# Patient Record
Sex: FEMALE | Race: WHITE | Employment: OTHER | URBAN - METROPOLITAN AREA
[De-identification: names, ages, dates, MRNs, and addresses within clinical notes are randomized per-mention and may not be internally consistent; named-entity substitution may affect disease eponyms.]

---

## 2017-01-29 RX ORDER — CHLORTHALIDONE 50 MG/1
TABLET ORAL
Qty: 90 TAB | Refills: 0 | Status: SHIPPED | OUTPATIENT
Start: 2017-01-29 | End: 2017-05-02 | Stop reason: SDUPTHER

## 2017-01-29 RX ORDER — LEVOTHYROXINE SODIUM 112 UG/1
TABLET ORAL
Qty: 90 TAB | Refills: 0 | Status: SHIPPED | OUTPATIENT
Start: 2017-01-29 | End: 2017-05-02 | Stop reason: SDUPTHER

## 2017-02-02 RX ORDER — FENOFIBRATE 145 MG/1
145 TABLET, COATED ORAL DAILY
Qty: 90 TAB | Refills: 1 | Status: SHIPPED | OUTPATIENT
Start: 2017-02-02 | End: 2017-07-13 | Stop reason: SDUPTHER

## 2017-02-07 ENCOUNTER — TELEPHONE (OUTPATIENT)
Dept: INTERNAL MEDICINE CLINIC | Age: 67
End: 2017-02-07

## 2017-02-07 ENCOUNTER — OFFICE VISIT (OUTPATIENT)
Dept: NEUROLOGY | Age: 67
End: 2017-02-07

## 2017-02-07 VITALS
HEIGHT: 61 IN | SYSTOLIC BLOOD PRESSURE: 132 MMHG | HEART RATE: 88 BPM | RESPIRATION RATE: 18 BRPM | DIASTOLIC BLOOD PRESSURE: 76 MMHG | WEIGHT: 186.2 LBS | OXYGEN SATURATION: 96 % | BODY MASS INDEX: 35.16 KG/M2

## 2017-02-07 DIAGNOSIS — G25.81 RLS (RESTLESS LEGS SYNDROME): Primary | ICD-10-CM

## 2017-02-07 RX ORDER — ROPINIROLE 0.25 MG/1
TABLET, FILM COATED ORAL
Qty: 30 TAB | Refills: 4 | Status: SHIPPED | OUTPATIENT
Start: 2017-02-07 | End: 2017-06-02

## 2017-02-07 NOTE — PROGRESS NOTES
Fuad Brasher is a 78 y.o. female who presents with the following  Chief Complaint   Patient presents with    Numbness     follow up       HPI Patient comes in for a follow up for RLS and neuropathy. She has been taking the Horizant 600 mg daily and feels like this has been working well. She is taking this at about dinner time and this starts kicking in by bedtime and she feels normally well. Sometimes she has trouble with just her feet at night. Feet tingle and burn while she is in bed. But the legs feel good. Throughout the day her symptoms are great. No issues. No trouble with her balance or falling or any side effects to the medications other then she feels like the horizant may be causing some weight gain. She is also on some other medications that may cause this but we are unsure. Allergies   Allergen Reactions    Sudafed [Pseudoephedrine Hcl] Palpitations    Codeine Unknown (comments)    Darvocet A500 [Propoxyphene N-Acetaminophen] Nausea and Vomiting       Current Outpatient Prescriptions   Medication Sig    rOPINIRole (REQUIP) 0.25 mg tablet Take 1 tablet by mouth nightly    fenofibrate nanocrystallized (TRICOR) 145 mg tablet Take 1 Tab by mouth daily.  chlorthalidone (HYGROTEN) 50 mg tablet TAKE ONE TABLET BY MOUTH ONCE DAILY **REPLACES  THE  25  MG  DOSE**    levothyroxine (SYNTHROID) 112 mcg tablet TAKE ONE TABLET BY MOUTH ONCE DAILY BEFORE BREAKFAST    gabapentin enacarbil (HORIZANT) 600 mg TbER Take 1 Tab by mouth daily.  triamcinolone acetonide (KENALOG) 0.1 % topical cream Apply  to affected area two (2) times a day. use thin layer    PREMARIN 0.625 mg tablet TAKE ONE TABLET BY MOUTH ONCE DAILY    triamcinolone (NASACORT AQ) 55 mcg nasal inhaler 2 Sprays as needed.  cholecalciferol (VITAMIN D3) 1,000 unit tablet Take 1,000 Units by mouth daily.  b complex vitamins tablet Take 1 Tab by mouth daily.  vitamin e (E GEMS) 1,000 unit capsule Take 1,000 Units by mouth daily.  Glucosamine &Chondroit-MV-Min3 116-549-88-0.5 mg Tab Take  by mouth.  MULTIVITAMINS (DAILY VITAMINS PO) Take 1 Tab by mouth daily. No current facility-administered medications for this visit. History   Smoking Status    Never Smoker   Smokeless Tobacco    Never Used       Past Medical History   Diagnosis Date    Cancer ([facility] [address]      skin    Hypercholesterolemia     Hypertension     Thyroid disease        Past Surgical History   Procedure Laterality Date    Hx gyn        x 2, hysterectomy    Hx malignant skin lesion excision  2012     skin cancer from nose    Pr breast surgery procedure unlisted  ,3/2007,      breast biopsy    Hx colonoscopy  16     Dr. Elizabeth Dinh       Family History   Problem Relation Age of Onset    Heart Disease Father      CAD, CABG    Diabetes Father      insulin    Hypertension Mother     Cancer Mother     Diabetes Brother     Hypertension Brother        Social History     Social History    Marital status:      Spouse name: N/A    Number of children: N/A    Years of education: N/A     Social History Main Topics    Smoking status: Never Smoker    Smokeless tobacco: Never Used    Alcohol use No    Drug use: No    Sexual activity: Yes     Partners: Male     Other Topics Concern    None     Social History Narrative       Review of Systems   HENT: Negative for hearing loss and tinnitus. Eyes: Negative for blurred vision, double vision and photophobia. Respiratory: Negative for shortness of breath and wheezing. Cardiovascular: Negative for chest pain and palpitations. Gastrointestinal: Negative for nausea and vomiting. Musculoskeletal: Negative for falls. Neurological: Positive for tingling and sensory change. Negative for dizziness, tremors, weakness and headaches. Remainder of comprehensive review is negative.      Physical Exam :    Visit Vitals    /76    Pulse 88    Resp 18    Ht 5' 1\" (1.549 m)    Wt 84.5 kg (186 lb 3.2 oz)    SpO2 96%    BMI 35.18 kg/m2       General: Well defined, nourished, and groomed individual in no acute distress.    Neck: Supple, nontender, no bruits, no pain with resistance to active range of motion.    Heart: Regular rate and rhythm, no murmurs, rub, or gallop. Normal S1S2. Lungs: Clear to auscultation bilaterally with equal chest expansion, no cough, no wheeze  Musculoskeletal: Extremities revealed no edema and had full range of motion of joints.    Psych: Good mood and bright affect    NEUROLOGICAL EXAMINATION:    Mental Status: Alert and oriented to person, place, and time    Cranial Nerves:    II, III, IV, VI: Visual acuity grossly intact. Visual fields are normal.    Pupils are equal, round, and reactive to light and accommodation.    Extra-ocular movements are full and fluid. Fundoscopic exam was benign, no ptosis or nystagmus.    V-XII: Hearing is grossly intact. Facial features are symmetric, with normal sensation and strength. The palate rises symmetrically and the tongue protrudes midline. Sternocleidomastoids 5/5. Motor Examination: Normal tone, bulk, and strength, 5/5 muscle strength throughout. Coordination: Finger to nose was normal. No resting or intention tremor    Gait and Station: Steady while walking. Normal arm swing. No pronator drift. No muscle wasting or fasiculations noted. Reflexes: DTRs 2+ throughout. Results for orders placed or performed in visit on 09/19/16   VITAMIN B12   Result Value Ref Range    Vitamin B12 681 211 - 946 pg/mL       Orders Placed This Encounter    rOPINIRole (REQUIP) 0.25 mg tablet     Sig: Take 1 tablet by mouth nightly     Dispense:  30 Tab     Refill:  4       1. RLS (restless legs syndrome)        Follow-up Disposition:  Return in about 6 months (around 8/7/2017). RLS and small fiber neuropathy. We will continue Horizant 600 mg daily for symptoms management as this is working very well for her.  Can increase if needed but at this time we will hold off due to weight concerns. We will try adding Requip back at night at 0.25 mg nightly to see if thishelps her feet calm down during the night. Should not cause side effcts as she has been on this before and done well. This may be what she needs to calm her feet down at night. She will be traveling a lot these next coming months and we talked about this in regards to her symptoms. No other questions. Call if things get worse. Can try mirapex at night or increase horizant to BID if needed for further relief.          Marc Hodgkins, EROS        This note will not be viewable in [address]

## 2017-02-07 NOTE — PATIENT INSTRUCTIONS
[facility]   Statement to Patients  April 1, 2014      In an effort to ensure the large volume of patient prescription refills is processed in the most efficient and expeditious manner, we are asking our patients to assist us by calling your Pharmacy for all prescription refills, this will include also your  Mail Order Pharmacy. The pharmacy will contact our office electronically to continue the refill process. Please do not wait until the last minute to call your pharmacy. We need at least 48 hours (2days) to fill prescriptions. We also encourage you to call your pharmacy before going to  your prescription to make sure it is ready. With regard to controlled substance prescription refill requests (narcotic refills) that need to be picked up at our office, we ask your cooperation by providing us with at least 72 hours (3days) notice that you will need a refill. We will not refill narcotic prescription refill requests after 4:00pm on any weekday, Monday through Thursday, or after 2:00pm on Fridays, or on the weekends. We encourage everyone to explore another way of getting your prescription refill request processed using [employer], our patient web portal through our electronic medical record system. [employer] is an efficient and effective way to communicate your medication request directly to the office and  downloadable as an thelma on your smart phone . [employer] also features a review functionality that allows you to view your medication list as well as leave messages for your physician. Are you ready to get connected? If so please review the attatched instructions or speak to any of our staff to get you set up right away! Thank you so much for your cooperation. Should you have any questions please contact our Practice Administrator.     The Physicians and Staff,  [facility]  What is a living will?  A living will is a legal form you use to write down the kind of care you want at the end of your life. It is used by the health professionals who will treat you if you aren't able to decide for yourself. If you put your wishes in writing, your loved ones and others will know what kind of care you want. They won't need to guess. This can ease your mind and be helpful to others. A living will is not the same as an estate or property will. An estate will explains what you want to happen with your money and property after you die. Is a living will a legal document? A living will is a legal document. Each state has its own laws about living [place]. If you move to another state, make sure that your living will is legal in the state where you now live. Or you might use a universal form that has been approved by many states. This kind of form can sometimes be completed and stored online. Your electronic copy will then be available wherever you have a connection to the Internet. In most cases, doctors will respect your wishes even if you have a form from a different state. · You don't need an  to complete a living will. But legal advice can be helpful if your state's laws are unclear, your health history is complicated, or your family can't agree on what should be in your living will. · You can change your living will at any time. Some people find that their wishes about end-of-life care change as their health changes. · In addition to making a living will, think about completing a medical power of  form. This form lets you name the person you want to make end-of-life treatment decisions for you (your \"health care agent\") if you're not able to. Many hospitals and nursing homes will give you the forms you need to complete a living will and a medical power of . · Your living will is used only if you can't make or communicate decisions for yourself anymore.  If you become able to make decisions again, you can accept or refuse any treatment, no matter what you wrote in your living will. · Your state may offer an online registry. This is a place where you can store your living will online so the doctors and nurses who need to treat you can find it right away. What should you think about when creating a living will? Talk about your end-of-life wishes with your family members and your doctor. Let them know what you want. That way the people making decisions for you won't be surprised by your choices. Think about these questions as you make your living will:  · Do you know enough about life support methods that might be used? If not, talk to your doctor so you know what might be done if you can't breathe on your own, your heart stops, or you're unable to swallow. · What things would you still want to be able to do after you receive life-support methods? Would you want to be able to walk? To speak? To eat on your own? To live without the help of machines? · If you have a choice, where do you want to be cared for? In your home? At a hospital or nursing home? · Do you want certain Orthodox practices performed if you become very ill? · If you have a choice at the end of your life, where would you prefer to die? At home? In a hospital or nursing home? Somewhere else? · Would you prefer to be buried or cremated? · Do you want your organs to be donated after you die? What should you do with your living will? · Make sure that your family members and your health care agent have copies of your living will. · Give your doctor a copy of your living will to keep in your medical record. If you have more than one doctor, make sure that each one has a copy. · You may want to put a copy of your living will where it can be easily found. Where can you learn more? Go to http://malathi-alana.info/. Enter Y655 in the search box to learn more about \"Learning About Living [place]homar. \"  Current as of: February 24, 2016  Content Version: 11.1  © 9820-2479 [employer], [employer]orporated. Care instructions adapted under license by [employer] (which disclaims liability or warranty for this information). If you have questions about a medical condition or this instruction, always ask your healthcare professional. [address] any warranty or liability for your use of this information.

## 2017-02-07 NOTE — MR AVS SNAPSHOT
Visit Information Date & Time Provider Department Dept. Phone Encounter #  
 2/7/2017  9:30 AM Maribell Garrido NP Neurology [phone] Follow-up Instructions Return in about 6 months (around 8/7/2017). Your Appointments 5/19/2017  4:00 PM  
Medicare Physical with Gail Neal MD  
[facility]) Appt Note: Medicare Wellness [address] [facility] [address]  
[address] [facility] [address] Upcoming Health Maintenance Date Due  
 GLAUCOMA SCREENING Q2Y 1/13/2016 MEDICARE YEARLY EXAM 1/13/2016 Pneumococcal 65+ Low/Medium Risk (2 of 2 - PPSV23) 2/6/2017 BREAST CANCER SCRN MAMMOGRAM 11/1/2017 DTaP/Tdap/Td series (2 - Td) 6/3/2023 COLONOSCOPY 1/29/2026 Allergies as of 2/7/2017  Review Complete On: 2/7/2017 By: Eduardo Lim LPN Severity Noted Reaction Type Reactions Sudafed [Pseudoephedrine Hcl] High 04/06/2011    Palpitations Codeine  02/25/2010    Unknown (comments) Darvocet A500 [Propoxyphene N-acetaminophen]  03/16/2004    Nausea and Vomiting Current Immunizations  Reviewed on 2/19/2016 Name Date Influenza Vaccine 12/16/2016, 10/1/2015, 11/1/2014 Pneumococcal Conjugate (PCV-13) 2/6/2016 Tdap 6/3/2013 Zoster Vaccine, Live 5/5/2016 Not reviewed this visit You Were Diagnosed With   
  
 Codes Comments RLS (restless legs syndrome)    -  Primary ICD-10-CM: G25.81 ICD-9-CM: 333.94 Vitals BP Pulse Resp Height(growth percentile) Weight(growth percentile) SpO2  
 132/76 88 18 5' 1\" (1.549 m) 186 lb 3.2 oz (84.5 kg) 96% BMI OB Status Smoking Status 35.18 kg/m2 Hysterectomy Never Smoker Vitals History BMI and BSA Data Body Mass Index Body Surface Area  
 35.18 kg/m 2 1.91 m 2 Preferred Pharmacy Pharmacy Name Phone [facility] [facility], [address] [place] [address],[address] [phone] Your Updated Medication List  
  
   
This list is accurate as of: 2/7/17 10:13 AM.  Always use your most recent med list.  
  
  
  
  
 b complex vitamins tablet Take 1 Tab by mouth daily. chlorthalidone 50 mg tablet Commonly known as:  HYGROTEN  
TAKE ONE TABLET BY MOUTH ONCE DAILY **REPLACES  THE  25  MG  DOSE**  
  
 DAILY VITAMINS PO Take 1 Tab by mouth daily. fenofibrate nanocrystallized 145 mg tablet Commonly known as:  [employer] Take 1 Tab by mouth daily. gabapentin enacarbil 600 mg Tber Commonly known as:  Durenda Inga Take 1 Tab by mouth daily. Glucosamine &Chondroit-MV-Min3 054-199-67-0.5 mg Tab Take  by mouth.  
  
 levothyroxine 112 mcg tablet Commonly known as:  SYNTHROID  
TAKE ONE TABLET BY MOUTH ONCE DAILY BEFORE BREAKFAST  
  
 * NASACORT AQ 55 mcg nasal inhaler Generic drug:  triamcinolone 2 Sprays as needed. * triamcinolone acetonide 0.1 % topical cream  
Commonly known as:  KENALOG Apply  to affected area two (2) times a day. use thin layer PREMARIN 0.625 mg tablet Generic drug:  conjugated estrogens TAKE ONE TABLET BY MOUTH ONCE DAILY  
  
 rOPINIRole 0.25 mg tablet Commonly known as:  Madie Johnter Take 1 tablet by mouth nightly VITAMIN D3 1,000 unit tablet Generic drug:  cholecalciferol Take 1,000 Units by mouth daily. vitamin e 1,000 unit capsule Commonly known as:  E GEMS Take 1,000 Units by mouth daily. * Notice: This list has 2 medication(s) that are the same as other medications prescribed for you. Read the directions carefully, and ask your doctor or other care provider to review them with you. Prescriptions Printed Refills  
 rOPINIRole (REQUIP) 0.25 mg tablet 4 Sig: Take 1 tablet by mouth nightly Class: Print Follow-up Instructions Return in about 6 months (around 8/7/2017). Patient Instructions PRESCRIPTION REFILL POLICY [facility] Statement to Patients April 1, 2014 In an effort to ensure the large volume of patient prescription refills is processed in the most efficient and expeditious manner, we are asking our patients to assist us by calling your Pharmacy for all prescription refills, this will include also your  Mail Order Pharmacy. The pharmacy will contact our office electronically to continue the refill process. Please do not wait until the last minute to call your pharmacy. We need at least 48 hours (2days) to fill prescriptions. We also encourage you to call your pharmacy before going to  your prescription to make sure it is ready. With regard to controlled substance prescription refill requests (narcotic refills) that need to be picked up at our office, we ask your cooperation by providing us with at least 72 hours (3days) notice that you will need a refill. We will not refill narcotic prescription refill requests after 4:00pm on any weekday, Monday through Thursday, or after 2:00pm on Fridays, or on the weekends. We encourage everyone to explore another way of getting your prescription refill request processed using [employer], our patient web portal through our electronic medical record system. [employer] is an efficient and effective way to communicate your medication request directly to the office and  downloadable as an thelma on your smart phone . [employer] also features a review functionality that allows you to view your medication list as well as leave messages for your physician. Are you ready to get connected? If so please review the attatched instructions or speak to any of our staff to get you set up right away! Thank you so much for your cooperation. Should you have any questions please contact our Practice Administrator. The Physicians and Staff,  [facility] [address] What is a living will? A living will is a legal form you use to write down the kind of care you want at the end of your life. It is used by the health professionals who will treat you if you aren't able to decide for yourself. If you put your wishes in writing, your loved ones and others will know what kind of care you want. They won't need to guess. This can ease your mind and be helpful to others. A living will is not the same as an estate or property will. An estate will explains what you want to happen with your money and property after you die. Is a living will a legal document? A living will is a legal document. Each state has its own laws about living [place]. If you move to another state, make sure that your living will is legal in the state where you now live. Or you might use a universal form that has been approved by many states. This kind of form can sometimes be completed and stored online. Your electronic copy will then be available wherever you have a connection to the Internet. In most cases, doctors will respect your wishes even if you have a form from a different state. · You don't need an  to complete a living will. But legal advice can be helpful if your state's laws are unclear, your health history is complicated, or your family can't agree on what should be in your living will. · You can change your living will at any time. Some people find that their wishes about end-of-life care change as their health changes. · In addition to making a living will, think about completing a medical power of  form. This form lets you name the person you want to make end-of-life treatment decisions for you (your \"health care agent\") if you're not able to. Many hospitals and nursing homes will give you the forms you need to complete a living will and a medical power of .  
· Your living will is used only if you can't make or communicate decisions for yourself anymore. If you become able to make decisions again, you can accept or refuse any treatment, no matter what you wrote in your living will. · Your state may offer an online registry. This is a place where you can store your living will online so the doctors and nurses who need to treat you can find it right away. What should you think about when creating a living will? Talk about your end-of-life wishes with your family members and your doctor. Let them know what you want. That way the people making decisions for you won't be surprised by your choices. Think about these questions as you make your living will: · Do you know enough about life support methods that might be used? If not, talk to your doctor so you know what might be done if you can't breathe on your own, your heart stops, or you're unable to swallow. · What things would you still want to be able to do after you receive life-support methods? Would you want to be able to walk? To speak? To eat on your own? To live without the help of machines? · If you have a choice, where do you want to be cared for? In your home? At a hospital or nursing home? · Do you want certain Bahai practices performed if you become very ill? · If you have a choice at the end of your life, where would you prefer to die? At home? In a hospital or nursing home? Somewhere else? · Would you prefer to be buried or cremated? · Do you want your organs to be donated after you die? What should you do with your living will? · Make sure that your family members and your health care agent have copies of your living will. · Give your doctor a copy of your living will to keep in your medical record. If you have more than one doctor, make sure that each one has a copy. · You may want to put a copy of your living will where it can be easily found. Where can you learn more? Go to http://malathi-alana.info/. Enter Y477 in the search box to learn more about \"Learning About Living Lit. \" Current as of: February 24, 2016 Content Version: 11.1 © 9613-5421 [employer] [place] [employer], Incorporated. Care instructions adapted under license by [employer] (which disclaims liability or warranty for this information). If you have questions about a medical condition or this instruction, always ask your healthcare professional. [address] any warranty or liability for your use of this information. Introducing [facility] [place] [facility]! Dear Regina George: Thank you for requesting a [employer] account. Our records indicate that you already have an active [employer] account. You can access your account anytime at [email or web address] Did you know that you can access your hospital and ER discharge instructions at any time in [employer]? You can also review all of your test results from your hospital stay or ER visit. Additional Information If you have questions, please visit the Frequently Asked Questions section of the [employer] website at [email or web address] Remember, [employer] is NOT to be used for urgent needs. For medical emergencies, dial 911. Now available from your iPhone and Android! Please provide this summary of care documentation to your next provider. Your primary care clinician is listed as Natali [email or web address] If you have any questions after today's visit, please call [phone].

## 2017-02-07 NOTE — PROGRESS NOTES
Patient present for a follow up. Reports Horizant works well on her legs but still experiencing burning, tingling in her feet that is aggravating at night. Feels she may be gaining weight on the Horizant.

## 2017-02-08 NOTE — TELEPHONE ENCOUNTER
Returned call to pt and advised yes does need to have pneumonia 23 vaccine. Pt verbalized understanding and states will get from [address] this week.

## 2017-04-16 RX ORDER — BUDESONIDE AND FORMOTEROL FUMARATE DIHYDRATE 160; 4.5 UG/1; UG/1
2 AEROSOL RESPIRATORY (INHALATION) 2 TIMES DAILY
Qty: 1 INHALER | Refills: 0 | Status: SHIPPED | COMMUNITY
Start: 2017-04-16 | End: 2017-06-02

## 2017-04-19 ENCOUNTER — OFFICE VISIT (OUTPATIENT)
Dept: INTERNAL MEDICINE CLINIC | Age: 67
End: 2017-04-19

## 2017-04-19 VITALS
DIASTOLIC BLOOD PRESSURE: 86 MMHG | OXYGEN SATURATION: 98 % | RESPIRATION RATE: 16 BRPM | HEIGHT: 61 IN | HEART RATE: 81 BPM | TEMPERATURE: 98.1 F | BODY MASS INDEX: 34.93 KG/M2 | SYSTOLIC BLOOD PRESSURE: 164 MMHG | WEIGHT: 185 LBS

## 2017-04-19 DIAGNOSIS — J40 BRONCHITIS: Primary | ICD-10-CM

## 2017-04-19 RX ORDER — AZITHROMYCIN 250 MG/1
250 TABLET, FILM COATED ORAL SEE ADMIN INSTRUCTIONS
Qty: 6 TAB | Refills: 0 | Status: SHIPPED | OUTPATIENT
Start: 2017-04-19 | End: 2017-04-24

## 2017-04-19 RX ORDER — BENZONATATE 200 MG/1
200 CAPSULE ORAL
Qty: 20 CAP | Refills: 0 | Status: SHIPPED | OUTPATIENT
Start: 2017-04-19 | End: 2017-04-26

## 2017-04-19 NOTE — ACP (ADVANCE CARE PLANNING)
Advance Care Planning    Patient's Healthcare Decision Maker is: Named in scanned ACP document   Confirm Advance Directive Yes, on file      Reviewed the current advance care plan document on file with patient and all information is up to date.

## 2017-04-19 NOTE — PROGRESS NOTES
Subjective:   Abi Sheikh is a 78 y.o. female who complains of congestion, productive cough, fevers up to 101.8 degrees and yellow nasal discharge for 5 days, had a cough for weeks without fever prior to this time. She denies a history of myalgias, nausea, shortness of breath, sweats, vomiting and wheezing. Evaluation to date: none. Treatment to date: OTC products. Patient does not smoke cigarettes. Relevant PMH: No pertinent additional PMH. Patient Active Problem List    Diagnosis Date Noted    Neuropathic pain of both feet ([address] 11/09/2016    Mixed hyperlipidemia 08/19/2016    Advance directive discussed with patient 02/19/2016    Essential hypertension 03/01/2010    Acquired hypothyroidism 03/01/2010    Back pain 02/25/2010     Current Outpatient Prescriptions   Medication Sig Dispense Refill    CLEMASTINE FUMARATE (ALLERGY RELIEF, CLEMASTINE, PO) Take  by mouth.  benzonatate (TESSALON) 200 mg capsule Take 1 Cap by mouth three (3) times daily as needed for Cough for up to 7 days. 20 Cap 0    azithromycin (ZITHROMAX) 250 mg tablet Take 1 Tab by mouth See Admin Instructions for 5 days. 6 Tab 0    budesonide-formoterol (SYMBICORT) 160-4.5 mcg/actuation HFA inhaler Take 2 Puffs by inhalation two (2) times a day. 1 Inhaler 0    rOPINIRole (REQUIP) 0.25 mg tablet Take 1 tablet by mouth nightly 30 Tab 4    fenofibrate nanocrystallized (TRICOR) 145 mg tablet Take 1 Tab by mouth daily. 90 Tab 1    chlorthalidone (HYGROTEN) 50 mg tablet TAKE ONE TABLET BY MOUTH ONCE DAILY **REPLACES  THE  25  MG  DOSE** 90 Tab 0    levothyroxine (SYNTHROID) 112 mcg tablet TAKE ONE TABLET BY MOUTH ONCE DAILY BEFORE BREAKFAST 90 Tab 0    gabapentin enacarbil (HORIZANT) 600 mg TbER Take 1 Tab by mouth daily. 30 Tab 5    triamcinolone acetonide (KENALOG) 0.1 % topical cream Apply  to affected area two (2) times a day.  use thin layer 45 g 0    PREMARIN 0.625 mg tablet TAKE ONE TABLET BY MOUTH ONCE DAILY 30 Tab 12    triamcinolone (NASACORT AQ) 55 mcg nasal inhaler 2 Sprays as needed.  cholecalciferol (VITAMIN D3) 1,000 unit tablet Take 1,000 Units by mouth daily.  b complex vitamins tablet Take 1 Tab by mouth daily.  vitamin e (E GEMS) 1,000 unit capsule Take 1,000 Units by mouth daily.  Glucosamine &Chondroit-MV-Min3 015-464-31-0.5 mg Tab Take  by mouth.  MULTIVITAMINS (DAILY VITAMINS PO) Take 1 Tab by mouth daily. Allergies   Allergen Reactions    Sudafed [Pseudoephedrine Hcl] Palpitations    Codeine Unknown (comments)    Darvocet A500 [Propoxyphene N-Acetaminophen] Nausea and Vomiting     Past Medical History:   Diagnosis Date    Cancer ([facility] [address]     skin    Hypercholesterolemia     Hypertension     Thyroid disease      Past Surgical History:   Procedure Laterality Date    BREAST SURGERY PROCEDURE UNLISTED  ,3/2007,     breast biopsy    HX COLONOSCOPY  16    Dr. Manav Tilley GYN       x 2, hysterectomy    HX MALIGNANT SKIN LESION EXCISION      skin cancer from nose     Family History   Problem Relation Age of Onset    Heart Disease Father      CAD, CABG    Diabetes Father      insulin    Hypertension Mother     Cancer Mother     Diabetes Brother     Hypertension Brother      Social History   Substance Use Topics    Smoking status: Never Smoker    Smokeless tobacco: Never Used    Alcohol use No        Review of Systems  Pertinent items are noted in HPI. Objective:     Visit Vitals    /86    Pulse 81    Temp 98.1 °F (36.7 °C) (Oral)    Resp 16    Ht 5' 1\" (1.549 m)    Wt 185 lb (83.9 kg)    SpO2 98%    BMI 34.96 kg/m2     General:  alert, cooperative, no distress   Eyes: negative   Ears: normal TM's and external ear canals AU   Sinuses: Normal paranasal sinuses without tenderness   Mouth:  Lips, mucosa, and tongue normal. Teeth and gums normal   Neck: supple, symmetrical, trachea midline and no adenopathy.    Heart: S1 and S2 normal, no murmurs noted. Lungs: clear to auscultation bilaterally   Abdomen: soft, non-tender. Bowel sounds normal. No masses,  no organomegaly        Assessment/Plan:   bronchitis and bronchospasm  Plan -   Orders Placed This Encounter    CLEMASTINE FUMARATE (ALLERGY RELIEF, CLEMASTINE, PO)     Sig: Take  by mouth.  benzonatate (TESSALON) 200 mg capsule     Sig: Take 1 Cap by mouth three (3) times daily as needed for Cough for up to 7 days. Dispense:  20 Cap     Refill:  0    azithromycin (ZITHROMAX) 250 mg tablet     Sig: Take 1 Tab by mouth See Admin Instructions for 5 days. Dispense:  6 Tab     Refill:  0     Call next week for steroids if not better. Advised her to call back or return to office if symptoms worsen/change/persist.  Discussed expected course/resolution/complications of diagnosis in detail with patient. Medication risks/benefits/costs/interactions/alternatives discussed with patient. She was given an after visit summary which includes diagnoses, current medications, & vitals. She expressed understanding with the diagnosis and plan. Gabby Perkins

## 2017-04-19 NOTE — PROGRESS NOTES
Chief Complaint   Patient presents with    Fever     99.5 this AM     Chest Congestion     x 4 - 5 weeks     Cough     yellow sputum      Health Maintenance Due   Topic Date Due    GLAUCOMA SCREENING Q2Y  01/13/2016    MEDICARE YEARLY EXAM  01/13/2016     Faxed request to [facility]'s office requesting patients most recent glaucoma screening to be faxed to our office. Fax confirmation received.       Future Appointments - [facility]   Date Time Provider Sandra Florez   5/19/2017 4:00 PM Bobby Crawford MD [address] [facility]

## 2017-04-19 NOTE — MR AVS SNAPSHOT
Visit Information Date & Time Provider Department Dept. Phone Encounter #  
 4/19/2017 11:15 AM Marques Pascual MD [facility] Internal Medicine [phone] [id number] Your Appointments 5/19/2017  4:00 PM  
Medicare Physical with Marques Pascual MD  
[facility] Internal Medicine [address]) Appt Note: Medicare Wellness [address] [facility] [address] [facility] [address]  
[address] [address]  
  
    
 8/7/2017  8:30 AM  
Any with Yuni Ruiz NP Neurology [address]) Appt Note: f/u 6 months. .. $0Cp sesb 02/08/17 [address] [place] [address] [phone]  
  
   
 [address] [phone] [address] Upcoming Health Maintenance Date Due  
 GLAUCOMA SCREENING Q2Y 1/13/2016 MEDICARE YEARLY EXAM 1/13/2016 BREAST CANCER SCRN MAMMOGRAM 11/1/2018 DTaP/Tdap/Td series (2 - Td) 6/3/2023 COLONOSCOPY 1/29/2026 Allergies as of 4/19/2017  Review Complete On: 4/19/2017 By: Marques Pascual MD  
  
 Severity Noted Reaction Type Reactions Sudafed [Pseudoephedrine Hcl] High 04/06/2011    Palpitations Codeine  02/25/2010    Unknown (comments) Darvocet A500 [Propoxyphene N-acetaminophen]  03/16/2004    Nausea and Vomiting Current Immunizations  Reviewed on 2/19/2016 Name Date Influenza Vaccine 12/16/2016, 10/1/2015, 11/1/2014 Pneumococcal Conjugate (PCV-13) 2/6/2016 Pneumococcal Polysaccharide (PPSV-23) 2/11/2017 Tdap 6/3/2013 Zoster Vaccine, Live 5/5/2016 Not reviewed this visit You Were Diagnosed With   
  
 Codes Comments Bronchitis    -  Primary ICD-10-CM: Q44 ICD-9-CM: 131 Vitals BP Pulse Temp Resp Height(growth percentile) Weight(growth percentile) 164/86 81 98.1 °F (36.7 °C) (Oral) 16 5' 1\" (1.549 m) 185 lb (83.9 kg) SpO2 BMI OB Status Smoking Status 98% 34.96 kg/m2 Hysterectomy Never Smoker Vitals History BMI and BSA Data Body Mass Index Body Surface Area 34.96 kg/m 2 1.9 m 2 Preferred Pharmacy Pharmacy Name Phone [address] PH[facility]Y [facility], [address] [place] [address],[address] [phone] Your Updated Medication List  
  
   
This list is accurate as of: 4/19/17 11:52 AM.  Always use your most recent med list.  
  
  
  
  
 ALLERGY RELIEF (CLEMASTINE) PO Take  by mouth. azithromycin 250 mg tablet Commonly known as:  Dewey Loffler Take 1 Tab by mouth See Admin Instructions for 5 days. b complex vitamins tablet Take 1 Tab by mouth daily. benzonatate 200 mg capsule Commonly known as:  TESSALON Take 1 Cap by mouth three (3) times daily as needed for Cough for up to 7 days. budesonide-formoterol 160-4.5 mcg/actuation HFA inhaler Commonly known as:  SYMBICORT Take 2 Puffs by inhalation two (2) times a day. chlorthalidone 50 mg tablet Commonly known as:  HYGROTEN  
TAKE ONE TABLET BY MOUTH ONCE DAILY **REPLACES  THE  25  MG  DOSE**  
  
 DAILY VITAMINS PO Take 1 Tab by mouth daily. fenofibrate nanocrystallized 145 mg tablet Commonly known as:  [employer] Take 1 Tab by mouth daily. gabapentin enacarbil 600 mg Tber Commonly known as:  Nolon Keel Take 1 Tab by mouth daily. Glucosamine &Chondroit-MV-Min3 887-034-45-0.5 mg Tab Take  by mouth.  
  
 levothyroxine 112 mcg tablet Commonly known as:  SYNTHROID  
TAKE ONE TABLET BY MOUTH ONCE DAILY BEFORE BREAKFAST  
  
 * NASACORT AQ 55 mcg nasal inhaler Generic drug:  triamcinolone 2 Sprays as needed. * triamcinolone acetonide 0.1 % topical cream  
Commonly known as:  KENALOG Apply  to affected area two (2) times a day. use thin layer PREMARIN 0.625 mg tablet Generic drug:  conjugated estrogens TAKE ONE TABLET BY MOUTH ONCE DAILY rOPINIRole 0.25 mg tablet Commonly known as:  Areli La Take 1 tablet by mouth nightly VITAMIN D3 1,000 unit tablet Generic drug:  cholecalciferol Take 1,000 Units by mouth daily. vitamin e 1,000 unit capsule Commonly known as:  E GEMS Take 1,000 Units by mouth daily. * Notice: This list has 2 medication(s) that are the same as other medications prescribed for you. Read the directions carefully, and ask your doctor or other care provider to review them with you. Prescriptions Sent to Pharmacy Refills  
 benzonatate (TESSALON) 200 mg capsule 0 Sig: Take 1 Cap by mouth three (3) times daily as needed for Cough for up to 7 days. Class: Normal  
 Pharmacy: [address] [place] [address], [address] [place] [address],[address] [place] [address] Ph #: [phone] Route: Oral  
 azithromycin (ZITHROMAX) 250 mg tablet 0 Sig: Take 1 Tab by mouth See Admin Instructions for 5 days. Class: Normal  
 Pharmacy: [address] [place] [address], [address] [place] [address],[address] [place] [address] Ph #: [phone] Route: Oral  
  
Introducing [facility] & HEALTH SERVICES! Dear [place] Daily: Thank you for requesting a Wiseryou account. Our records indicate that you already have an active Wiseryou account. You can access your account anytime at [email or web address] Did you know that you can access your hospital and ER discharge instructions at any time in Wiseryou? You can also review all of your test results from your hospital stay or ER visit. Additional Information If you have questions, please visit the Frequently Asked Questions section of the Wiseryou website at [email or web address] Remember, Wiseryou is NOT to be used for urgent needs. For medical emergencies, dial 911. Now available from your iPhone and Android! Please provide this summary of care documentation to your next provider. Your primary care clinician is listed as [place] [phone] If you have any questions after today's visit, please call [phone].

## 2017-05-02 RX ORDER — CHLORTHALIDONE 50 MG/1
TABLET ORAL
Qty: 90 TAB | Refills: 0 | Status: SHIPPED | OUTPATIENT
Start: 2017-05-02 | End: 2017-07-13 | Stop reason: SDUPTHER

## 2017-05-02 RX ORDER — LEVOTHYROXINE SODIUM 112 UG/1
TABLET ORAL
Qty: 90 TAB | Refills: 0 | Status: SHIPPED | OUTPATIENT
Start: 2017-05-02 | End: 2017-07-13 | Stop reason: SDUPTHER

## 2017-05-17 RX ORDER — ESTROGENS, CONJUGATED 0.62 MG/1
TABLET, FILM COATED ORAL
Qty: 30 TAB | Refills: 0 | Status: SHIPPED | OUTPATIENT
Start: 2017-05-17 | End: 2017-05-18 | Stop reason: SDUPTHER

## 2017-05-17 NOTE — TELEPHONE ENCOUNTER
Last Appointment with Dr. Mora Verdugo:  4/19/2017  Future Appointments  Date Time Provider Sandra Florez   5/19/2017 4:00 PM Grace Motta MD [address] [facility]     Orders Placed This Encounter    PREMARIN 0.625 mg tablet     Sig: TAKE ONE TABLET BY MOUTH ONCE DAILY     Dispense:  30 Tab     Refill:  0     The above medication refills were approved via verbal order by Dr. Mora Verdugo III.

## 2017-05-18 NOTE — TELEPHONE ENCOUNTER
Orders Placed This Encounter    conjugated estrogens (PREMARIN) 0.625 mg tablet     Sig: Take 1 Tab by mouth daily. Dispense:  30 Tab     Refill:  5     The above medication refills were approved via verbal order by Dr. Sara Dong III.

## 2017-05-19 ENCOUNTER — OFFICE VISIT (OUTPATIENT)
Dept: INTERNAL MEDICINE CLINIC | Age: 67
End: 2017-05-19

## 2017-05-19 VITALS
RESPIRATION RATE: 18 BRPM | BODY MASS INDEX: 35.5 KG/M2 | TEMPERATURE: 98.7 F | OXYGEN SATURATION: 97 % | WEIGHT: 188 LBS | SYSTOLIC BLOOD PRESSURE: 138 MMHG | HEIGHT: 61 IN | DIASTOLIC BLOOD PRESSURE: 84 MMHG | HEART RATE: 86 BPM

## 2017-05-19 DIAGNOSIS — Z13.39 SCREENING FOR ALCOHOLISM: ICD-10-CM

## 2017-05-19 DIAGNOSIS — E78.2 MIXED HYPERLIPIDEMIA: ICD-10-CM

## 2017-05-19 DIAGNOSIS — G57.93 NEUROPATHIC PAIN OF BOTH FEET: ICD-10-CM

## 2017-05-19 DIAGNOSIS — Z00.00 ROUTINE GENERAL MEDICAL EXAMINATION AT A HEALTH CARE FACILITY: Primary | ICD-10-CM

## 2017-05-19 DIAGNOSIS — E03.9 ACQUIRED HYPOTHYROIDISM: ICD-10-CM

## 2017-05-19 DIAGNOSIS — I10 ESSENTIAL HYPERTENSION: ICD-10-CM

## 2017-05-19 RX ORDER — METFORMIN HYDROCHLORIDE 500 MG/1
500 TABLET, EXTENDED RELEASE ORAL
Qty: 30 TAB | Refills: 5 | Status: SHIPPED | OUTPATIENT
Start: 2017-05-19 | End: 2017-06-08 | Stop reason: DRUGHIGH

## 2017-05-19 NOTE — MR AVS SNAPSHOT
Visit Information Date & Time Provider Department Dept. Phone Encounter #  
 5/19/2017  4:00 PM Zonia Tineo MD [facility] Internal Medicine [phone] [id number] Your Appointments 8/7/2017  8:30 AM  
Any with Eb Montemayor NP Neurology [address] [place] [address]) Appt Note: f/u 6 months. .. $0Cp sesb 02/08/17 [address] [place] [id number] [phone]  
  
   
 [address] [phone] [address] Upcoming Health Maintenance Date Due  
 MEDICARE YEARLY EXAM 1/13/2016 INFLUENZA AGE 9 TO ADULT 8/1/2017 GLAUCOMA SCREENING Q2Y 8/22/2018 BREAST CANCER SCRN MAMMOGRAM 11/1/2018 COLONOSCOPY 1/29/2021 DTaP/Tdap/Td series (2 - Td) 6/3/2023 Allergies as of 5/19/2017  Review Complete On: 5/19/2017 By: Zonia Tineo MD  
  
 Severity Noted Reaction Type Reactions Sudafed [Pseudoephedrine Hcl] High 04/06/2011    Palpitations Codeine  02/25/2010    Unknown (comments) Darvocet A500 [Propoxyphene N-acetaminophen]  03/16/2004    Nausea and Vomiting Current Immunizations  Reviewed on 2/19/2016 Name Date Influenza Vaccine 12/16/2016, 10/1/2015, 11/1/2014 Pneumococcal Conjugate (PCV-13) 2/6/2016 Pneumococcal Polysaccharide (PPSV-23) 2/11/2017 Tdap 6/3/2013 Zoster Vaccine, Live 5/5/2016 Not reviewed this visit You Were Diagnosed With   
  
 Codes Comments Mixed hyperlipidemia    -  Primary ICD-10-CM: D41.1 ICD-9-CM: 272.2 Routine general medical examination at a health care facility     ICD-10-CM: Z00.00 ICD-9-CM: V70.0 Screening for alcoholism     ICD-10-CM: Z13.89 ICD-9-CM: V79.1 Acquired hypothyroidism     ICD-10-CM: E03.9 ICD-9-CM: 244.9 Essential hypertension     ICD-10-CM: I10 
ICD-9-CM: 401.9 Neuropathic pain of both feet (HCC)     ICD-10-CM: G62.9 ICD-9-CM: 356.9 Vitals BP Pulse Temp Resp Height(growth percentile) Weight(growth percentile) 138/84 86 98.7 °F (37.1 °C) (Oral) 18 5' 1\" (1.549 m) 188 lb (85.3 kg) SpO2 BMI OB Status Smoking Status 97% 35.52 kg/m2 Hysterectomy Never Smoker Vitals History BMI and BSA Data Body Mass Index Body Surface Area 35.52 kg/m 2 1.92 m 2 Preferred Pharmacy Pharmacy Name Phone [facility] [address] [facility], [address] [place] [address],[address] [phone] Your Updated Medication List  
  
   
This list is accurate as of: 5/19/17  4:46 PM.  Always use your most recent med list.  
  
  
  
  
 ALLERGY RELIEF (CLEMASTINE) PO Take  by mouth.  
  
 b complex vitamins tablet Take 1 Tab by mouth daily. budesonide-formoterol 160-4.5 mcg/actuation HFA inhaler Commonly known as:  SYMBICORT Take 2 Puffs by inhalation two (2) times a day. chlorthalidone 50 mg tablet Commonly known as:  HYGROTEN  
TAKE ONE TABLET BY MOUTH ONCE DAILY (REPLACES  25  MG  DOSE)  
  
 conjugated estrogens 0.625 mg tablet Commonly known as:  PREMARIN Take 1 Tab by mouth daily. DAILY VITAMINS PO Take 1 Tab by mouth daily. fenofibrate nanocrystallized 145 mg tablet Commonly known as:  [employer] Take 1 Tab by mouth daily. gabapentin enacarbil 600 mg Tber Commonly known as:  Karle Hang Take 1 Tab by mouth daily. Glucosamine &Chondroit-MV-Min3 003-951-95-0.5 mg Tab Take  by mouth.  
  
 levothyroxine 112 mcg tablet Commonly known as:  SYNTHROID  
TAKE ONE TABLET BY MOUTH ONCE DAILY BEFORE BREAKFAST  
  
 * NASACORT AQ 55 mcg nasal inhaler Generic drug:  triamcinolone 2 Sprays as needed. * triamcinolone acetonide 0.1 % topical cream  
Commonly known as:  KENALOG Apply  to affected area two (2) times a day. use thin layer  
  
 rOPINIRole 0.25 mg tablet Commonly known as:  Areli La Take 1 tablet by mouth nightly VITAMIN D3 1,000 unit tablet Generic drug:  cholecalciferol Take 1,000 Units by mouth daily. vitamin e 1,000 unit capsule Commonly known as:  E GEMS Take 1,000 Units by mouth daily. * Notice: This list has 2 medication(s) that are the same as other medications prescribed for you. Read the directions carefully, and ask your doctor or other care provider to review them with you. We Performed the Following LIPID PANEL [39792 CPT(R)] METABOLIC PANEL, COMPREHENSIVE [06767 CPT(R)] T4, FREE [id number] CPT(R)] TSH 3RD GENERATION [35531 CPT(R)] Patient Instructions Medicare Wellness Visit, Female The best way to live healthy is to have a healthy lifestyle by eating a well-balanced diet, exercising regularly, limiting alcohol and stopping smoking. Regular physical exams and screening tests are another way to keep healthy. Preventive exams provided by your health care provider can find health problems before they become diseases or illnesses. Preventive services including immunizations, screening tests, monitoring and exams can help you take care of your own health. All people over age 72 should have a pneumovax  and and a prevnar shot to prevent pneumonia. These are once in a lifetime unless you and your provider decide differently. All people over 65 should have a yearly flu shot and a tetanus vaccine every 10 years. A bone mass density to screen for osteoporosis or thinning of the bones should be done every 2 years after 65. Screening for diabetes mellitus with a blood sugar test should be done every year. Glaucoma is a disease of the eye due to increased ocular pressure that can lead to blindness and it should be done every year by an eye professional. 
 
Cardiovascular screening tests that check for elevated lipids (fatty part of blood) which can lead to heart disease and strokes should be done every 5 years. Colorectal screening that evaluates for blood or polyps in your colon should be done yearly as a stool test or every five years as a flexible sigmoidoscope or every 10 years as a colonoscopy up to age 76. Breast cancer screening with a mammogram is recommended biennially  for women age 54-69. Screening for cervical cancer with a pap smear and pelvic exam is recommended for women after age 72 years every 2 years up to age 79 or when the provider and patient decide to stop. If there is a history of cervical abnormalities or other increased risk for cancer then the test is recommended yearly. Hepatitis C screening is also recommended for anyone born between [birth year] through [address]. A shingles vaccine is also recommended once in a lifetime after age 61. Your Medicare Wellness Exam is recommended annually. Here is a list of your current Health Maintenance items with a due date: 
Health Maintenance Due Topic Date Due  
 Annual Well Visit  01/13/2016 Introducing [facility] & HEALTH SERVICES! Vivian Madera Fitting: Thank you for requesting a DealPing account. Our records indicate that you already have an active DealPing account. You can access your account anytime at [email or web address] Did you know that you can access your hospital and ER discharge instructions at any time in DealPing? You can also review all of your test results from your hospital stay or ER visit. Additional Information If you have questions, please visit the Frequently Asked Questions section of the DealPing website at [email or web address] Remember, DealPing is NOT to be used for urgent needs. For medical emergencies, dial 911. Now available from your iPhone and Android! Please provide this summary of care documentation to your next provider. Your primary care clinician is listed as Natali [phone] If you have any questions after today's visit, please call [phone].

## 2017-05-19 NOTE — PATIENT INSTRUCTIONS

## 2017-05-19 NOTE — PROGRESS NOTES
This is an Initial Medicare Annual Wellness Exam (AWV) (Performed 12 months after IPPE or effective date of Medicare Part B enrollment, Once in a lifetime)  Also for followup of HTN as well as lipids and recent cough. Cough is much better. No sputum. No fever or chills. No PND or orthopnea. No change in bowels or bladder. Also has ongoing issues with pain in the feet and seeing neurology MD as well. Also ongoing issues with weight gain and prior elevated sugar and triglycerides as well. I have reviewed the patient's medical history in detail and updated the computerized patient record. History     Past Medical History:   Diagnosis Date    Cancer ([facility] [address]     skin    Hypercholesterolemia     Hypertension     Thyroid disease       Past Surgical History:   Procedure Laterality Date    BREAST SURGERY PROCEDURE UNLISTED  ,3/2007,     breast biopsy    HX COLONOSCOPY  16    Dr. Mayuri Valentin GYN       x 2, hysterectomy    HX MALIGNANT SKIN LESION EXCISION  2012    skin cancer from nose     Current Outpatient Prescriptions   Medication Sig Dispense Refill    conjugated estrogens (PREMARIN) 0.625 mg tablet Take 1 Tab by mouth daily. 30 Tab 5    chlorthalidone (HYGROTEN) 50 mg tablet TAKE ONE TABLET BY MOUTH ONCE DAILY (REPLACES  25  MG  DOSE) 90 Tab 0    levothyroxine (SYNTHROID) 112 mcg tablet TAKE ONE TABLET BY MOUTH ONCE DAILY BEFORE BREAKFAST 90 Tab 0    CLEMASTINE FUMARATE (ALLERGY RELIEF, CLEMASTINE, PO) Take  by mouth.  rOPINIRole (REQUIP) 0.25 mg tablet Take 1 tablet by mouth nightly 30 Tab 4    fenofibrate nanocrystallized (TRICOR) 145 mg tablet Take 1 Tab by mouth daily. 90 Tab 1    gabapentin enacarbil (HORIZANT) 600 mg TbER Take 1 Tab by mouth daily. 30 Tab 5    triamcinolone (NASACORT AQ) 55 mcg nasal inhaler 2 Sprays as needed.  cholecalciferol (VITAMIN D3) 1,000 unit tablet Take 1,000 Units by mouth daily.       b complex vitamins tablet Take 1 Tab by mouth daily.      vitamin e (E GEMS) 1,000 unit capsule Take 1,000 Units by mouth daily.  Glucosamine &Chondroit-MV-Min3 523-900-42-0.5 mg Tab Take  by mouth.  MULTIVITAMINS (DAILY VITAMINS PO) Take 1 Tab by mouth daily.  budesonide-formoterol (SYMBICORT) 160-4.5 mcg/actuation HFA inhaler Take 2 Puffs by inhalation two (2) times a day. 1 Inhaler 0    triamcinolone acetonide (KENALOG) 0.1 % topical cream Apply  to affected area two (2) times a day. use thin layer 45 g 0     Allergies   Allergen Reactions    Sudafed [Pseudoephedrine Hcl] Palpitations    Codeine Unknown (comments)    Darvocet A500 [Propoxyphene N-Acetaminophen] Nausea and Vomiting     Family History   Problem Relation Age of Onset    Heart Disease Father      CAD, CABG    Diabetes Father      insulin    Hypertension Mother     Cancer Mother     Diabetes Brother     Hypertension Brother      Social History   Substance Use Topics    Smoking status: Never Smoker    Smokeless tobacco: Never Used    Alcohol use No     Patient Active Problem List   Diagnosis Code    Back pain M54.9    Essential hypertension I10    Acquired hypothyroidism E03.9    Advance directive discussed with patient Z70.80    Mixed hyperlipidemia E78.2    Neuropathic pain of both feet ([address] G62.9         Depression Risk Factor Screening:     PHQ over the last two weeks 4/19/2017   Little interest or pleasure in doing things Not at all   Feeling down, depressed or hopeless Not at all   Total Score PHQ 2 0     Alcohol Risk Factor Screening: On any occasion during the past 3 months, have you had more than 3 drinks containing alcohol? No    Do you average more than 7 drinks per week? No    Functional Ability and Level of Safety:     Hearing Loss   normal-to-mild    Activities of Daily Living   Self-care. Requires assistance with: no ADLs    Fall Risk     Fall Risk Assessment, last 12 mths 4/19/2017   Able to walk? Yes   Fall in past 12 months?  No Abuse Screen   Patient is not abused    Review of Systems   A comprehensive review of systems was negative except for that written in the HPI. Physical Examination     No exam data present    Evaluation of Cognitive Function:  Mood/affect:  happy  Appearance: age appropriate  Family member/caregiver input: None    Visit Vitals    /84    Pulse 86    Temp 98.7 °F (37.1 °C) (Oral)    Resp 18    Ht 5' 1\" (1.549 m)    Wt 188 lb (85.3 kg)    SpO2 97%    BMI 35.52 kg/m2     General appearance: alert, cooperative, no distress, appears stated age  Head: Normocephalic, without obvious abnormality, atraumatic  Eyes: negative  Ears: normal TM's and external ear canals AU  Nose: Nares normal. Septum midline. Mucosa normal. No drainage or sinus tenderness. Throat: Lips, mucosa, and tongue normal. Teeth and gums normal  Neck: supple, symmetrical, trachea midline, no adenopathy, thyroid: not enlarged, symmetric, no tenderness/mass/nodules, no carotid bruit and no JVD  Back: symmetric, no curvature. ROM normal. No CVA tenderness. Lungs: clear to auscultation bilaterally  Heart: regular rate and rhythm, S1, S2 normal, no murmur, click, rub or gallop  Abdomen: soft, non-tender. Bowel sounds normal. No masses,  no organomegaly  Extremities: extremities normal, atraumatic, no cyanosis or edema  Pulses: 2+ and symmetric  Lymph nodes: Cervical, supraclavicular, and axillary nodes normal.  Neurologic: Grossly normal    Patient Care Team:  Ping Hernández MD as PCP - Esha Pineda MD (Gastroenterology)  Lorie Hawthorne DPM (Podiatry)  Fernie Delaney MD (Neurology)  Stormy Herrera MD (Neurology)    Advice/Referrals/Counseling   Education and counseling provided:  Are appropriate based on today's review and evaluation  End-of-Life planning (with patient's consent)  Screening Mammography  Diabetes screening test      Assessment/Plan   Nilo Tellez was seen today for annual wellness visit.     Diagnoses and all orders for this visit:    Mixed hyperlipidemia - check lipids and will start metformin for weight loss and sugars if still high. -     LIPID PANEL    Routine general medical examination at a health care facility    Screening for alcoholism    Acquired hypothyroidism - appears euthyroid. Will check levels and adjust meds. -     TSH 3RD GENERATION  -     T4, FREE    Essential hypertension - BP well controlled. -     METABOLIC PANEL, COMPREHENSIVE    Neuropathic pain of both feet (HCC) - per neurology    Other orders  -     metFORMIN ER (GLUCOPHAGE XR) 500 mg tablet; Take 1 Tab by mouth daily (with dinner). Indications: PREVENTION OF TYPE 2 DIABETES MELLITUS       Follow-up Disposition: 6 months and as needed. Advised her to call back or return to office if symptoms worsen/change/persist.  Discussed expected course/resolution/complications of diagnosis in detail with patient. Medication risks/benefits/costs/interactions/alternatives discussed with patient. She was given an after visit summary which includes diagnoses, current medications, & vitals. She expressed understanding with the diagnosis and plan. Rory Dunn

## 2017-05-22 ENCOUNTER — HOSPITAL ENCOUNTER (OUTPATIENT)
Dept: LAB | Age: 67
Discharge: HOME | End: 2017-05-22
Payer: MEDICARE

## 2017-05-22 PROCEDURE — 80061 LIPID PANEL: CPT

## 2017-05-22 PROCEDURE — 80053 COMPREHEN METABOLIC PANEL: CPT

## 2017-05-22 PROCEDURE — 36415 COLL VENOUS BLD VENIPUNCTURE: CPT

## 2017-05-22 PROCEDURE — 84443 ASSAY THYROID STIM HORMONE: CPT

## 2017-05-22 PROCEDURE — 84439 ASSAY OF FREE THYROXINE: CPT

## 2017-05-23 LAB
ALBUMIN SERPL-MCNC: 4.3 G/DL (ref 3.6–4.8)
ALBUMIN/GLOB SERPL: 2 {RATIO} (ref 1.2–2.2)
ALP SERPL-CCNC: 40 IU/L (ref 39–117)
ALT SERPL-CCNC: 15 IU/L (ref 0–32)
AST SERPL-CCNC: 15 IU/L (ref 0–40)
BILIRUB SERPL-MCNC: 0.2 MG/DL (ref 0–1.2)
BUN SERPL-MCNC: 21 MG/DL (ref 8–27)
BUN/CREAT SERPL: 31 (ref 12–28)
CALCIUM SERPL-MCNC: 9.5 MG/DL (ref 8.7–10.3)
CHLORIDE SERPL-SCNC: 99 MMOL/L (ref 96–106)
CHOLEST SERPL-MCNC: 208 MG/DL (ref 100–199)
CO2 SERPL-SCNC: 23 MMOL/L (ref 18–29)
CREAT SERPL-MCNC: 0.67 MG/DL (ref 0.57–1)
GLOBULIN SER CALC-MCNC: 2.1 G/DL (ref 1.5–4.5)
GLUCOSE SERPL-MCNC: 115 MG/DL (ref 65–99)
HDLC SERPL-MCNC: 62 MG/DL
LDLC SERPL CALC-MCNC: 116 MG/DL (ref 0–99)
POTASSIUM SERPL-SCNC: 3.5 MMOL/L (ref 3.5–5.2)
PROT SERPL-MCNC: 6.4 G/DL (ref 6–8.5)
SODIUM SERPL-SCNC: 142 MMOL/L (ref 134–144)
T4 FREE SERPL-MCNC: 1.42 NG/DL (ref 0.82–1.77)
TRIGL SERPL-MCNC: 150 MG/DL (ref 0–149)
TSH SERPL DL<=0.005 MIU/L-ACNC: 3.95 UIU/ML (ref 0.45–4.5)
VLDLC SERPL CALC-MCNC: 30 MG/DL (ref 5–40)

## 2017-06-02 ENCOUNTER — TELEPHONE (OUTPATIENT)
Dept: NEUROLOGY | Age: 67
End: 2017-06-02

## 2017-06-02 ENCOUNTER — HOSPITAL ENCOUNTER (OUTPATIENT)
Dept: GENERAL RADIOLOGY | Age: 67
Discharge: HOME | End: 2017-06-02
Attending: NURSE PRACTITIONER
Payer: MEDICARE

## 2017-06-02 ENCOUNTER — OFFICE VISIT (OUTPATIENT)
Dept: NEUROLOGY | Age: 67
End: 2017-06-02

## 2017-06-02 VITALS
HEIGHT: 61 IN | WEIGHT: 188 LBS | DIASTOLIC BLOOD PRESSURE: 70 MMHG | RESPIRATION RATE: 20 BRPM | SYSTOLIC BLOOD PRESSURE: 132 MMHG | BODY MASS INDEX: 35.5 KG/M2

## 2017-06-02 DIAGNOSIS — M79.672 LEFT FOOT PAIN: ICD-10-CM

## 2017-06-02 DIAGNOSIS — G62.9 SMALL FIBER NEUROPATHY: ICD-10-CM

## 2017-06-02 DIAGNOSIS — G25.81 RLS (RESTLESS LEGS SYNDROME): Primary | ICD-10-CM

## 2017-06-02 PROCEDURE — 73630 X-RAY EXAM OF FOOT: CPT

## 2017-06-02 RX ORDER — ROPINIROLE 1 MG/1
TABLET, FILM COATED ORAL
Qty: 90 TAB | Refills: 1 | Status: SHIPPED | OUTPATIENT
Start: 2017-06-02 | End: 2017-10-10

## 2017-06-02 NOTE — PATIENT INSTRUCTIONS
[facility]   Statement to Patients  April 1, 2014      In an effort to ensure the large volume of patient prescription refills is processed in the most efficient and expeditious manner, we are asking our patients to assist us by calling your Pharmacy for all prescription refills, this will include also your  Mail Order Pharmacy. The pharmacy will contact our office electronically to continue the refill process. Please do not wait until the last minute to call your pharmacy. We need at least 48 hours (2days) to fill prescriptions. We also encourage you to call your pharmacy before going to  your prescription to make sure it is ready. With regard to controlled substance prescription refill requests (narcotic refills) that need to be picked up at our office, we ask your cooperation by providing us with at least 72 hours (3days) notice that you will need a refill. We will not refill narcotic prescription refill requests after 4:00pm on any weekday, Monday through Thursday, or after 2:00pm on Fridays, or on the weekends. We encourage everyone to explore another way of getting your prescription refill request processed using P[employer], our patient web portal through our electronic medical record system. [employer] is an efficient and effective way to communicate your medication request directly to the office and  downloadable as an thelma on your smart phone . [employer] also features a review functionality that allows you to view your medication list as well as leave messages for your physician. Are you ready to get connected? If so please review the attatched instructions or speak to any of our staff to get you set up right away! Thank you so much for your cooperation. Should you have any questions please contact our Practice Administrator. The Physicians and Staff,  [facility] [address]  What is a living will?   A living will is a legal form you use to write down the kind of care you want at the end of your life. It is used by the health professionals who will treat you if you aren't able to decide for yourself. If you put your wishes in writing, your loved ones and others will know what kind of care you want. They won't need to guess. This can ease your mind and be helpful to others. A living will is not the same as an estate or property will. An estate will explains what you want to happen with your money and property after you die. Is a living will a legal document? A living will is a legal document. Each state has its own laws about living [place]. If you move to another state, make sure that your living will is legal in the state where you now live. Or you might use a universal form that has been approved by many states. This kind of form can sometimes be completed and stored online. Your electronic copy will then be available wherever you have a connection to the Internet. In most cases, doctors will respect your wishes even if you have a form from a different state. · You don't need an  to complete a living will. But legal advice can be helpful if your state's laws are unclear, your health history is complicated, or your family can't agree on what should be in your living will. · You can change your living will at any time. Some people find that their wishes about end-of-life care change as their health changes. · In addition to making a living will, think about completing a medical power of  form. This form lets you name the person you want to make end-of-life treatment decisions for you (your \"health care agent\") if you're not able to. Many hospitals and nursing homes will give you the forms you need to complete a living will and a medical power of . · Your living will is used only if you can't make or communicate decisions for yourself anymore.  If you become able to make decisions again, you can accept or refuse any treatment, no matter what you wrote in your living will. · Your state may offer an online registry. This is a place where you can store your living will online so the doctors and nurses who need to treat you can find it right away. What should you think about when creating a living will? Talk about your end-of-life wishes with your family members and your doctor. Let them know what you want. That way the people making decisions for you won't be surprised by your choices. Think about these questions as you make your living will:  · Do you know enough about life support methods that might be used? If not, talk to your doctor so you know what might be done if you can't breathe on your own, your heart stops, or you're unable to swallow. · What things would you still want to be able to do after you receive life-support methods? Would you want to be able to walk? To speak? To eat on your own? To live without the help of machines? · If you have a choice, where do you want to be cared for? In your home? At a hospital or nursing home? · Do you want certain Jainism practices performed if you become very ill? · If you have a choice at the end of your life, where would you prefer to die? At home? In a hospital or nursing home? Somewhere else? · Would you prefer to be buried or cremated? · Do you want your organs to be donated after you die? What should you do with your living will? · Make sure that your family members and your health care agent have copies of your living will. · Give your doctor a copy of your living will to keep in your medical record. If you have more than one doctor, make sure that each one has a copy. · You may want to put a copy of your living will where it can be easily found. Where can you learn more? Go to http://malathi-alana.info/. Enter O851 in the search box to learn more about \"Learning About Living Yari Ruiz. \"  Current as of: February 24, 2016  Content Version: 11.2  © 1345-7698 [employer]. Care instructions adapted under license by [employer] (which disclaims liability or warranty for this information). If you have questions about a medical condition or this instruction, always ask your healthcare professional. [address] any warranty or liability for your use of this information. Advance Directives: Care Instructions  Your Care Instructions  An advance directive is a legal way to state your wishes at the end of your life. It tells your family and your doctor what to do if you can no longer say what you want. There are two main types of advance directives. You can change them any time that your wishes change. · A living will tells your family and your doctor your wishes about life support and other treatment. · A durable power of  for health care lets you name a person to make treatment decisions for you when you can't speak for yourself. This person is called a health care agent. If you do not have an advance directive, decisions about your medical care may be made by a doctor or a  who doesn't know you. It may help to think of an advance directive as a gift to the people who care for you. If you have one, they won't have to make tough decisions by themselves. Follow-up care is a key part of your treatment and safety. Be sure to make and go to all appointments, and call your doctor if you are having problems. It's also a good idea to know your test results and keep a list of the medicines you take. How can you care for yourself at home? · Discuss your wishes with your loved ones and your doctor. This way, there are no surprises. · Many states have a unique form. Or you might use a universal form that has been approved by many states. This kind of form can sometimes be completed and stored online.  Your electronic copy will then be available wherever you have a connection to the Internet. In most cases, doctors will respect your wishes even if you have a form from a different state. · You don't need a  to do an advance directive. But you may want to get legal advice. · Think about these questions when you prepare an advance directive:  ¨ Who do you want to make decisions about your medical care if you are not able to? Many people choose a family member or close friend. ¨ Do you know enough about life support methods that might be used? If not, talk to your doctor so you understand. ¨ What are you most afraid of that might happen? You might be afraid of having pain, losing your independence, or being kept alive by machines. ¨ Where would you prefer to die? Choices include your home, a hospital, or a nursing home. ¨ Would you like to have information about hospice care to support you and your family? ¨ Do you want to donate organs when you die? ¨ Do you want certain Confucianism practices performed before you die? If so, put your wishes in the advance directive. · Read your advance directive every year, and make changes as needed. When should you call for help? Be sure to contact your doctor if you have any questions. Where can you learn more? Go to http://malathi-alana.info/. Enter R264 in the search box to learn more about \"Advance Directives: Care Instructions. \"  Current as of: November 17, 2016  Content Version: 11.2  © 7170-2165 [employer]. Care instructions adapted under license by [employer] (which disclaims liability or warranty for this information). If you have questions about a medical condition or this instruction, always ask your healthcare professional. [address] any warranty or liability for your use of this information.

## 2017-06-02 NOTE — PROGRESS NOTES
Delphina Cockayne is a 78 y.o. female who presents with the following  No chief complaint on file. HPI Patient comes in for a follow up for neuropathy, RLS, and left foot pain around where she had her biopsy site in November 2016. She since the biopsy has had sensitivity, and pain in the foot along the peritoneal nerve route on the left foot. She still has the biopsy site therese and can pinpoint where the pain is and this radiates into her toes and bottom of her foot. She saw her podiatrist for she is getting plantar fasciitis injections and he said he was unsure as to the cause. She states at times the pain can be debilitating. She has underlying small fiber neuropathy with RLS and is on Requip 1 mg bedtime and horizant 600 mg daily for symptoms management and is doing very well with this. Feels like this is good dosing for her symptoms management. She is able to sleep well over night as her symptoms are worse then. She has no trouble with balance or walking. She will be traveling a lot soon and wants to evaluate her foot further. With the biopsy site she did develop what seemed to be an infection at the site shortly thereafter in which keflex took care of the pain, redness, and inflammation at the site. Allergies   Allergen Reactions    Sudafed [Pseudoephedrine Hcl] Palpitations    Codeine Unknown (comments)    Darvocet A500 [Propoxyphene N-Acetaminophen] Nausea and Vomiting       Current Outpatient Prescriptions   Medication Sig    OTHER Anton red    rOPINIRole (REQUIP) 1 mg tablet Take 1 tablet by mouth nightly    gabapentin enacarbil (HORIZANT) 600 mg TbER Take 1 Tab by mouth daily.  metFORMIN ER (GLUCOPHAGE XR) 500 mg tablet Take 1 Tab by mouth daily (with dinner). Indications: PREVENTION OF TYPE 2 DIABETES MELLITUS    conjugated estrogens (PREMARIN) 0.625 mg tablet Take 1 Tab by mouth daily.     chlorthalidone (HYGROTEN) 50 mg tablet TAKE ONE TABLET BY MOUTH ONCE DAILY (REPLACES  25  MG  DOSE)    levothyroxine (SYNTHROID) 112 mcg tablet TAKE ONE TABLET BY MOUTH ONCE DAILY BEFORE BREAKFAST    CLEMASTINE FUMARATE (ALLERGY RELIEF, CLEMASTINE, PO) Take  by mouth.  fenofibrate nanocrystallized (TRICOR) 145 mg tablet Take 1 Tab by mouth daily.  triamcinolone acetonide (KENALOG) 0.1 % topical cream Apply  to affected area two (2) times a day. use thin layer    triamcinolone (NASACORT AQ) 55 mcg nasal inhaler 2 Sprays as needed.  cholecalciferol (VITAMIN D3) 1,000 unit tablet Take 1,000 Units by mouth daily.  b complex vitamins tablet Take 1 Tab by mouth daily.  vitamin e (E GEMS) 1,000 unit capsule Take 1,000 Units by mouth daily.  Glucosamine &Chondroit-MV-Min3 274-086-59-0.5 mg Tab Take  by mouth.  MULTIVITAMINS (DAILY VITAMINS PO) Take 1 Tab by mouth daily. No current facility-administered medications for this visit. History   Smoking Status    Never Smoker   Smokeless Tobacco    Never Used       Past Medical History:   Diagnosis Date    Cancer ([facility] [address]     skin    Hypercholesterolemia     Hypertension     Thyroid disease        Past Surgical History:   Procedure Laterality Date    BREAST SURGERY PROCEDURE UNLISTED  ,3/2007,     breast biopsy    HX COLONOSCOPY  16    Dr. Lovely Guzman GYN       x 2, hysterectomy    HX MALIGNANT SKIN LESION EXCISION      skin cancer from nose       Family History   Problem Relation Age of Onset    Heart Disease Father      CAD, CABG    Diabetes Father      insulin    Hypertension Mother     Cancer Mother     Diabetes Brother     Kidney Disease Brother     Other Brother      PVD    Hypertension Brother     Hypertension Brother        Social History     Social History    Marital status:      Spouse name: N/A    Number of children: N/A    Years of education: N/A     Social History Main Topics    Smoking status: Never Smoker    Smokeless tobacco: Never Used    Alcohol use No    Drug use:  No  Sexual activity: Yes     Partners: Male     Other Topics Concern    Not on file     Social History Narrative       Review of Systems   HENT: Negative for hearing loss and tinnitus. Eyes: Negative for blurred vision, double vision and photophobia. Respiratory: Negative for shortness of breath and wheezing. Cardiovascular: Negative for chest pain and palpitations. Gastrointestinal: Negative for nausea and vomiting. Musculoskeletal: Negative for falls. Neurological: Positive for tingling and sensory change. Negative for weakness and headaches. Remainder of comprehensive review is negative. Physical Exam :    Visit Vitals    /70    Resp 20    Ht 5' 1\" (1.549 m)    Wt 85.3 kg (188 lb)    BMI 35.52 kg/m2       General: Well defined, nourished, and groomed individual in no acute distress.    Neck: Supple, nontender, no bruits, no pain with resistance to active range of motion.    Heart: Regular rate and rhythm, no murmurs, rub, or gallop. Normal S1S2. Lungs: Clear to auscultation bilaterally with equal chest expansion, no cough, no wheeze  Musculoskeletal: Extremities revealed no edema and had full range of motion of joints.    Psych: Good mood and bright affect    NEUROLOGICAL EXAMINATION:    Mental Status: Alert and oriented to person, place, and time    Cranial Nerves:    II, III, IV, VI: Visual acuity grossly intact. Visual fields are normal.    Pupils are equal, round, and reactive to light and accommodation.    Extra-ocular movements are full and fluid. Fundoscopic exam was benign, no ptosis or nystagmus.    V-XII: Hearing is grossly intact. Facial features are symmetric, with normal sensation and strength. The palate rises symmetrically and the tongue protrudes midline. Sternocleidomastoids 5/5. Motor Examination: Normal tone, bulk, and strength, 5/5 muscle strength throughout.      Coordination: Finger to nose was normal. No resting or intention tremor    Gait and Station: Steady while walking. Normal arm swing. No pronator drift. No muscle wasting or fasiculations noted. Reflexes: DTRs 2+ throughout. Results for orders placed or performed in visit on 05/19/17   TSH 3RD GENERATION   Result Value Ref Range    TSH 3.950 0.450 - 4.500 uIU/mL   T4, FREE   Result Value Ref Range    T4, Free 1.42 0.82 - 1.77 ng/dL   LIPID PANEL   Result Value Ref Range    Cholesterol, total 208 (H) 100 - 199 mg/dL    Triglyceride 150 (H) 0 - 149 mg/dL    HDL Cholesterol 62 >39 mg/dL    VLDL, calculated 30 5 - 40 mg/dL    LDL, calculated 116 (H) 0 - 99 mg/dL   METABOLIC PANEL, COMPREHENSIVE   Result Value Ref Range    Glucose 115 (H) 65 - 99 mg/dL    BUN 21 8 - 27 mg/dL    Creatinine 0.67 0.57 - 1.00 mg/dL    GFR est non-AA 92 >59 mL/min/1.73    GFR est  >59 mL/min/1.73    BUN/Creatinine ratio 31 (H) 12 - 28    Sodium 142 134 - 144 mmol/L    Potassium 3.5 3.5 - 5.2 mmol/L    Chloride 99 96 - 106 mmol/L    CO2 23 18 - 29 mmol/L    Calcium 9.5 8.7 - 10.3 mg/dL    Protein, total 6.4 6.0 - 8.5 g/dL    Albumin 4.3 3.6 - 4.8 g/dL    GLOBULIN, TOTAL 2.1 1.5 - 4.5 g/dL    A-G Ratio 2.0 1.2 - 2.2    Bilirubin, total 0.2 0.0 - 1.2 mg/dL    Alk. phosphatase 40 39 - 117 IU/L    AST (SGOT) 15 0 - 40 IU/L    ALT (SGPT) 15 0 - 32 IU/L       Orders Placed This Encounter    XR FOOT LT MIN 3 V     Standing Status:   Future     Number of Occurrences:   1     Standing Expiration Date:   7/2/2018     Order Specific Question:   Reason for Exam     Answer:   left foot pain and swelling     Order Specific Question:   Is Patient Allergic to Contrast Dye?      Answer:   No    EMG LIMITED     Nerve conduction of left leg post skin biopsy looking for peritoneal neuropathy     Standing Status:   Future     Standing Expiration Date:   12/2/2017     Order Specific Question:   Reason for Exam:     Answer:   foot pain    OTHER     Sig: Anton red    rOPINIRole (REQUIP) 1 mg tablet     Sig: Take 1 tablet by mouth nightly     Dispense:  90 Tab     Refill:  1    gabapentin enacarbil (HORIZANT) 600 mg TbER     Sig: Take 1 Tab by mouth daily. Dispense:  30 Tab     Refill:  5       1. RLS (restless legs syndrome)    2. Left foot pain        Follow-up Disposition:  Return in about 2 months (around 8/2/2017). Continue Requip 1 mg bedtime along with Horizant 600 mg daily for symptom management of her neuropathy and RLS as these are working well. We discussed her symptoms further and will evaluate her foot pain with an x ray and just a nerve conduction of her left leg looking specifically for a peritoneal nerve issue post skin biopsy. She understands her POC and has no questions. Will call with results.          This note will not be viewable in [employer]

## 2017-06-02 NOTE — TELEPHONE ENCOUNTER
RX was sent in for something different than what they have in the history so they want to make sure this is correct.  Please call

## 2017-06-02 NOTE — MR AVS SNAPSHOT
Visit Information Date & Time Provider Department Dept. Phone Encounter #  
 6/2/2017  8:00 AM Steph Eng NP Neurology [address] [phone] [id number] Follow-up Instructions Return in about 2 months (around 8/2/2017). Your Appointments 8/7/2017  8:30 AM  
Any with Steph Eng NP Neurology [address] [facility] Appt Note: f/u 6 months. .. $0Cp sesb 02/08/17 [place] [address] [place] [id number] [phone]  
  
   
 [place] [address] [id number] [address] [place] [address] Upcoming Health Maintenance Date Due INFLUENZA AGE 9 TO ADULT 8/1/2017 MEDICARE YEARLY EXAM 5/20/2018 GLAUCOMA SCREENING Q2Y 8/22/2018 BREAST CANCER SCRN MAMMOGRAM 11/1/2018 COLONOSCOPY 1/29/2021 DTaP/Tdap/Td series (2 - Td) 6/3/2023 Allergies as of 6/2/2017  Review Complete On: 6/2/2017 By: Steph Eng NP Severity Noted Reaction Type Reactions Sudafed [Pseudoephedrine Hcl] High 04/06/2011    Palpitations Codeine  02/25/2010    Unknown (comments) Darvocet A500 [Propoxyphene N-acetaminophen]  03/16/2004    Nausea and Vomiting Current Immunizations  Reviewed on 2/19/2016 Name Date Influenza Vaccine 12/16/2016, 10/1/2015, 11/1/2014 Pneumococcal Conjugate (PCV-13) 2/6/2016 Pneumococcal Polysaccharide (PPSV-23) 2/11/2017 Tdap 6/3/2013 Zoster Vaccine, Live 5/5/2016 Not reviewed this visit You Were Diagnosed With   
  
 Codes Comments RLS (restless legs syndrome)    -  Primary ICD-10-CM: G25.81 ICD-9-CM: 333.94 Left foot pain     ICD-10-CM: X84.095 ICD-9-CM: 729.5 Vitals BP Resp Height(growth percentile) Weight(growth percentile) BMI OB Status 132/70 20 5' 1\" (1.549 m) 188 lb (85.3 kg) 35.52 kg/m2 Hysterectomy Smoking Status Never Smoker Vitals History BMI and BSA Data Body Mass Index Body Surface Area 35.52 kg/m 2 1.92 m 2 Preferred Pharmacy Pharmacy Name Phone [facility] - [address], [place] [phone] Your Updated Medication List  
  
   
This list is accurate as of: 6/2/17  8:47 AM.  Always use your most recent med list.  
  
  
  
  
 ALLERGY RELIEF (CLEMASTINE) PO Take  by mouth.  
  
 b complex vitamins tablet Take 1 Tab by mouth daily. chlorthalidone 50 mg tablet Commonly known as:  HYGROTEN  
TAKE ONE TABLET BY MOUTH ONCE DAILY (REPLACES  25  MG  DOSE)  
  
 conjugated estrogens 0.625 mg tablet Commonly known as:  PREMARIN Take 1 Tab by mouth daily. DAILY VITAMINS PO Take 1 Tab by mouth daily. fenofibrate nanocrystallized 145 mg tablet Commonly known as:  [employer] Take 1 Tab by mouth daily. gabapentin enacarbil 600 mg Tber Commonly known as:  Winford Arrant Take 1 Tab by mouth daily. Glucosamine &Chondroit-MV-Min3 [id number] mg Tab Take  by mouth.  
  
 levothyroxine 112 mcg tablet Commonly known as:  SYNTHROID  
TAKE ONE TABLET BY MOUTH ONCE DAILY BEFORE BREAKFAST  
  
 metFORMIN  mg tablet Commonly known as:  GLUCOPHAGE XR Take 1 Tab by mouth daily (with dinner). Indications: PREVENTION OF TYPE 2 DIABETES MELLITUS  
  
 * NASACORT AQ 55 mcg nasal inhaler Generic drug:  triamcinolone 2 Sprays as needed. * triamcinolone acetonide 0.1 % topical cream  
Commonly known as:  KENALOG Apply  to affected area two (2) times a day. use thin layer OTHER Anton red  
  
 rOPINIRole 1 mg tablet Commonly known as:  Gregory Gauze Take 1 tablet by mouth nightly VITAMIN D3 1,000 unit tablet Generic drug:  cholecalciferol Take 1,000 Units by mouth daily. vitamin e 1,000 unit capsule Commonly known as:  E GEMS Take 1,000 Units by mouth daily. * Notice:   This list has 2 medication(s) that are the same as other medications prescribed for you. Read the directions carefully, and ask your doctor or other care provider to review them with you. Prescriptions Sent to Pharmacy Refills  
 rOPINIRole (REQUIP) 1 mg tablet 1 Sig: Take 1 tablet by mouth nightly Class: Normal  
 Pharmacy: [id number] [facility],[address], [address] [facility] Ph #: [phone]  
 gabapentin enacarbil (HORIZANT) 600 mg TbER 5 Sig: Take 1 Tab by mouth daily. Class: Normal  
 Pharmacy: [address] [place] [address] Ph #: [phone] Route: Oral  
  
Follow-up Instructions Return in about 2 months (around 8/2/2017). To-Do List   
 06/02/2017 Neurology:  EMG LIMITED   
  
 06/02/2017 Imaging:  XR FOOT LT MIN 3 V Patient Instructions PRESCRIPTION REFILL POLICY [facility] Statement to Patients April 1, 2014 In an effort to ensure the large volume of patient prescription refills is processed in the most efficient and expeditious manner, we are asking our patients to assist us by calling your Pharmacy for all prescription refills, this will include also your  Mail Order Pharmacy. The pharmacy will contact our office electronically to continue the refill process. Please do not wait until the last minute to call your pharmacy. We need at least 48 hours (2days) to fill prescriptions. We also encourage you to call your pharmacy before going to  your prescription to make sure it is ready. With regard to controlled substance prescription refill requests (narcotic refills) that need to be picked up at our office, we ask your cooperation by providing us with at least 72 hours (3days) notice that you will need a refill. We will not refill narcotic prescription refill requests after 4:00pm on any weekday, Monday through Thursday, or after 2:00pm on Fridays, or on the weekends. We encourage everyone to explore another way of getting your prescription refill request processed using [employer], our patient web portal through our electronic medical record system. [employer] is an efficient and effective way to communicate your medication request directly to the office and  downloadable as an thelma on your smart phone . [employer] also features a review functionality that allows you to view your medication list as well as leave messages for your physician. Are you ready to get connected? If so please review the attatched instructions or speak to any of our staff to get you set up right away! Thank you so much for your cooperation. Should you have any questions please contact our Practice Administrator. The Physicians and Staff,  [facility] [address] What is a living will? A living will is a legal form you use to write down the kind of care you want at the end of your life. It is used by the health professionals who will treat you if you aren't able to decide for yourself. If you put your wishes in writing, your loved ones and others will know what kind of care you want. They won't need to guess. This can ease your mind and be helpful to others. A living will is not the same as an estate or property will. An estate will explains what you want to happen with your money and property after you die. Is a living will a legal document? A living will is a legal document. Each state has its own laws about living [place]. If you move to another state, make sure that your living will is legal in the state where you now live. Or you might use a universal form that has been approved by many states. This kind of form can sometimes be completed and stored online. Your electronic copy will then be available wherever you have a connection to the Internet. In most cases, doctors will respect your wishes even if you have a form from a different state. · You don't need an  to complete a living will. But legal advice can be helpful if your state's laws are unclear, your health history is complicated, or your family can't agree on what should be in your living will. · You can change your living will at any time. Some people find that their wishes about end-of-life care change as their health changes. · In addition to making a living will, think about completing a medical power of  form. This form lets you name the person you want to make end-of-life treatment decisions for you (your \"health care agent\") if you're not able to. Many hospitals and nursing homes will give you the forms you need to complete a living will and a medical power of . · Your living will is used only if you can't make or communicate decisions for yourself anymore. If you become able to make decisions again, you can accept or refuse any treatment, no matter what you wrote in your living will. · Your state may offer an online registry. This is a place where you can store your living will online so the doctors and nurses who need to treat you can find it right away. What should you think about when creating a living will? Talk about your end-of-life wishes with your family members and your doctor. Let them know what you want. That way the people making decisions for you won't be surprised by your choices. Think about these questions as you make your living will: · Do you know enough about life support methods that might be used? If not, talk to your doctor so you know what might be done if you can't breathe on your own, your heart stops, or you're unable to swallow. · What things would you still want to be able to do after you receive life-support methods? Would you want to be able to walk? To speak? To eat on your own? To live without the help of machines? · If you have a choice, where do you want to be cared for? In your home? At a hospital or nursing home? · Do you want certain Mandaen practices performed if you become very ill? · If you have a choice at the end of your life, where would you prefer to die? At home? In a hospital or nursing home? Somewhere else? · Would you prefer to be buried or cremated? · Do you want your organs to be donated after you die? What should you do with your living will? · Make sure that your family members and your health care agent have copies of your living will. · Give your doctor a copy of your living will to keep in your medical record. If you have more than one doctor, make sure that each one has a copy. · You may want to put a copy of your living will where it can be easily found. Where can you learn more? Go to http://malathi-alana.info/. Enter Q164 in the search box to learn more about \"Learning About Living Mozella Babinski. \" Current as of: February 24, 2016 Content Version: 11.2 © 6889-7611 [employer]. Care instructions adapted under license by [employer] (which disclaims liability or warranty for this information). If you have questions about a medical condition or this instruction, always ask your healthcare professional. [place] [address] any warranty or liability for your use of this information. Advance Directives: Care Instructions Your Care Instructions An advance directive is a legal way to state your wishes at the end of your life. It tells your family and your doctor what to do if you can no longer say what you want. There are two main types of advance directives. You can change them any time that your wishes change. · A living will tells your family and your doctor your wishes about life support and other treatment. · A durable power of  for health care lets you name a person to make treatment decisions for you when you can't speak for yourself. This person is called a health care agent. If you do not have an advance directive, decisions about your medical care may be made by a doctor or a  who doesn't know you. It may help to think of an advance directive as a gift to the people who care for you. If you have one, they won't have to make tough decisions by themselves. Follow-up care is a key part of your treatment and safety. Be sure to make and go to all appointments, and call your doctor if you are having problems. It's also a good idea to know your test results and keep a list of the medicines you take. How can you care for yourself at home? · Discuss your wishes with your loved ones and your doctor. This way, there are no surprises. · Many states have a unique form. Or you might use a universal form that has been approved by many states. This kind of form can sometimes be completed and stored online. Your electronic copy will then be available wherever you have a connection to the Internet. In most cases, doctors will respect your wishes even if you have a form from a different state. · You don't need a  to do an advance directive. But you may want to get legal advice. · Think about these questions when you prepare an advance directive: ¨ Who do you want to make decisions about your medical care if you are not able to? Many people choose a family member or close friend. ¨ Do you know enough about life support methods that might be used? If not, talk to your doctor so you understand. ¨ What are you most afraid of that might happen? You might be afraid of having pain, losing your independence, or being kept alive by machines. ¨ Where would you prefer to die? Choices include your home, a hospital, or a nursing home. ¨ Would you like to have information about hospice care to support you and your family? ¨ Do you want to donate organs when you die? ¨ Do you want certain Buddhism practices performed before you die? If so, put your wishes in the advance directive. · Read your advance directive every year, and make changes as needed. When should you call for help? Be sure to contact your doctor if you have any questions. Where can you learn more? Go to http://malathi-alana.info/. Enter R264 in the search box to learn more about \"Advance Directives: Care Instructions. \" Current as of: November 17, 2016 Content Version: 11.2 © 5728-7972 [employer]. Care instructions adapted under license by [employer] (which disclaims liability or warranty for this information). If you have questions about a medical condition or this instruction, always ask your healthcare professional. [address] any warranty or liability for your use of this information. Introducing [facility]! Dear Yessy Aranda: Thank you for requesting a [employer] account. Our records indicate that you already have an active [employer] account. You can access your account anytime at [email or web address] Did you know that you can access your hospital and ER discharge instructions at any time in [employer]? You can also review all of your test results from your hospital stay or ER visit. Additional Information If you have questions, please visit the Frequently Asked Questions section of the [employer] website at [email or web address] Remember, [employer] is NOT to be used for urgent needs. For medical emergencies, dial 911. Now available from your iPhone and Android! Please provide this summary of care documentation to your next provider. Your primary care clinician is listed as Natali [email or web address] If you have any questions after today's visit, please call [phone].

## 2017-06-02 NOTE — TELEPHONE ENCOUNTER
Called and spoke to patient relayed per NP  The x ray showed Mild scattered degenerative changes across the entire foot. This can cause pain in some patients.  But nothing seen on or around the foot near the biopsy site  Patient states understanding   Confirmed upcoming appt

## 2017-06-02 NOTE — TELEPHONE ENCOUNTER
----- Message from Kosta Nguyen sent at 6/2/2017  1:37 PM EDT -----  Regarding: Philippe Blair, NP/telephone  Pt would like a return phone call from the nurse to discuss whether or not Philippe Blair has had time to review the x-ray results. Pt had x-rays done on her foot. Pt can be reached at [phone].

## 2017-06-05 ENCOUNTER — OFFICE VISIT (OUTPATIENT)
Dept: NEUROLOGY | Age: 67
End: 2017-06-05

## 2017-06-05 ENCOUNTER — TELEPHONE (OUTPATIENT)
Dept: NEUROLOGY | Age: 67
End: 2017-06-05

## 2017-06-05 DIAGNOSIS — R94.131 ABNORMAL EMG: Primary | ICD-10-CM

## 2017-06-05 DIAGNOSIS — R20.0 NUMBNESS OF LEFT FOOT: ICD-10-CM

## 2017-06-05 DIAGNOSIS — R20.2 PARESTHESIA: Primary | ICD-10-CM

## 2017-06-05 NOTE — TELEPHONE ENCOUNTER
Called and spoke to patient relayed per NP  Dr. Jigar Ch says the spot she is having her symptoms at he thinks is coming from the lumbar spine actually as the l5 dermatome provides nerve and feeling to that part of the foot. He does not see any damage to the nerve of the foot. Feels it is lower back and wants to get a MRI lumbar spine. Is that ok ? Also the small fiber neuropathy she has has progressed some into large fiber also   Patient states understanding and would like to proceed with MRI, also the meds prescribed will this help?   Would like for nurse to call once order is in

## 2017-06-05 NOTE — PROCEDURES
EMG/ NCS Report  [facility]ospit[facility], [address]  Ph: [phone]/ [phone]  FAX: [phone]/ [phone]  Test Date:  2017    Patient: Hilda Spurling : 1950 Physician: Chris Aleman MD   Sex: Female Height: ' \" Ref Phys: Imagene Casper   ID#: [id number] Weight:  lbs. Technician: Amelie Bangura     Patient History / Exam:  Left foot pain and numbness s/p skin biopsy for intraepidermal nerve analysis. (+) small fiber neuropathy (+) intermittent lower back pain radiating to left hip x 15 yrs. EMG & NCV Findings:  Evaluation of the left Fibular motor nerve showed normal distal onset latency (4.6 ms), normal amplitude (2.4 mV), normal conduction velocity (B Fib-Ankle, 45 m/s), and normal conduction velocity (Poplt-B Fib, 67 m/s). The left tibial motor nerve showed normal distal onset latency (4.3 ms), normal amplitude (7.7 mV), and normal conduction velocity (Knee-Ankle, 46 m/s). The left Sup Fibular sensory nerve showed no response (Lower leg). The left sural sensory and the right sural sensory nerves showed no response (Calf), no response (Site 2), and no response (Site 3). All F Wave latencies were within normal limits. Needle evaluation of the left extensor digitorum brevis and the left gluteus medius muscles showed diminished recruitment, Incr Duration, increased motor unit amplitude, and slightly increased polyphasic potentials. The left anterior tibialis muscle showed diminished recruitment. The left vastus lateralis muscle showed diminished recruitment, Incr Duration, and increased motor unit amplitude. The left posterior tibialis muscle showed diminished recruitment and Incr Duration. All remaining muscles (as indicated in the following table) showed no evidence of electrical instability.         Impression:  ABNORMAL    Extensive electrodiagnostic examination of the left lower extremity, with additional nerve conduction study on the right lower extremity, is compared to previous study done last 10/13/16. It shows the followin) Absent bilateral sural sensory responses and left superficial fibular sensory response which were present on the last study. This is consistent with interval progression showing evidence of distal sensory polyneuropathy. 2) Chronic, without active, motor axon loss changes in muscles innervated by left L5 root/segment suggestive of a superimposed chronic left L5 motor radiculopathy which was not noted before. Recommend radiological correlation.             Chris Aleman MD  Diplomate, American Board of Psychiatry and Neurology  Diplomate, Neuromuscular Medicine  Diplomate, American Board of Electrodiagnostic Medicine          Nerve Conduction Studies  Anti Sensory Summary Table     Stim Site NR Peak (ms) Norm Peak (ms) P-T Amp (µV) Norm P-T Amp Site1 Site2 Dist (cm)   Left Sup Fibular Anti Sensory (Lat ankle)  30.2°C   Lower leg NR  <4.6  >4 Lower leg Lat ankle 10.0   Left Sural Anti Sensory (Lat Mall)  30.6°C   Calf NR  <4.5  >4.0 Calf Lat Mall 14.0   Site 2 NR          Site 3 NR              4.5  3.1       Right Sural Anti Sensory (Lat Mall)  31.3°C   Calf NR  <4.5  >4.0 Calf Lat Mall 14.0   Site 2 NR          Site 3 NR            Motor Summary Table     Stim Site NR Onset (ms) Norm Onset (ms) O-P Amp (mV) Norm O-P Amp iAmp (mV) Site1 Site2 Dist (cm) Al (m/s) Norm Al (m/s)   Left Fibular Motor (Ext Dig Brev)  29.7°C   Ankle    4.6 <6.5 2.4 >1.1 3.0 Ankle Ext Dig Brev 8.0     B Fib    11.0  2.4  2.9 B Fib Ankle 29.0 45 >38   Poplt    12.5  2.4  2.8 Poplt B Fib 10.0 67 >42   Left Tibial Motor (Abd Osman Brev)  29.3°C   Ankle    4.3 <6.1 7.7 >1.1 9.0 Ankle Abd Osman Brev 8.0     Knee    12.1  4.9  6.4 Knee Ankle 36.0 46 >39     F Wave Studies     NR F-Lat (ms) Lat Norm (ms) L-R F-Lat (ms) L-R Lat Norm   Left Tibial (Mrkrs) (Abd Hallucis)  29.2°C      40.00 <56  <5.7     H Reflex Studies     NR H-Lat (ms) L-R H-Lat (ms) L-R Lat Norm   Left Tibial (Gastroc)  29°C      33.73  <2.0     EMG     Side Muscle Nerve Root Ins Act Fibs Psw Recrt Duration Amp Poly Comment   Left Ext Dig Brev Dp Br Peron L5, S1 Nml Nml Nml Reduced Incr Incr 1+    Left AbdHallucis MedPlantar S1-2 Nml Nml Nml Nml Nml Nml Nml    Left AntTibialis Dp Br Peron L4-5 Nml Nml Nml Reduced Nml Nml Nml    Left MedGastroc Tibial S1-2 Nml Nml Nml Nml Nml Nml Nml    Left VastusLat Femoral L2-4 Nml Nml Nml Reduced Incr Incr Nml    Left GluteusMed SupGluteal L4-S1 Nml Nml Nml Reduced Incr Incr 1+    Left Lower Lumb Parasp Rami L5,S1 Nml Nml Nml Nml Nml Nml Nml    Left PostTibialis Tibial L5, S1 Nml Nml Nml Reduced Incr Nml Nml                Nerve Conduction Studies  Anti Sensory Left/Right Comparison     Stim Site L Lat (ms) R Lat (ms) L-R Lat (ms) L Amp (µV) R Amp (µV) L-R Amp (%) Site1 Site2 L Al (m/s) R Al (m/s) L-R Al (m/s)   Sup Fibular Anti Sensory (Lat ankle)  30.2°C   Lower leg       Lower leg Lat ankle      Sural Anti Sensory (Lat Mall)  30.6°C   Calf       Calf Lat Mall      Site 2              Site 3               4.1   3.1            Motor Left/Right Comparison     Stim Site L Lat (ms) R Lat (ms) L-R Lat (ms) L Amp (mV) R Amp (mV) L-R Amp (%) Site1 Site2 L Al (m/s) R Al (m/s) L-R Al (m/s)   Fibular Motor (Ext Dig Brev)  29.7°C   Ankle 4.6   2.4   Ankle Ext Dig Brev      B Fib 11.0   2.4   B Fib Ankle 45     Poplt 12.5   2.4   Poplt B Fib 67     Tibial Motor (Abd Osman Brev)  29.3°C   Ankle 4.3   7.7   Ankle Abd Osman Brev      Knee 12.1   4.9   Knee Ankle 46           Waveforms:

## 2017-06-05 NOTE — TELEPHONE ENCOUNTER
Pt will be out of town by the time her EMG results come in. She wants dr to call her with the results when they are ready. If she doesn't answer she said a voicemail will be fine.

## 2017-06-05 NOTE — TELEPHONE ENCOUNTER
Called and spoke to patient relayed per np  porderd MRI. They will call her to schedule. Also the medicaitons will work for this type of neuropathy. If needs valium for MRI To help her relax we can order a tablet.  Once MRI results in we can call her  Patient states understanding and she doesn't need anything at this time

## 2017-06-06 ENCOUNTER — HOSPITAL ENCOUNTER (OUTPATIENT)
Dept: MRI IMAGING | Age: 67
Discharge: HOME | End: 2017-06-06
Payer: MEDICARE

## 2017-06-06 DIAGNOSIS — R20.0 NUMBNESS OF LEFT FOOT: ICD-10-CM

## 2017-06-06 DIAGNOSIS — R94.131 ABNORMAL EMG: ICD-10-CM

## 2017-06-06 PROCEDURE — A9585 GADOBUTROL INJECTION: HCPCS | Performed by: RADIOLOGY

## 2017-06-06 PROCEDURE — 72158 MRI LUMBAR SPINE W/O & W/DYE: CPT

## 2017-06-07 ENCOUNTER — TELEPHONE (OUTPATIENT)
Dept: NEUROLOGY | Age: 67
End: 2017-06-07

## 2017-06-07 ENCOUNTER — TELEPHONE (OUTPATIENT)
Dept: INTERNAL MEDICINE CLINIC | Age: 67
End: 2017-06-07

## 2017-06-07 DIAGNOSIS — G95.20 SPINAL CORD COMPRESSION (HCC): ICD-10-CM

## 2017-06-07 DIAGNOSIS — M47.14 THORACIC SPONDYLOSIS WITH CORD COMPRESSION: Primary | ICD-10-CM

## 2017-06-07 DIAGNOSIS — R93.7 ABNORMAL MRI, SPINE: ICD-10-CM

## 2017-06-07 DIAGNOSIS — R20.0 LEG NUMBNESS: ICD-10-CM

## 2017-06-07 DIAGNOSIS — G95.20 SPINAL CORD COMPRESSION (HCC): Primary | ICD-10-CM

## 2017-06-07 NOTE — TELEPHONE ENCOUNTER
Patient states Dr. Waleska Barnes told her to call and let him know how she was doing on the Metformin and that he would increase it if she wasn't having any problems. States she has not had any diarrhea or anything, so ok to increase it. States she is almost out of the med, so needs to know how he is thinking of increasing it. Please call.

## 2017-06-07 NOTE — TELEPHONE ENCOUNTER
----- Message from Yuni Ruiz NP sent at 6/7/2017 10:32 AM EDT -----  MRI of her lumbar spine showed spinal cord compression at a few levels. Worse in the  Thoracic spine at level t11-t12 and the radiologist wants to further look at this with a dedicated MRI of thoracic spine which i will order. Hopefully she can get this done before vacation as we can see to what extent. This can cause multitude of her symptoms as the spinal cord is being impinged on and should  Not be. We will also want to refer her to neurosurgery to evaluate the MRI And explain what they can do to help. Would [facility] [place] be closer for her?  Let me know thanks

## 2017-06-07 NOTE — TELEPHONE ENCOUNTER
Called and relayed per NP  Patient states understanding   Gave number to call to make mri appt  Will call once referral to give number of dr Moreno Wang who she choose to go to    [facility] and spoke to patient gave her dr Moreno Wang number and also emailed ERNIE to her at [email or web address]

## 2017-06-08 ENCOUNTER — HOSPITAL ENCOUNTER (OUTPATIENT)
Dept: MRI IMAGING | Age: 67
Discharge: HOME | End: 2017-06-08
Payer: MEDICARE

## 2017-06-08 DIAGNOSIS — M47.14 THORACIC SPONDYLOSIS WITH CORD COMPRESSION: ICD-10-CM

## 2017-06-08 DIAGNOSIS — G95.20 SPINAL CORD COMPRESSION (HCC): ICD-10-CM

## 2017-06-08 DIAGNOSIS — R93.7 ABNORMAL MRI, SPINE: ICD-10-CM

## 2017-06-08 DIAGNOSIS — R20.0 LEG NUMBNESS: ICD-10-CM

## 2017-06-08 PROCEDURE — 72146 MRI CHEST SPINE W/O DYE: CPT

## 2017-06-08 RX ORDER — METFORMIN HYDROCHLORIDE 1000 MG/1
1000 TABLET ORAL DAILY
Qty: 90 TAB | Refills: 0 | Status: SHIPPED | OUTPATIENT
Start: 2017-06-08 | End: 2017-09-01 | Stop reason: SDUPTHER

## 2017-06-08 RX ORDER — METFORMIN HYDROCHLORIDE 1000 MG/1
1 TABLET, FILM COATED, EXTENDED RELEASE ORAL DAILY
Qty: 90 TAB | Refills: 0 | Status: CANCELLED | OUTPATIENT
Start: 2017-06-08

## 2017-06-08 NOTE — TELEPHONE ENCOUNTER
Orders Placed This Encounter    metFORMIN (GLUCOPHAGE) 1,000 mg tablet     Sig: Take 1 Tab by mouth daily. Dispense:  90 Tab     Refill:  0     The above medication refills were approved via verbal order by Dr. Loc Stewart III.

## 2017-06-13 ENCOUNTER — TELEPHONE (OUTPATIENT)
Dept: NEUROLOGY | Age: 67
End: 2017-06-13

## 2017-06-13 NOTE — TELEPHONE ENCOUNTER
Called and spoke to patient relayed per NP  She has very severe spinal canal stenosis with cord compression on the t11-t12 vertebrae towards the bottom of her thoracic spine. The normal diameter of the thoracic spinal canal should be between 15-27.  Her diameter at this level is 7mm which is very tight.  This means her spinal cord is being intruded on at this level  Patient states understanding

## 2017-06-13 NOTE — TELEPHONE ENCOUNTER
Called and spoke to patient she is requesting mri results prior to appt with dr Mallika Márquez on 6/30

## 2017-06-13 NOTE — TELEPHONE ENCOUNTER
----- Message from Gabino Holm sent at 6/13/2017 11:20 AM EDT -----  Regarding: Dr. Swathi Hurley telephone   Pt request a call back regarding MRI results.  Pt best contact [phone]

## 2017-06-30 ENCOUNTER — TELEPHONE (OUTPATIENT)
Dept: NEUROLOGY | Age: 67
End: 2017-06-30

## 2017-07-13 RX ORDER — CHLORTHALIDONE 50 MG/1
TABLET ORAL
Qty: 90 TAB | Refills: 0 | Status: SHIPPED | OUTPATIENT
Start: 2017-07-13 | End: 2017-09-29 | Stop reason: SDUPTHER

## 2017-07-13 RX ORDER — LEVOTHYROXINE SODIUM 112 UG/1
TABLET ORAL
Qty: 90 TAB | Refills: 0 | Status: SHIPPED | OUTPATIENT
Start: 2017-07-13 | End: 2017-09-29 | Stop reason: SDUPTHER

## 2017-07-13 RX ORDER — FENOFIBRATE 145 MG/1
TABLET, COATED ORAL
Qty: 90 TAB | Refills: 0 | Status: SHIPPED | OUTPATIENT
Start: 2017-07-13 | End: 2017-09-29 | Stop reason: SDUPTHER

## 2017-07-13 NOTE — TELEPHONE ENCOUNTER
Orders Placed This Encounter    chlorthalidone (HYGROTEN) 50 mg tablet     Sig: TAKE ONE TABLET BY MOUTH ONCE DAILY (REPLACES  25  MG  DOSE)     Dispense:  90 Tab     Refill:  0    fenofibrate nanocrystallized (TRICOR) 145 mg tablet     Sig: TAKE ONE TABLET BY MOUTH ONCE DAILY     Dispense:  90 Tab     Refill:  0    levothyroxine (SYNTHROID) 112 mcg tablet     Sig: TAKE ONE TABLET BY MOUTH ONCE DAILY BEFORE BREAKFAST     Dispense:  90 Tab     Refill:  0     The above medication refills were approved via verbal order by Dr. Chay Verdugo III.

## 2017-08-07 ENCOUNTER — OFFICE VISIT (OUTPATIENT)
Dept: NEUROLOGY | Age: 67
End: 2017-08-07

## 2017-08-07 VITALS
SYSTOLIC BLOOD PRESSURE: 148 MMHG | BODY MASS INDEX: 35.3 KG/M2 | HEIGHT: 61 IN | WEIGHT: 187 LBS | DIASTOLIC BLOOD PRESSURE: 86 MMHG

## 2017-08-07 DIAGNOSIS — G25.81 RLS (RESTLESS LEGS SYNDROME): Primary | ICD-10-CM

## 2017-08-07 DIAGNOSIS — G62.9 SMALL FIBER NEUROPATHY: ICD-10-CM

## 2017-08-07 RX ORDER — ROPINIROLE 1 MG/1
TABLET, FILM COATED ORAL
Qty: 60 TAB | Refills: 5 | Status: SHIPPED | OUTPATIENT
Start: 2017-08-07 | End: 2018-04-14 | Stop reason: SDUPTHER

## 2017-08-07 NOTE — PROGRESS NOTES
Joesph Robles is a 78 y.o. female who presents with the following  Chief Complaint   Patient presents with    Follow-up       HPI Patient comes in for a follow up for RLS and back pain. Saw Dr. Isabel Marquez who sent her to Dr. Galvan Began for CHARISSE's and feels like this did help some. She it also getting PT and noticed some decrease symptoms and pain relief. She has been traveling a lot and being way more active so has noticed some swelling and fluid in her legs and the symptoms are close to back to where she was before the injection. To see Dr. Galvan Began next few weeks to evaluate for a 2nd round. Overall doing well with RLS but has noticed about 10 lnbs weight gain over the past year and may be horizant. On 600 mg daily and Requip 1 mg at night. Has not changed diet, exercise routine, or anything else. Allergies   Allergen Reactions    Sudafed [Pseudoephedrine Hcl] Palpitations    Codeine Unknown (comments)    Darvocet A500 [Propoxyphene N-Acetaminophen] Nausea and Vomiting       Current Outpatient Prescriptions   Medication Sig    rOPINIRole (REQUIP) 1 mg tablet Take 1 tablet by mouth BID    chlorthalidone (HYGROTEN) 50 mg tablet TAKE ONE TABLET BY MOUTH ONCE DAILY (REPLACES  25  MG  DOSE)    fenofibrate nanocrystallized (TRICOR) 145 mg tablet TAKE ONE TABLET BY MOUTH ONCE DAILY    levothyroxine (SYNTHROID) 112 mcg tablet TAKE ONE TABLET BY MOUTH ONCE DAILY BEFORE BREAKFAST    metFORMIN (GLUCOPHAGE) 1,000 mg tablet Take 1 Tab by mouth daily.  OTHER Anton red    rOPINIRole (REQUIP) 1 mg tablet Take 1 tablet by mouth nightly    gabapentin enacarbil (HORIZANT) 600 mg TbER Take 1 Tab by mouth daily.  conjugated estrogens (PREMARIN) 0.625 mg tablet Take 1 Tab by mouth daily.  CLEMASTINE FUMARATE (ALLERGY RELIEF, CLEMASTINE, PO) Take  by mouth.  triamcinolone acetonide (KENALOG) 0.1 % topical cream Apply  to affected area two (2) times a day.  use thin layer    triamcinolone (NASACORT AQ) 55 mcg nasal inhaler 2 Sprays as needed.  cholecalciferol (VITAMIN D3) 1,000 unit tablet Take 1,000 Units by mouth daily.  b complex vitamins tablet Take 1 Tab by mouth daily.  vitamin e (E GEMS) 1,000 unit capsule Take 1,000 Units by mouth daily.  Glucosamine &Chondroit-MV-Min3 488-998-35-0.5 mg Tab Take  by mouth.  MULTIVITAMINS (DAILY VITAMINS PO) Take 1 Tab by mouth daily. No current facility-administered medications for this visit. History   Smoking Status    Never Smoker   Smokeless Tobacco    Never Used       Past Medical History:   Diagnosis Date    Cancer ([facility] [address]     skin    Hypercholesterolemia     Hypertension     Thyroid disease        Past Surgical History:   Procedure Laterality Date    BREAST SURGERY PROCEDURE UNLISTED  ,3/2007,     breast biopsy    HX COLONOSCOPY  16    Dr. Nichole Mix GYN       x 2, hysterectomy    HX MALIGNANT SKIN LESION EXCISION  2012    skin cancer from nose       Family History   Problem Relation Age of Onset    Heart Disease Father      CAD, CABG    Diabetes Father      insulin    Hypertension Mother     Cancer Mother     Diabetes Brother     Kidney Disease Brother     Other Brother      PVD    Hypertension Brother     Hypertension Brother        Social History     Social History    Marital status:      Spouse name: N/A    Number of children: N/A    Years of education: N/A     Social History Main Topics    Smoking status: Never Smoker    Smokeless tobacco: Never Used    Alcohol use No    Drug use: No    Sexual activity: Yes     Partners: Male     Other Topics Concern    None     Social History Narrative       Review of Systems   HENT: Negative for hearing loss. Eyes: Negative for blurred vision, double vision and photophobia. Respiratory: Negative for shortness of breath and wheezing. Cardiovascular: Negative for chest pain and palpitations. Gastrointestinal: Negative for nausea and vomiting. Musculoskeletal: Positive for back pain. Neurological: Positive for tingling and sensory change. Negative for dizziness, tremors, seizures, loss of consciousness, weakness and headaches. Remainder of comprehensive review is negative. Physical Exam :    Visit Vitals    /86    Ht 5' 1\" (1.549 m)    Wt 84.8 kg (187 lb)    BMI 35.33 kg/m2       General: Well defined, nourished, and groomed individual in no acute distress.    Neck: Supple, nontender, no bruits, no pain with resistance to active range of motion.    Heart: Regular rate and rhythm, no murmurs, rub, or gallop. Normal S1S2. Lungs: Clear to auscultation bilaterally with equal chest expansion, no cough, no wheeze  Musculoskeletal: Extremities revealed no edema and had full range of motion of joints.    Psych: Good mood and bright affect    NEUROLOGICAL EXAMINATION:    Mental Status: Alert and oriented to person, place, and time    Cranial Nerves:    II, III, IV, VI: Visual acuity grossly intact. Visual fields are normal.    Pupils are equal, round, and reactive to light and accommodation.    Extra-ocular movements are full and fluid. Fundoscopic exam was benign, no ptosis or nystagmus.    V-XII: Hearing is grossly intact. Facial features are symmetric, with normal sensation and strength. The palate rises symmetrically and the tongue protrudes midline. Sternocleidomastoids 5/5. Motor Examination: Normal tone, bulk, and strength, 5/5 muscle strength throughout. Coordination: Finger to nose was normal. No resting or intention tremor    Gait and Station: Steady while walking. Normal arm swing. No pronator drift. No muscle wasting or fasiculations noted. Reflexes: DTRs 2+ throughout.             Results for orders placed or performed in visit on 05/19/17   TSH 3RD GENERATION   Result Value Ref Range    TSH 3.950 0.450 - 4.500 uIU/mL   T4, FREE   Result Value Ref Range    T4, Free 1.42 0.82 - 1.77 ng/dL   LIPID PANEL   Result Value Ref Range    Cholesterol, total 208 (H) 100 - 199 mg/dL    Triglyceride 150 (H) 0 - 149 mg/dL    HDL Cholesterol 62 >39 mg/dL    VLDL, calculated 30 5 - 40 mg/dL    LDL, calculated 116 (H) 0 - 99 mg/dL   METABOLIC PANEL, COMPREHENSIVE   Result Value Ref Range    Glucose 115 (H) 65 - 99 mg/dL    BUN 21 8 - 27 mg/dL    Creatinine 0.67 0.57 - 1.00 mg/dL    GFR est non-AA 92 >59 mL/min/1.73    GFR est  >59 mL/min/1.73    BUN/Creatinine ratio 31 (H) 12 - 28    Sodium 142 134 - 144 mmol/L    Potassium 3.5 3.5 - 5.2 mmol/L    Chloride 99 96 - 106 mmol/L    CO2 23 18 - 29 mmol/L    Calcium 9.5 8.7 - 10.3 mg/dL    Protein, total 6.4 6.0 - 8.5 g/dL    Albumin 4.3 3.6 - 4.8 g/dL    GLOBULIN, TOTAL 2.1 1.5 - 4.5 g/dL    A-G Ratio 2.0 1.2 - 2.2    Bilirubin, total 0.2 0.0 - 1.2 mg/dL    Alk. phosphatase 40 39 - 117 IU/L    AST (SGOT) 15 0 - 40 IU/L    ALT (SGPT) 15 0 - 32 IU/L       Orders Placed This Encounter    rOPINIRole (REQUIP) 1 mg tablet     Sig: Take 1 tablet by mouth BID     Dispense:  60 Tab     Refill:  5       No diagnosis found. Follow-up Disposition:  Return will calll. We discussed RLS and horizant. We will try to come off this and make up for the stopping by incresing the Requip to 1 mg BID. We can even increase to TID or increase night dosing if things are worse at night to get full coverage. She will try to make some changes and let us know where she wants to end up and if the stopping horizant changes anything. If not we can restart. Or see how she is doing. Continue to follow with pain management, orthopedics, and get back into PT as this was working well before vacation to [place]. Call with any other changes.        This note will not be viewable in [employer]

## 2017-08-07 NOTE — MR AVS SNAPSHOT
Visit Information Date & Time Provider Department Dept. Phone Encounter #  
 8/7/2017  8:30 AM EROS Cabrera [facility] [phone] [id number] Follow-up Instructions Return will calll. Upcoming Health Maintenance Date Due INFLUENZA AGE 9 TO ADULT 8/1/2017 MEDICARE YEARLY EXAM 5/20/2018 GLAUCOMA SCREENING Q2Y 8/22/2018 BREAST CANCER SCRN MAMMOGRAM 11/1/2018 COLONOSCOPY 1/29/2021 DTaP/Tdap/Td series (2 - Td) 6/3/2023 Allergies as of 8/7/2017  Review Complete On: 8/7/2017 By: Mason Figueroa Severity Noted Reaction Type Reactions Sudafed [Pseudoephedrine Hcl] High 04/06/2011    Palpitations Codeine  02/25/2010    Unknown (comments) Darvocet A500 [Propoxyphene N-acetaminophen]  03/16/2004    Nausea and Vomiting Current Immunizations  Reviewed on 2/19/2016 Name Date Influenza Vaccine 12/16/2016, 10/1/2015, 11/1/2014 Pneumococcal Conjugate (PCV-13) 2/6/2016 Pneumococcal Polysaccharide (PPSV-23) 2/11/2017 Tdap 6/3/2013 Zoster Vaccine, Live 5/5/2016 Not reviewed this visit Vitals BP Height(growth percentile) Weight(growth percentile) BMI OB Status Smoking Status 148/86 5' 1\" (1.549 m) 187 lb (84.8 kg) 35.33 kg/m2 Hysterectomy Never Smoker Vitals History BMI and BSA Data Body Mass Index Body Surface Area  
 35.33 kg/m 2 1.91 m 2 Preferred Pharmacy Pharmacy Name Phone [facility] [address] [place] [address], [address] [place] [address],[facility] Floor [phone] Your Updated Medication List  
  
   
This list is accurate as of: 8/7/17  9:30 AM.  Always use your most recent med list.  
  
  
  
  
 ALLERGY RELIEF (CLEMASTINE) PO Take  by mouth.  
  
 b complex vitamins tablet Take 1 Tab by mouth daily. chlorthalidone 50 mg tablet Commonly known as:  HYGROTEN  
TAKE ONE TABLET BY MOUTH ONCE DAILY (REPLACES  25  MG  DOSE) conjugated estrogens 0.625 mg tablet Commonly known as:  PREMARIN Take 1 Tab by mouth daily. DAILY VITAMINS PO Take 1 Tab by mouth daily. fenofibrate nanocrystallized 145 mg tablet Commonly known as:  TRICOR  
TAKE ONE TABLET BY MOUTH ONCE DAILY  
  
 gabapentin enacarbil 600 mg Tber Commonly known as:  Janus Bearded Take 1 Tab by mouth daily. Glucosamine &Chondroit-MV-Min3 774-554-70-0.5 mg Tab Take  by mouth.  
  
 levothyroxine 112 mcg tablet Commonly known as:  SYNTHROID  
TAKE ONE TABLET BY MOUTH ONCE DAILY BEFORE BREAKFAST  
  
 metFORMIN 1,000 mg tablet Commonly known as:  GLUCOPHAGE Take 1 Tab by mouth daily. * NASACORT AQ 55 mcg nasal inhaler Generic drug:  triamcinolone 2 Sprays as needed. * triamcinolone acetonide 0.1 % topical cream  
Commonly known as:  KENALOG Apply  to affected area two (2) times a day. use thin layer OTHER Anton red * rOPINIRole 1 mg tablet Commonly known as:  [place] Navy Take 1 tablet by mouth nightly * rOPINIRole 1 mg tablet Commonly known as:  [place] [employer] Take 1 tablet by mouth BID  
  
 VITAMIN D3 1,000 unit tablet Generic drug:  cholecalciferol Take 1,000 Units by mouth daily. vitamin e 1,000 unit capsule Commonly known as:  E GEMS Take 1,000 Units by mouth daily. * Notice: This list has 4 medication(s) that are the same as other medications prescribed for you. Read the directions carefully, and ask your doctor or other care provider to review them with you. Prescriptions Printed Refills  
 rOPINIRole (REQUIP) 1 mg tablet 5 Sig: Take 1 tablet by mouth BID Class: Print Follow-up Instructions Return will calll. Patient Instructions PRESCRIPTION REFILL POLICY [facility] Statement to Patients April 1, 2014 In an effort to ensure the large volume of patient prescription refills is processed in the most efficient and expeditious manner, we are asking our patients to assist us by calling your Pharmacy for all prescription refills, this will include also your  Mail Order Pharmacy. The pharmacy will contact our office electronically to continue the refill process. Please do not wait until the last minute to call your pharmacy. We need at least 48 hours (2days) to fill prescriptions. We also encourage you to call your pharmacy before going to  your prescription to make sure it is ready. With regard to controlled substance prescription refill requests (narcotic refills) that need to be picked up at our office, we ask your cooperation by providing us with at least 72 hours (3days) notice that you will need a refill. We will not refill narcotic prescription refill requests after 4:00pm on any weekday, Monday through Thursday, or after 2:00pm on Fridays, or on the weekends. We encourage everyone to explore another way of getting your prescription refill request processed using [employer], our patient web portal through our electronic medical record system. [employer] is an efficient and effective way to communicate your medication request directly to the office and  downloadable as an thelma on your smart phone . [employer] also features a review functionality that allows you to view your medication list as well as leave messages for your physician. Are you ready to get connected? If so please review the attatched instructions or speak to any of our staff to get you set up right away! Thank you so much for your cooperation. Should you have any questions please contact our Practice Administrator. The Physicians and Staff,  [facility] Arian Aleman [address] What is a living will? A living will is a legal form you use to write down the kind of care you want at the end of your life.  It is used by the health professionals who will treat you if you aren't able to decide for yourself. If you put your wishes in writing, your loved ones and others will know what kind of care you want. They won't need to guess. This can ease your mind and be helpful to others. A living will is not the same as an estate or property will. An estate will explains what you want to happen with your money and property after you die. Is a living will a legal document? A living will is a legal document. Each state has its own laws about living [place]. If you move to another state, make sure that your living will is legal in the state where you now live. Or you might use a universal form that has been approved by many states. This kind of form can sometimes be completed and stored online. Your electronic copy will then be available wherever you have a connection to the Internet. In most cases, doctors will respect your wishes even if you have a form from a different state. · You don't need an  to complete a living will. But legal advice can be helpful if your state's laws are unclear, your health history is complicated, or your family can't agree on what should be in your living will. · You can change your living will at any time. Some people find that their wishes about end-of-life care change as their health changes. · In addition to making a living will, think about completing a medical power of  form. This form lets you name the person you want to make end-of-life treatment decisions for you (your \"health care agent\") if you're not able to. Many hospitals and nursing homes will give you the forms you need to complete a living will and a medical power of . · Your living will is used only if you can't make or communicate decisions for yourself anymore. If you become able to make decisions again, you can accept or refuse any treatment, no matter what you wrote in your living will. · Your state may offer an online registry. This is a place where you can store your living will online so the doctors and nurses who need to treat you can find it right away. What should you think about when creating a living will? Talk about your end-of-life wishes with your family members and your doctor. Let them know what you want. That way the people making decisions for you won't be surprised by your choices. Think about these questions as you make your living will: · Do you know enough about life support methods that might be used? If not, talk to your doctor so you know what might be done if you can't breathe on your own, your heart stops, or you're unable to swallow. · What things would you still want to be able to do after you receive life-support methods? Would you want to be able to walk? To speak? To eat on your own? To live without the help of machines? · If you have a choice, where do you want to be cared for? In your home? At a hospital or nursing home? · Do you want certain Baptist practices performed if you become very ill? · If you have a choice at the end of your life, where would you prefer to die? At home? In a hospital or nursing home? Somewhere else? · Would you prefer to be buried or cremated? · Do you want your organs to be donated after you die? What should you do with your living will? · Make sure that your family members and your health care agent have copies of your living will. · Give your doctor a copy of your living will to keep in your medical record. If you have more than one doctor, make sure that each one has a copy. · You may want to put a copy of your living will where it can be easily found. Where can you learn more? Go to http://malathi-alana.info/. Enter Z081 in the search box to learn more about \"Learning About Living Lit. \" Current as of: August 8, 2016 Content Version: 11.3 © 1592-4833 Healthwise, IngBoo. Care instructions adapted under license by Gient (which disclaims liability or warranty for this information). If you have questions about a medical condition or this instruction, always ask your healthcare professional. [address] any warranty or liability for your use of this information. Advance Directives: Care Instructions Your Care Instructions An advance directive is a legal way to state your wishes at the end of your life. It tells your family and your doctor what to do if you can no longer say what you want. There are two main types of advance directives. You can change them any time that your wishes change. · A living will tells your family and your doctor your wishes about life support and other treatment. · A durable power of  for health care lets you name a person to make treatment decisions for you when you can't speak for yourself. This person is called a health care agent. If you do not have an advance directive, decisions about your medical care may be made by a doctor or a  who doesn't know you. It may help to think of an advance directive as a gift to the people who care for you. If you have one, they won't have to make tough decisions by themselves. Follow-up care is a key part of your treatment and safety. Be sure to make and go to all appointments, and call your doctor if you are having problems. It's also a good idea to know your test results and keep a list of the medicines you take. How can you care for yourself at home? · Discuss your wishes with your loved ones and your doctor. This way, there are no surprises. · Many states have a unique form. Or you might use a universal form that has been approved by many states. This kind of form can sometimes be completed and stored online.  Your electronic copy will then be available wherever you have a connection to the Internet. In most cases, doctors will respect your wishes even if you have a form from a different state. · You don't need a  to do an advance directive. But you may want to get legal advice. · Think about these questions when you prepare an advance directive: ¨ Who do you want to make decisions about your medical care if you are not able to? Many people choose a family member or close friend. ¨ Do you know enough about life support methods that might be used? If not, talk to your doctor so you understand. ¨ What are you most afraid of that might happen? You might be afraid of having pain, losing your independence, or being kept alive by machines. ¨ Where would you prefer to die? Choices include your home, a hospital, or a nursing home. ¨ Would you like to have information about hospice care to support you and your family? ¨ Do you want to donate organs when you die? ¨ Do you want certain Catholic practices performed before you die? If so, put your wishes in the advance directive. · Read your advance directive every year, and make changes as needed. When should you call for help? Be sure to contact your doctor if you have any questions. Where can you learn more? Go to http://malathi-alana.info/. Enter R264 in the search box to learn more about \"Advance Directives: Care Instructions. \" Current as of: November 17, 2016 Content Version: 11.3 © 1525-7289 [employer]. Care instructions adapted under license by [employer] (which disclaims liability or warranty for this information). If you have questions about a medical condition or this instruction, always ask your healthcare professional. [address] any warranty or liability for your use of this information. Introducing [facility]! Dear Lonny: Thank you for requesting a Pearl.[email or web address] account.   Our records indicate that you already have an active [employer] account. You can access your account anytime at [email or web address] Did you know that you can access your hospital and ER discharge instructions at any time in [employer]? You can also review all of your test results from your hospital stay or ER visit. Additional Information If you have questions, please visit the Frequently Asked Questions section of the [employer] website at [email or web address] Remember, [employer] is NOT to be used for urgent needs. For medical emergencies, dial 911. Now available from your iPhone and Android! Please provide this summary of care documentation to your next provider. Your primary care clinician is listed as Natali [phone] If you have any questions after today's visit, please call [phone].

## 2017-08-07 NOTE — PATIENT INSTRUCTIONS
[address] [facility]   Statement to Patients  April 1, 2014      In an effort to ensure the large volume of patient prescription refills is processed in the most efficient and expeditious manner, we are asking our patients to assist us by calling your Pharmacy for all prescription refills, this will include also your  Mail Order Pharmacy. The pharmacy will contact our office electronically to continue the refill process. Please do not wait until the last minute to call your pharmacy. We need at least 48 hours (2days) to fill prescriptions. We also encourage you to call your pharmacy before going to  your prescription to make sure it is ready. With regard to controlled substance prescription refill requests (narcotic refills) that need to be picked up at our office, we ask your cooperation by providing us with at least 72 hours (3days) notice that you will need a refill. We will not refill narcotic prescription refill requests after 4:00pm on any weekday, Monday through Thursday, or after 2:00pm on Fridays, or on the weekends. We encourage everyone to explore another way of getting your prescription refill request processed using [employer], our patient web portal through our electronic medical record system. [employer] is an efficient and effective way to communicate your medication request directly to the office and  downloadable as an thelma on your smart phone . [employer] also features a review functionality that allows you to view your medication list as well as leave messages for your physician. Are you ready to get connected? If so please review the attatched instructions or speak to any of our staff to get you set up right away! Thank you so much for your cooperation. Should you have any questions please contact our Practice Administrator. The Physicians and Staff,  [facility] [address] [address]  What is a living will?   A living will is a legal form you use to write down the kind of care you want at the end of your life. It is used by the health professionals who will treat you if you aren't able to decide for yourself. If you put your wishes in writing, your loved ones and others will know what kind of care you want. They won't need to guess. This can ease your mind and be helpful to others. A living will is not the same as an estate or property will. An estate will explains what you want to happen with your money and property after you die. Is a living will a legal document? A living will is a legal document. Each state has its own laws about living [place]. If you move to another state, make sure that your living will is legal in the state where you now live. Or you might use a universal form that has been approved by many states. This kind of form can sometimes be completed and stored online. Your electronic copy will then be available wherever you have a connection to the Internet. In most cases, doctors will respect your wishes even if you have a form from a different state. · You don't need an  to complete a living will. But legal advice can be helpful if your state's laws are unclear, your health history is complicated, or your family can't agree on what should be in your living will. · You can change your living will at any time. Some people find that their wishes about end-of-life care change as their health changes. · In addition to making a living will, think about completing a medical power of  form. This form lets you name the person you want to make end-of-life treatment decisions for you (your \"health care agent\") if you're not able to. Many hospitals and nursing homes will give you the forms you need to complete a living will and a medical power of . · Your living will is used only if you can't make or communicate decisions for yourself anymore.  If you become able to make decisions again, you can accept or refuse any treatment, no matter what you wrote in your living will. · Your state may offer an online registry. This is a place where you can store your living will online so the doctors and nurses who need to treat you can find it right away. What should you think about when creating a living will? Talk about your end-of-life wishes with your family members and your doctor. Let them know what you want. That way the people making decisions for you won't be surprised by your choices. Think about these questions as you make your living will:  · Do you know enough about life support methods that might be used? If not, talk to your doctor so you know what might be done if you can't breathe on your own, your heart stops, or you're unable to swallow. · What things would you still want to be able to do after you receive life-support methods? Would you want to be able to walk? To speak? To eat on your own? To live without the help of machines? · If you have a choice, where do you want to be cared for? In your home? At a hospital or nursing home? · Do you want certain Christian practices performed if you become very ill? · If you have a choice at the end of your life, where would you prefer to die? At home? In a hospital or nursing home? Somewhere else? · Would you prefer to be buried or cremated? · Do you want your organs to be donated after you die? What should you do with your living will? · Make sure that your family members and your health care agent have copies of your living will. · Give your doctor a copy of your living will to keep in your medical record. If you have more than one doctor, make sure that each one has a copy. · You may want to put a copy of your living will where it can be easily found. Where can you learn more? Go to http://malathi-alana.info/. Enter N194 in the search box to learn more about \"Learning About Living Perhomar. \"  Current as of: August 8, 2016  Content Version: 11.3  © 2337-3482 "[employer]o Technology[employer]". Care instructions adapted under license by [employer]fit - Fitness for the rest of us (which disclaims liability or warranty for this information). If you have questions about a medical condition or this instruction, always ask your healthcare professional. [facility] any warranty or liability for your use of this information. Advance Directives: Care Instructions  Your Care Instructions  An advance directive is a legal way to state your wishes at the end of your life. It tells your family and your doctor what to do if you can no longer say what you want. There are two main types of advance directives. You can change them any time that your wishes change. · A living will tells your family and your doctor your wishes about life support and other treatment. · A durable power of  for health care lets you name a person to make treatment decisions for you when you can't speak for yourself. This person is called a health care agent. If you do not have an advance directive, decisions about your medical care may be made by a doctor or a  who doesn't know you. It may help to think of an advance directive as a gift to the people who care for you. If you have one, they won't have to make tough decisions by themselves. Follow-up care is a key part of your treatment and safety. Be sure to make and go to all appointments, and call your doctor if you are having problems. It's also a good idea to know your test results and keep a list of the medicines you take. How can you care for yourself at home? · Discuss your wishes with your loved ones and your doctor. This way, there are no surprises. · Many states have a unique form. Or you might use a universal form that has been approved by many states. This kind of form can sometimes be completed and stored online.  Your electronic copy will then be available wherever you have a connection to the Internet. In most cases, doctors will respect your wishes even if you have a form from a different state. · You don't need a  to do an advance directive. But you may want to get legal advice. · Think about these questions when you prepare an advance directive:  ¨ Who do you want to make decisions about your medical care if you are not able to? Many people choose a family member or close friend. ¨ Do you know enough about life support methods that might be used? If not, talk to your doctor so you understand. ¨ What are you most afraid of that might happen? You might be afraid of having pain, losing your independence, or being kept alive by machines. ¨ Where would you prefer to die? Choices include your home, a hospital, or a nursing home. ¨ Would you like to have information about hospice care to support you and your family? ¨ Do you want to donate organs when you die? ¨ Do you want certain Buddhism practices performed before you die? If so, put your wishes in the advance directive. · Read your advance directive every year, and make changes as needed. When should you call for help? Be sure to contact your doctor if you have any questions. Where can you learn more? Go to http://malathi-alana.info/. Enter R264 in the search box to learn more about \"Advance Directives: Care Instructions. \"  Current as of: November 17, 2016  Content Version: 11.3  © 3833-3622 Newshubby. Care instructions adapted under license by Lodo Software (which disclaims liability or warranty for this information). If you have questions about a medical condition or this instruction, always ask your healthcare professional. [address] any warranty or liability for your use of this information.

## 2017-09-01 RX ORDER — METFORMIN HYDROCHLORIDE 1000 MG/1
TABLET ORAL
Qty: 90 TAB | Refills: 0 | Status: SHIPPED | OUTPATIENT
Start: 2017-09-01 | End: 2017-11-09 | Stop reason: SDUPTHER

## 2017-09-28 ENCOUNTER — TELEPHONE (OUTPATIENT)
Dept: INTERNAL MEDICINE CLINIC | Age: 67
End: 2017-09-28

## 2017-09-28 NOTE — TELEPHONE ENCOUNTER
dr krys Major Noss [phone]     fax [phone]  QOG: lindsey bowden    Copy of recent office notes for pt's appt today at 11:00

## 2017-09-29 RX ORDER — LEVOTHYROXINE SODIUM 112 UG/1
TABLET ORAL
Qty: 90 TAB | Refills: 0 | Status: SHIPPED | OUTPATIENT
Start: 2017-09-29 | End: 2018-01-15 | Stop reason: SDUPTHER

## 2017-09-29 RX ORDER — CHLORTHALIDONE 50 MG/1
TABLET ORAL
Qty: 90 TAB | Refills: 0 | Status: SHIPPED | OUTPATIENT
Start: 2017-09-29 | End: 2018-01-15 | Stop reason: SDUPTHER

## 2017-09-29 RX ORDER — FENOFIBRATE 145 MG/1
TABLET, COATED ORAL
Qty: 90 TAB | Refills: 0 | Status: SHIPPED | OUTPATIENT
Start: 2017-09-29 | End: 2018-06-02 | Stop reason: SDUPTHER

## 2017-10-02 ENCOUNTER — HOSPITAL ENCOUNTER (OUTPATIENT)
Dept: GENERAL RADIOLOGY | Age: 67
Discharge: HOME | End: 2017-10-02
Payer: MEDICARE

## 2017-10-02 ENCOUNTER — OFFICE VISIT (OUTPATIENT)
Dept: INTERNAL MEDICINE CLINIC | Age: 67
End: 2017-10-02

## 2017-10-02 ENCOUNTER — DOCUMENTATION ONLY (OUTPATIENT)
Dept: INTERNAL MEDICINE CLINIC | Age: 67
End: 2017-10-02

## 2017-10-02 VITALS
RESPIRATION RATE: 16 BRPM | HEIGHT: 61 IN | HEART RATE: 73 BPM | WEIGHT: 180.8 LBS | DIASTOLIC BLOOD PRESSURE: 88 MMHG | OXYGEN SATURATION: 97 % | SYSTOLIC BLOOD PRESSURE: 152 MMHG | BODY MASS INDEX: 34.14 KG/M2 | TEMPERATURE: 97.7 F

## 2017-10-02 DIAGNOSIS — E03.9 ACQUIRED HYPOTHYROIDISM: ICD-10-CM

## 2017-10-02 DIAGNOSIS — R11.2 NON-INTRACTABLE VOMITING WITH NAUSEA, UNSPECIFIED VOMITING TYPE: ICD-10-CM

## 2017-10-02 DIAGNOSIS — R11.2 NON-INTRACTABLE VOMITING WITH NAUSEA, UNSPECIFIED VOMITING TYPE: Primary | ICD-10-CM

## 2017-10-02 DIAGNOSIS — R79.9 ABNORMAL BLOOD CHEMISTRY: ICD-10-CM

## 2017-10-02 PROCEDURE — 74020 XR ABD FLAT/ ERECT: CPT

## 2017-10-02 NOTE — PATIENT INSTRUCTIONS
It was a pleasure to see you! As discussed:      Nausea and Vomiting   I have ordered labs and xray to evaluate your symptoms  See Gastroenterology, referral given   If your pain/ discomfort  worsens or you develop fevers, chills, nausea, or vomiting  Return for urgent reevaluation. Seek immediate medical attention if your symptoms are severe. Nausea and Vomiting: Care Instructions  Your Care Instructions    When you are nauseated, you may feel weak and sweaty and notice a lot of saliva in your mouth. Nausea often leads to vomiting. Most of the time you do not need to worry about nausea and vomiting, but they can be signs of other illnesses. Two common causes of nausea and vomiting are stomach flu and food poisoning. Nausea and vomiting from viral stomach flu will usually start to improve within 24 hours. Nausea and vomiting from food poisoning may last from 12 to 48 hours. The doctor has checked you carefully, but problems can develop later. If you notice any problems or new symptoms, get medical treatment right away. Follow-up care is a key part of your treatment and safety. Be sure to make and go to all appointments, and call your doctor if you are having problems. It's also a good idea to know your test results and keep a list of the medicines you take. How can you care for yourself at home? · To prevent dehydration, drink plenty of fluids, enough so that your urine is light yellow or clear like water. Choose water and other caffeine-free clear liquids until you feel better. If you have kidney, heart, or liver disease and have to limit fluids, talk with your doctor before you increase the amount of fluids you drink. · Rest in bed until you feel better. · When you are able to eat, try clear soups, mild foods, and liquids until all symptoms are gone for 12 to 48 hours. Other good choices include dry toast, crackers, cooked cereal, and gelatin dessert, such as Jell-O.   When should you call for help?  Call 911 anytime you think you may need emergency care. For example, call if:  · You passed out (lost consciousness). Call your doctor now or seek immediate medical care if:  · You have symptoms of dehydration, such as:  ¨ Dry eyes and a dry mouth. ¨ Passing only a little dark urine. ¨ Feeling thirstier than usual.  · You have new or worsening belly pain. · You have a new or higher fever. · You vomit blood or what looks like coffee grounds. Watch closely for changes in your health, and be sure to contact your doctor if:  · You have ongoing nausea and vomiting. · Your vomiting is getting worse. · Your vomiting lasts longer than 2 days. · You are not getting better as expected. Where can you learn more? Go to http://malathi-alana.info/. Enter 25 [id number] in the search box to learn more about \"Nausea and Vomiting: Care Instructions. \"  Current as of: March 20, 2017  Content Version: 11.3  © 6922-4953 [employer]. Care instructions adapted under license by [employer] (which disclaims liability or warranty for this information). If you have questions about a medical condition or this instruction, always ask your healthcare professional. [place] [address] any warranty or liability for your use of this information.

## 2017-10-02 NOTE — MR AVS SNAPSHOT
Visit Information Date & Time Provider Department Dept. Phone Encounter #  
 10/2/2017 10:45 AM Regine Geiger MD [facility] Internal Medicine [phone] [id number] Follow-up Instructions Return if symptoms worsen or fail to improve before gastroenterology appointment. Upcoming Health Maintenance Date Due INFLUENZA AGE 9 TO ADULT 8/1/2017 MEDICARE YEARLY EXAM 5/20/2018 GLAUCOMA SCREENING Q2Y 8/22/2018 BREAST CANCER SCRN MAMMOGRAM 11/1/2018 COLONOSCOPY 1/29/2021 DTaP/Tdap/Td series (2 - Td) 6/3/2023 Allergies as of 10/2/2017  Review Complete On: 10/2/2017 By: Regine Geiger MD  
  
 Severity Noted Reaction Type Reactions Sudafed [Pseudoephedrine Hcl] High 04/06/2011    Palpitations Codeine  02/25/2010    Unknown (comments) Darvocet A500 [Propoxyphene N-acetaminophen]  03/16/2004    Nausea and Vomiting Current Immunizations  Reviewed on 2/19/2016 Name Date Influenza Vaccine 12/16/2016, 10/1/2015, 11/1/2014 Pneumococcal Conjugate (PCV-13) 2/6/2016 Pneumococcal Polysaccharide (PPSV-23) 2/11/2017 Tdap 6/3/2013 Zoster Vaccine, Live 5/5/2016 Not reviewed this visit You Were Diagnosed With   
  
 Codes Comments Non-intractable vomiting with nausea, unspecified vomiting type    -  Primary ICD-10-CM: R11.2 ICD-9-CM: 787.01 Acquired hypothyroidism     ICD-10-CM: E03.9 ICD-9-CM: 597. 9 Abnormal blood chemistry     ICD-10-CM: R79.9 ICD-9-CM: 790.6 Vitals BP Pulse Temp Resp Height(growth percentile) Weight(growth percentile) 152/88 (BP 1 Location: Right arm, BP Patient Position: Sitting) 73 97.7 °F (36.5 °C) (Oral) 16 5' 1\" (1.549 m) 180 lb 12.8 oz (82 kg) SpO2 BMI OB Status Smoking Status 97% 34.16 kg/m2 Hysterectomy Never Smoker Vitals History BMI and BSA Data Body Mass Index Body Surface Area  
 34.16 kg/m 2 1.88 m 2 Preferred Pharmacy Pharmacy Name Phone [facility], [address] [place] [address],[facility] Floor [phone] Your Updated Medication List  
  
   
This list is accurate as of: 10/2/17 11:39 AM.  Always use your most recent med list.  
  
  
  
  
 ALLERGY RELIEF (CLEMASTINE) PO Take  by mouth. chlorthalidone 50 mg tablet Commonly known as:  HYGROTEN  
TAKE ONE TABLET BY MOUTH ONCE DAILY **REPLACES  THE  25  MG  DOSE**  
  
 conjugated estrogens 0.625 mg tablet Commonly known as:  PREMARIN Take 1 Tab by mouth daily. DAILY VITAMINS PO Take 1 Tab by mouth daily. fenofibrate nanocrystallized 145 mg tablet Commonly known as:  TRICOR  
TAKE ONE TABLET BY MOUTH ONCE DAILY Glucosamine &Chondroit-MV-Min3 759-296-03-0.5 mg Tab Take  by mouth.  
  
 levothyroxine 112 mcg tablet Commonly known as:  SYNTHROID  
TAKE ONE TABLET BY MOUTH ONCE DAILY BEFORE BREAKFAST  
  
 metFORMIN 1,000 mg tablet Commonly known as:  GLUCOPHAGE  
TAKE ONE TABLET BY MOUTH ONCE DAILY  
  
 * NASACORT AQ 55 mcg nasal inhaler Generic drug:  triamcinolone 2 Sprays as needed. * triamcinolone acetonide 0.1 % topical cream  
Commonly known as:  KENALOG Apply  to affected area two (2) times a day. use thin layer OTHER Anton red * rOPINIRole 1 mg tablet Commonly known as:  [address] Take 1 tablet by mouth nightly * rOPINIRole 1 mg tablet Commonly known as:  [address] Take 1 tablet by mouth BID  
  
 VITAMIN D3 1,000 unit tablet Generic drug:  cholecalciferol Take 1,000 Units by mouth daily. vitamin e 1,000 unit capsule Commonly known as:  E GEMS Take 1,000 Units by mouth daily. * Notice: This list has 4 medication(s) that are the same as other medications prescribed for you. Read the directions carefully, and ask your doctor or other care provider to review them with you. We Performed the Following HEMOGLOBIN A1C WITH EAG [48447 CPT(R)] METABOLIC PANEL, COMPREHENSIVE [67060 CPT(R)] REFERRAL TO GASTROENTEROLOGY [XSK96 Custom] T4, FREE L007787 CPT(R)] TSH 3RD GENERATION [79633 CPT(R)] Follow-up Instructions Return if symptoms worsen or fail to improve before gastroenterology appointment. To-Do List   
 10/02/2017 Imaging:  XR ABD FLAT/ ERECT Referral Information Referral ID Referred By Referred To  
  
 [id number] Nava HERNANDEZ MD   
   [address] [place] [address] [address] [place], [address] Phone: [phone] Fax: [phone] Visits Status Start Date End Date 1 New Request 10/2/17 10/2/18 If your referral has a status of pending review or denied, additional information will be sent to support the outcome of this decision. Patient Instructions It was a pleasure to see you! As discussed: 
 
 
Nausea and Vomiting I have ordered labs and xray to evaluate your symptoms See Gastroenterology, referral given If your pain/ discomfort  worsens or you develop fevers, chills, nausea, or vomiting  Return for urgent reevaluation. Seek immediate medical attention if your symptoms are severe. Nausea and Vomiting: Care Instructions Your Care Instructions When you are nauseated, you may feel weak and sweaty and notice a lot of saliva in your mouth. Nausea often leads to vomiting. Most of the time you do not need to worry about nausea and vomiting, but they can be signs of other illnesses. Two common causes of nausea and vomiting are stomach flu and food poisoning. Nausea and vomiting from viral stomach flu will usually start to improve within 24 hours. Nausea and vomiting from food poisoning may last from 12 to 48 hours. The doctor has checked you carefully, but problems can develop later. If you notice any problems or new symptoms, get medical treatment right away. Follow-up care is a key part of your treatment and safety.  Be sure to make and go to all appointments, and call your doctor if you are having problems. It's also a good idea to know your test results and keep a list of the medicines you take. How can you care for yourself at home? · To prevent dehydration, drink plenty of fluids, enough so that your urine is light yellow or clear like water. Choose water and other caffeine-free clear liquids until you feel better. If you have kidney, heart, or liver disease and have to limit fluids, talk with your doctor before you increase the amount of fluids you drink. · Rest in bed until you feel better. · When you are able to eat, try clear soups, mild foods, and liquids until all symptoms are gone for 12 to 48 hours. Other good choices include dry toast, crackers, cooked cereal, and gelatin dessert, such as Jell-O. When should you call for help? Call 911 anytime you think you may need emergency care. For example, call if: 
· You passed out (lost consciousness). Call your doctor now or seek immediate medical care if: 
· You have symptoms of dehydration, such as: ¨ Dry eyes and a dry mouth. ¨ Passing only a little dark urine. ¨ Feeling thirstier than usual. 
· You have new or worsening belly pain. · You have a new or higher fever. · You vomit blood or what looks like coffee grounds. Watch closely for changes in your health, and be sure to contact your doctor if: 
· You have ongoing nausea and vomiting. · Your vomiting is getting worse. · Your vomiting lasts longer than 2 days. · You are not getting better as expected. Where can you learn more? Go to http://malathi-alana.info/. Enter 25 [id number] in the search box to learn more about \"Nausea and Vomiting: Care Instructions. \" Current as of: March 20, 2017 Content Version: 11.3 © 4351-3073 [employer].  Care instructions adapted under license by [employer] (which disclaims liability or warranty for this information). If you have questions about a medical condition or this instruction, always ask your healthcare professional. [address] any warranty or liability for your use of this information. Introducing [facility]! Dear Jenene Leyden: Thank you for requesting a Placeword account. Our records indicate that you already have an active Placeword account. You can access your account anytime at [email or web address] Did you know that you can access your hospital and ER discharge instructions at any time in Placeword? You can also review all of your test results from your hospital stay or ER visit. Additional Information If you have questions, please visit the Frequently Asked Questions section of the Placeword website at [email or web address] Remember, Placeword is NOT to be used for urgent needs. For medical emergencies, dial 911. Now available from your iPhone and Android! Please provide this summary of care documentation to your next provider. Your primary care clinician is listed as [phone] If you have any questions after today's visit, please call [phone].

## 2017-10-02 NOTE — PROGRESS NOTES
Chief Complaint   Patient presents with    Nausea     1. Have you been to the ER, urgent care clinic since your last visit? Hospitalized since your last visit? No    2. Have you seen or consulted any other health care providers outside of the [address] [place] [address] since your last visit? Include any pap smears or colon screening.  Yes When: 2-3 weeks ago Where: pain management Reason for visit: follow up 9/2017-back injection    Patient states last 2-3 week, feeling nauseated, vomiting    Cataract surgery right eye 12/13/2017

## 2017-10-02 NOTE — PROGRESS NOTES
HISTORY OF PRESENT ILLNESS  Gisel Rutherford is a 78 y.o. female. Nausea    This is a new problem. The current episode started more than 1 week ago (2 weeks ). The problem occurs continuously. The emesis has an appearance of stomach contents (vomits twice/ week ). There has been no fever (intermittently ). Associated symptoms include chills and diarrhea (chronic ). Pertinent negatives include no fever, no sweats and no abdominal pain. Associated symptoms comments: Decreased appetite   . The patient is no (complete hystrectomy )t pregnant. Risk factors: denies recent travel  Her past medical history is significant for irritable bowel syndrome. Her pertinent negatives include no recent abdominal surgery and no DM. Denies personal or family history of cancer. Colonoscopy UTD   Uses NSAIDS 3-4x/ week     Review of Systems   Constitutional: Positive for chills. Negative for fever. Gastrointestinal: Positive for constipation, diarrhea (chronic ) and nausea. Negative for abdominal pain, blood in stool and melena. Genitourinary: Negative for dysuria and urgency.      Patient Active Problem List    Diagnosis Date Noted    Neuropathic pain of both feet ([facility] [address] 11/09/2016    Mixed hyperlipidemia 08/19/2016    Advance directive discussed with patient 02/19/2016    Essential hypertension 03/01/2010    Acquired hypothyroidism 03/01/2010    Back pain 02/25/2010       Current Outpatient Prescriptions   Medication Sig Dispense Refill    levothyroxine (SYNTHROID) 112 mcg tablet TAKE ONE TABLET BY MOUTH ONCE DAILY BEFORE BREAKFAST 90 Tab 0    fenofibrate nanocrystallized (TRICOR) 145 mg tablet TAKE ONE TABLET BY MOUTH ONCE DAILY 90 Tab 0    chlorthalidone (HYGROTEN) 50 mg tablet TAKE ONE TABLET BY MOUTH ONCE DAILY **REPLACES  THE  25  MG  DOSE** 90 Tab 0    metFORMIN (GLUCOPHAGE) 1,000 mg tablet TAKE ONE TABLET BY MOUTH ONCE DAILY 90 Tab 0    rOPINIRole (REQUIP) 1 mg tablet Take 1 tablet by mouth BID 60 Tab 5    OTHER Anton red      rOPINIRole (REQUIP) 1 mg tablet Take 1 tablet by mouth nightly 90 Tab 1    conjugated estrogens (PREMARIN) 0.625 mg tablet Take 1 Tab by mouth daily. 30 Tab 5    CLEMASTINE FUMARATE (ALLERGY RELIEF, CLEMASTINE, PO) Take  by mouth.  triamcinolone acetonide (KENALOG) 0.1 % topical cream Apply  to affected area two (2) times a day. use thin layer 45 g 0    triamcinolone (NASACORT AQ) 55 mcg nasal inhaler 2 Sprays as needed.  cholecalciferol (VITAMIN D3) 1,000 unit tablet Take 1,000 Units by mouth daily.  vitamin e (E GEMS) 1,000 unit capsule Take 1,000 Units by mouth daily.  Glucosamine &Chondroit-MV-Min3 144-944-60-0.5 mg Tab Take  by mouth.  MULTIVITAMINS (DAILY VITAMINS PO) Take 1 Tab by mouth daily. Allergies   Allergen Reactions    Sudafed [Pseudoephedrine Hcl] Palpitations    Codeine Unknown (comments)    Darvocet A500 [Propoxyphene N-Acetaminophen] Nausea and Vomiting      Visit Vitals    /88 (BP 1 Location: Right arm, BP Patient Position: Sitting)    Pulse 73    Temp 97.7 °F (36.5 °C) (Oral)    Resp 16    Ht 5' 1\" (1.549 m)    Wt 180 lb 12.8 oz (82 kg)    SpO2 97%    BMI 34.16 kg/m2       Physical Exam   Constitutional: She is oriented to person, place, and time. No distress. Cardiovascular: Normal rate and regular rhythm. Pulmonary/Chest: Breath sounds normal. No respiratory distress. She has no wheezes. She has no rales. Abdominal: She exhibits distension. Bowel sounds are decreased. There is no tenderness. There is no rigidity, no rebound and no guarding. Neurological: She is alert and oriented to person, place, and time.      Lab Results  Component Value Date/Time   WBC 8.6 08/19/2016 10:29 AM   HGB 13.3 08/19/2016 10:29 AM   HCT 39.8 08/19/2016 10:29 AM   PLATELET 659 53/14/2730 10:29 AM   MCV 84 08/19/2016 10:29 AM     Lab Results  Component Value Date/Time   Hemoglobin A1c 6.3 10/10/2014 03:21 PM   Glucose 115 05/22/2017 07:42 AM   LDL, calculated 116 05/22/2017 07:42 AM   Creatinine 0.67 05/22/2017 07:42 AM      Lab Results  Component Value Date/Time   Cholesterol, total 208 05/22/2017 07:42 AM   HDL Cholesterol 62 05/22/2017 07:42 AM   LDL, calculated 116 05/22/2017 07:42 AM   Triglyceride 150 05/22/2017 07:42 AM   CHOL/HDL Ratio 4.2 10/01/2010 08:08 AM   Lab Results  Component Value Date/Time   ALT (SGPT) 15 05/22/2017 07:42 AM   AST (SGOT) 15 05/22/2017 07:42 AM   Alk. phosphatase 40 05/22/2017 07:42 AM   Bilirubin, total 0.2 05/22/2017 07:42 AM   Albumin 4.3 05/22/2017 07:42 AM   Protein, total 6.4 05/22/2017 07:42 AM   PLATELET 688 38/05/2582 10:29 AM       Lab Results  Component Value Date/Time   GFR est non-AA 92 05/22/2017 07:42 AM   GFR est  05/22/2017 07:42 AM   Creatinine 0.67 05/22/2017 07:42 AM   BUN 21 05/22/2017 07:42 AM   Sodium 142 05/22/2017 07:42 AM   Potassium 3.5 05/22/2017 07:42 AM   Chloride 99 05/22/2017 07:42 AM   CO2 23 05/22/2017 07:42 AM   Lab Results  Component Value Date/Time   TSH 3.950 05/22/2017 07:42 AM   T4, Free 1.42 05/22/2017 07:42 AM      Lab Results   Component Value Date/Time    Glucose 115 05/22/2017 07:42 AM          ASSESSMENT and PLAN  Diagnoses and all orders for this visit:    1. Non-intractable vomiting with nausea, unspecified vomiting typeMsAftab Rubio is a active 81-year-old woman with well history of well-controlled hypertension, hypothyroidism,and obesity who presents for evaluation of subacute nausea with intermittent vomiting but differential is broadHer vital signs are normal today. On exam she has no significant tenderness to palpation but is noted to have markedly hypoactive bowel sounds. Differential is broad ranging from constipation to gastroparesis. No emergent findings on exam. Stable for outpatient evaluation. Will start initial workup with labs to check for metabolic causes and an x-ray to assess for any sort of obstruction or blockage.   .  She has been scheduled with gastroenterology for October 4 for evaluation of her symptoms. In the interim she is to remain well-hydrated. ER red flags and precautions have been reviewed. -     REFERRAL TO GASTROENTEROLOGY  -     XR ABD FLAT/ ERECT; Future  -     TSH 3RD GENERATION  -     METABOLIC PANEL, COMPREHENSIVE  -     T4, FREE  -     HEMOGLOBIN A1C WITH EAG    2. Acquired hypothyroidism  -     TSH 3RD GENERATION  -     T4, FREE    3. Abnormal blood chemistry  -     HEMOGLOBIN A1C WITH EAG      Follow-up Disposition:  Return if symptoms worsen or fail to improve before gastroenterology appointment. Medication risks/benefits/costs/interactions/alternatives discussed with patient. Afsaneh Hopson  was given an after visit summary which includes diagnoses, current medications, & vitals. she expressed understanding with the diagnosis and plan.     20 minutes of 25 minutes of care coordination was spent counseling patient on treatment plan and assessing understanding

## 2017-10-02 NOTE — PROGRESS NOTES
Team V Please let Jonnathan Caldwell know (Angelo Lujan)   Hi Ms. Harper Perez,  It was a pleasure to see you today. Good news! Your abdominal x-ray does not show any bowel obstruction or significant constipation. It does have some nonspecific findings. Thus I recommend he see gastroenterology on Wednesday, October 4 as we discussed. In the interim if you develop any severe abdominal pain, diarrhea, fevers, chills, persistent nausea or any concerning symptoms please return immediately for evaluation or go to the ER if your symptoms are uncontrollable. Do not hesitate to contact the office if you have any questions or concerns before your next appointment.    Kind regards,   Dr. Davila Reap

## 2017-10-03 ENCOUNTER — TELEPHONE (OUTPATIENT)
Dept: INTERNAL MEDICINE CLINIC | Age: 67
End: 2017-10-03

## 2017-10-03 LAB
ALBUMIN SERPL-MCNC: 4.6 G/DL (ref 3.6–4.8)
ALBUMIN/GLOB SERPL: 1.8 {RATIO} (ref 1.2–2.2)
ALP SERPL-CCNC: 37 IU/L (ref 39–117)
ALT SERPL-CCNC: 19 IU/L (ref 0–32)
AST SERPL-CCNC: 21 IU/L (ref 0–40)
BILIRUB SERPL-MCNC: 0.3 MG/DL (ref 0–1.2)
BUN SERPL-MCNC: 24 MG/DL (ref 8–27)
BUN/CREAT SERPL: 30 (ref 12–28)
CALCIUM SERPL-MCNC: 10 MG/DL (ref 8.7–10.3)
CHLORIDE SERPL-SCNC: 98 MMOL/L (ref 96–106)
CO2 SERPL-SCNC: 27 MMOL/L (ref 18–29)
CREAT SERPL-MCNC: 0.81 MG/DL (ref 0.57–1)
EST. AVERAGE GLUCOSE BLD GHB EST-MCNC: 128 MG/DL
GLOBULIN SER CALC-MCNC: 2.5 G/DL (ref 1.5–4.5)
GLUCOSE SERPL-MCNC: 97 MG/DL (ref 65–99)
HBA1C MFR BLD: 6.1 % (ref 4.8–5.6)
POTASSIUM SERPL-SCNC: 3.6 MMOL/L (ref 3.5–5.2)
PROT SERPL-MCNC: 7.1 G/DL (ref 6–8.5)
SODIUM SERPL-SCNC: 144 MMOL/L (ref 134–144)
T4 FREE SERPL-MCNC: 1.59 NG/DL (ref 0.82–1.77)
TSH SERPL DL<=0.005 MIU/L-ACNC: 1.68 UIU/ML (ref 0.45–4.5)

## 2017-10-03 NOTE — PROGRESS NOTES
Patient has been informed per drs result notes and recommendations, pt will be following up with GI tomorrow as scheduled for further evaluation

## 2017-10-03 NOTE — TELEPHONE ENCOUNTER
Leanne Pulling from [address] [phone]     Fax# [phone]  Attn:  Leanne Pulling    States she never received the records she requested on 9/28. Please re-send if you sent them.

## 2017-10-10 ENCOUNTER — TELEPHONE (OUTPATIENT)
Dept: INTERNAL MEDICINE CLINIC | Age: 67
End: 2017-10-10

## 2017-10-10 RX ORDER — ONDANSETRON 4 MG/1
4 TABLET, FILM COATED ORAL 3 TIMES DAILY
COMMUNITY
End: 2018-05-22 | Stop reason: SDUPTHER

## 2017-10-10 RX ORDER — VITAMIN E 268 MG
400 CAPSULE ORAL DAILY
COMMUNITY

## 2017-10-10 RX ORDER — LORATADINE 10 MG/1
10 TABLET ORAL
COMMUNITY

## 2017-10-10 RX ORDER — ACETAMINOPHEN 500 MG
2000 TABLET ORAL DAILY
COMMUNITY
End: 2018-04-16 | Stop reason: SDUPTHER

## 2017-10-11 ENCOUNTER — HOSPITAL ENCOUNTER (OUTPATIENT)
Age: 67
Setting detail: OUTPATIENT SURGERY
Discharge: HOME | End: 2017-10-11
Attending: INTERNAL MEDICINE | Admitting: INTERNAL MEDICINE
Payer: MEDICARE

## 2017-10-11 ENCOUNTER — ANESTHESIA EVENT (OUTPATIENT)
Dept: ENDOSCOPY | Age: 67
End: 2017-10-11
Payer: MEDICARE

## 2017-10-11 ENCOUNTER — ANESTHESIA (OUTPATIENT)
Dept: ENDOSCOPY | Age: 67
End: 2017-10-11
Payer: MEDICARE

## 2017-10-11 VITALS
TEMPERATURE: 97.9 F | HEART RATE: 73 BPM | WEIGHT: 178.56 LBS | OXYGEN SATURATION: 99 % | DIASTOLIC BLOOD PRESSURE: 73 MMHG | SYSTOLIC BLOOD PRESSURE: 135 MMHG | HEIGHT: 61 IN | RESPIRATION RATE: 16 BRPM | BODY MASS INDEX: 33.71 KG/M2

## 2017-10-11 PROCEDURE — 76040000019: Performed by: INTERNAL MEDICINE

## 2017-10-11 PROCEDURE — 88305 TISSUE EXAM BY PATHOLOGIST: CPT | Performed by: INTERNAL MEDICINE

## 2017-10-11 PROCEDURE — 74011250636 HC RX REV CODE- 250/636

## 2017-10-11 PROCEDURE — 74011000250 HC RX REV CODE- 250

## 2017-10-11 PROCEDURE — 76060000031 HC ANESTHESIA FIRST 0.5 HR: Performed by: INTERNAL MEDICINE

## 2017-10-11 PROCEDURE — 77030009426 HC FCPS BIOP ENDOSC BSC -B: Performed by: INTERNAL MEDICINE

## 2017-10-11 PROCEDURE — 74011250636 HC RX REV CODE- 250/636: Performed by: INTERNAL MEDICINE

## 2017-10-11 RX ORDER — LIDOCAINE HYDROCHLORIDE 20 MG/ML
INJECTION, SOLUTION EPIDURAL; INFILTRATION; INTRACAUDAL; PERINEURAL AS NEEDED
Status: DISCONTINUED | OUTPATIENT
Start: 2017-10-11 | End: 2017-10-11 | Stop reason: HOSPADM

## 2017-10-11 RX ORDER — PROPOFOL 10 MG/ML
INJECTION, EMULSION INTRAVENOUS AS NEEDED
Status: DISCONTINUED | OUTPATIENT
Start: 2017-10-11 | End: 2017-10-11 | Stop reason: HOSPADM

## 2017-10-11 RX ORDER — OMEPRAZOLE 40 MG/1
40 CAPSULE, DELAYED RELEASE ORAL DAILY
Qty: 30 CAP | Refills: 5 | Status: SHIPPED | OUTPATIENT
Start: 2017-10-11 | End: 2017-12-04

## 2017-10-11 RX ORDER — SODIUM CHLORIDE 0.9 % (FLUSH) 0.9 %
5-10 SYRINGE (ML) INJECTION EVERY 8 HOURS
Status: DISCONTINUED | OUTPATIENT
Start: 2017-10-11 | End: 2017-10-11 | Stop reason: HOSPADM

## 2017-10-11 RX ORDER — SODIUM CHLORIDE 0.9 % (FLUSH) 0.9 %
5-10 SYRINGE (ML) INJECTION AS NEEDED
Status: DISCONTINUED | OUTPATIENT
Start: 2017-10-11 | End: 2017-10-11 | Stop reason: HOSPADM

## 2017-10-11 RX ORDER — SODIUM CHLORIDE 9 MG/ML
150 INJECTION, SOLUTION INTRAVENOUS CONTINUOUS
Status: DISCONTINUED | OUTPATIENT
Start: 2017-10-11 | End: 2017-10-11 | Stop reason: HOSPADM

## 2017-10-11 RX ORDER — SODIUM CHLORIDE 9 MG/ML
INJECTION, SOLUTION INTRAVENOUS
Status: DISCONTINUED | OUTPATIENT
Start: 2017-10-11 | End: 2017-10-11 | Stop reason: HOSPADM

## 2017-10-11 RX ORDER — DEXTROMETHORPHAN/PSEUDOEPHED 2.5-7.5/.8
1.2 DROPS ORAL
Status: DISCONTINUED | OUTPATIENT
Start: 2017-10-11 | End: 2017-10-11 | Stop reason: HOSPADM

## 2017-10-11 RX ORDER — ATROPINE SULFATE 0.1 MG/ML
0.5 INJECTION INTRAVENOUS
Status: DISCONTINUED | OUTPATIENT
Start: 2017-10-11 | End: 2017-10-11 | Stop reason: HOSPADM

## 2017-10-11 RX ORDER — MIDAZOLAM HYDROCHLORIDE 1 MG/ML
.25-5 INJECTION, SOLUTION INTRAMUSCULAR; INTRAVENOUS
Status: DISCONTINUED | OUTPATIENT
Start: 2017-10-11 | End: 2017-10-11 | Stop reason: HOSPADM

## 2017-10-11 RX ORDER — EPINEPHRINE 0.1 MG/ML
1 INJECTION INTRACARDIAC; INTRAVENOUS
Status: DISCONTINUED | OUTPATIENT
Start: 2017-10-11 | End: 2017-10-11 | Stop reason: HOSPADM

## 2017-10-11 RX ADMIN — PROPOFOL 30 MG: 10 INJECTION, EMULSION INTRAVENOUS at 14:11

## 2017-10-11 RX ADMIN — SODIUM CHLORIDE: 9 INJECTION, SOLUTION INTRAVENOUS at 14:04

## 2017-10-11 RX ADMIN — PROPOFOL 30 MG: 10 INJECTION, EMULSION INTRAVENOUS at 14:13

## 2017-10-11 RX ADMIN — LIDOCAINE HYDROCHLORIDE 80 MG: 20 INJECTION, SOLUTION EPIDURAL; INFILTRATION; INTRACAUDAL; PERINEURAL at 14:08

## 2017-10-11 RX ADMIN — PROPOFOL 50 MG: 10 INJECTION, EMULSION INTRAVENOUS at 14:08

## 2017-10-11 RX ADMIN — PROPOFOL 30 MG: 10 INJECTION, EMULSION INTRAVENOUS at 14:10

## 2017-10-11 RX ADMIN — PROPOFOL 50 MG: 10 INJECTION, EMULSION INTRAVENOUS at 14:09

## 2017-10-11 NOTE — DISCHARGE INSTRUCTIONS
Arian Bailey [address] Grande M.D.  [address] [place] [address]  [phone]          Ne Alyssia   [id number]  1950    DISCOMFORT:  Sore throat- throat lozenges or warm salt water gargle  Redness at IV site- apply warm compress to area; if redness or soreness persist- contact your physician  Gaseous discomfort- walking, belching will help relieve any discomfort  You may not operate a vehicle for 12 hours  You may not engage in an occupation involving machinery or appliances for the  rest of today  You may not drink alcoholic beverages for at least 12 hours  Avoid making any critical decisions for at least 24 hours    DIET:   You may resume your normal diet, but some patients find that heavy or large  meals may lead to indigestion or vomiting. I suggest a light meal as first food  intake. ACTIVITY:  You may resume your normal daily activities. It is recommended that you spend the remainder of the day resting - avoid any strenuous activity. CALL M.D. IF ANY SIGN OF:   Increasing pain, nausea, vomiting  Abdominal distension (swelling)  Significant bleeding (oral or rectal)  Fever   Pain in chest area  Shortness of breath    Additional Instructions:   Call Dr. Eddy Grande if any questions or problems at [phone]   You should receive the biopsy results by phone or mail within 2 weeks, if not, call my office for the results. EGD with mild gastritis. PPI trial. Await biopsy results.

## 2017-10-11 NOTE — ROUTINE PROCESS
Lashell Cardona  1950  [id number]    Situation:  Verbal report received from: Arun Vázquez RN  Procedure: Procedure(s):  ESOPHAGOGASTRODUODENOSCOPY (EGD)  ESOPHAGOGASTRODUODENAL (EGD) BIOPSY    Background:    Preoperative diagnosis: NAUSEA VOMITING   Postoperative diagnosis: gastritis    :  Dr. Joelle Jean  Assistant(s): Endoscopy Technician-1: Aldo Oneil IV  Endoscopy RN-1: Vance Stephens RN    Specimens:   ID Type Source Tests Collected by Time Destination   1 : pathology Preservative Duodenum  Rosina Cordova MD 10/11/2017 1411 Pathology   2 : pathology Preservative Gastric  Rosina Cordova MD 10/11/2017 1411 Pathology     H. Pylori  no    Assessment:  Intra-procedure medications         Anesthesia gave intra-procedure sedation and medications, see anesthesia flow sheet yes    Intravenous fluids: NS@ KVO     Vital signs stable     Abdominal assessment: round and soft     Recommendation:  Discharge patient per MD order  Family or Friend   Permission to share finding with family or friend yes

## 2017-10-11 NOTE — H&P
[address]  Jessica Patel, [address]          Pre-procedure History and Physical       NAME:  Yudi Hernadez   :   1950   MRN:   [id number]     CHIEF COMPLAINT/HPI: See procedure note    PMH:  Past Medical History:   Diagnosis Date    Cancer ([address]     skin    Chronic pain     back - PT    Diabetes [facility] [address]     Hypercholesterolemia     Hypertension     Ill-defined condition     N/V    Ill-defined condition     right eye scheduled for cataract removed 2017    Thyroid disease        PSH:  Past Surgical History:   Procedure Laterality Date    BREAST SURGERY PROCEDURE UNLISTED  ,3/2007,     breast biopsy - all benign    HX CATARACT REMOVAL Left     HX COLONOSCOPY  16    Dr. Forrest Lilly GYN       x 2, hysterectomy    HX MALIGNANT SKIN LESION EXCISION      skin cancer from nose - BCCA per pt       Allergies: Allergies   Allergen Reactions    Sudafed [Pseudoephedrine Hcl] Palpitations    Codeine Other (comments)     Hallucination/room spinning    Darvocet A500 [Propoxyphene N-Acetaminophen] Nausea and Vomiting       Home Medications:  Prior to Admission Medications   Prescriptions Last Dose Informant Patient Reported? Taking? Cholecalciferol, Vitamin D3, (VITAMIN D3) 2,000 unit cap capsule 10/11/2017 at Unknown time  Yes Yes   Sig: Take 2,000 Units by mouth daily. GLUCOSAMINE/CHONDROITIN SULF A (GLUCOSAMINE-CHONDROITIN PO) 10/11/2017 at Unknown time  Yes Yes   Sig: Take  by mouth. 1500mg/1200mg per 2 pills. Takes one pill once daily. KRILL/OM-3/DHA/EPA/PHOSPHO/AST (MEGARED OMEGA-3 KRILL OIL PO) 10/11/2017 at Unknown time  Yes Yes   Sig: Take 500 mg by mouth daily.    chlorthalidone (HYGROTEN) 50 mg tablet 10/11/2017 at Unknown time  No Yes   Sig: TAKE ONE TABLET BY MOUTH ONCE DAILY **REPLACES  THE  25  MG  DOSE**   conjugated estrogens (PREMARIN) 0.625 mg tablet 10/10/2017 at Unknown time  Yes Yes   Sig: Take 0.625 mg by mouth nightly. fenofibrate nanocrystallized (TRICOR) 145 mg tablet 10/11/2017 at Unknown time  No Yes   Sig: TAKE ONE TABLET BY MOUTH ONCE DAILY   levothyroxine (SYNTHROID) 112 mcg tablet 10/11/2017 at Unknown time  No Yes   Sig: TAKE ONE TABLET BY MOUTH ONCE DAILY BEFORE BREAKFAST   loratadine (ALLERCLEAR) 10 mg tablet 10/11/2017 at Unknown time  Yes Yes   Sig: Take 10 mg by mouth daily as needed for Allergies. metFORMIN (GLUCOPHAGE) 1,000 mg tablet 10/11/2017 at Unknown time  No Yes   Sig: TAKE ONE TABLET BY MOUTH ONCE DAILY   ondansetron hcl (ZOFRAN) 4 mg tablet 10/11/2017 at Unknown time  Yes Yes   Sig: Take 4 mg by mouth three (3) times daily. With food. rOPINIRole (REQUIP) 1 mg tablet 10/11/2017 at Unknown time  No Yes   Sig: Take 1 tablet by mouth BID   vitamin E (AQUA GEMS) 400 unit capsule 10/11/2017 at Unknown time  Yes Yes   Sig: Take 400 Units by mouth daily.       Facility-Administered Medications: None       Hospital Medications:  Current Facility-Administered Medications   Medication Dose Route Frequency    0.9% sodium chloride infusion  150 mL/hr IntraVENous CONTINUOUS    sodium chloride (NS) flush 5-10 mL  5-10 mL IntraVENous Q8H    sodium chloride (NS) flush 5-10 mL  5-10 mL IntraVENous PRN    midazolam (VERSED) injection 0.25-5 mg  0.25-5 mg IntraVENous Multiple    simethicone (MYLICON) 31VH/9.4YD oral drops 80 mg  1.2 mL Oral Multiple    atropine injection 0.5 mg  0.5 mg IntraVENous ONCE PRN    EPINEPHrine (ADRENALIN) 0.1 mg/mL syringe 1 mg  1 mg Endoscopically ONCE PRN       Family History:  Family History   Problem Relation Age of Onset    Heart Disease Father      CAD, CABG    Diabetes Father      insulin    Hypertension Mother     Cancer Mother      nose - skin - BCCA    Hypertension Brother     Hypertension Brother     Other Brother      PVD    Kidney Disease Brother      dialysis    Diabetes Brother      insulin       Social History:  Social History   Substance Use Topics    Smoking status: Never Smoker    Smokeless tobacco: Never Used    Alcohol use Yes      Comment: maybe once a year - has a sip         PHYSICAL EXAM PRIOR TO SEDATION:  General: Alert, in no acute distress    Lungs:            CTA bilaterally  Heart:  Normal S1, S2    Abdomen: Soft, Non distended, Non tender. Normoactive bowel sounds. Assessment:   Stable for sedation administration.     Plan:   · Endoscopic procedure with sedation     Signed By: Roselyn Reich MD     10/11/2017  2:07 PM

## 2017-10-11 NOTE — IP AVS SNAPSHOT
[address] 
[phone] Patient: Lashell Cardona MRN: [id number]:[id number] You are allergic to the following Allergen Reactions Sudafed (Pseudoephedrine Hcl) Palpitations Codeine Other (comments) Hallucination/room spinning Darvocet A500 (Propoxyphene N-Acetaminophen) Nausea and Vomiting Recent Documentation Height Weight Breastfeeding? BMI OB Status Smoking Status 1.549 m 81 kg No 33.74 kg/m2 Hysterectomy Never Smoker Emergency Contacts Name Discharge Info Relation Home Work Mobile Ganesh Law DISCHARGE CAREGIVER [3] Spouse [3] [phone] Shonna Hurtado  Spouse [3] [phone] About your hospitalization You were admitted on:  October 11, 2017 [facility] You were discharged on:  October 11, 2017 Unit phone number:  [phone] Why you were hospitalized Your primary diagnosis was:  Not on File Providers Seen During Your Hospitalizations Provider Role Specialty Primary office phone Rosina Cordova MD Attending Provider Gastroenterology [phone] Your Primary Care Physician (PCP) Primary Care Physician Office Phone Office Fax Marley Montoya [address], [facility] [phone] Follow-up Information None Current Discharge Medication List  
  
START taking these medications Dose & Instructions Dispensing Information Comments Morning Noon Evening Bedtime  
 omeprazole 40 mg capsule Commonly known as:  PRILOSEC Your last dose was: Your next dose is:    
   
   
 Dose:  40 mg Take 1 Cap by mouth daily. Quantity:  30 Cap Refills:  5 ASK your doctor about these medications Dose & Instructions Dispensing Information Comments Morning Noon Evening Bedtime ALLERCLEAR 10 mg tablet Generic drug:  loratadine Your last dose was: Your next dose is:    
   
   
 Dose:  10 mg Take 10 mg by mouth daily as needed for Allergies. Refills:  0  
     
   
   
   
  
 chlorthalidone 50 mg tablet Commonly known as:  Fanny Mend Your last dose was: Your next dose is: TAKE ONE TABLET BY MOUTH ONCE DAILY **REPLACES  THE  25  MG  DOSE** Quantity:  90 Tab Refills:  0 Cholecalciferol (Vitamin D3) 2,000 unit Cap capsule Commonly known as:  VITAMIN D3 Your last dose was: Your next dose is:    
   
   
 Dose:  2000 Units Take 2,000 Units by mouth daily. Refills:  0  
     
   
   
   
  
 fenofibrate nanocrystallized 145 mg tablet Commonly known as:  [employer] Your last dose was: Your next dose is: TAKE ONE TABLET BY MOUTH ONCE DAILY Quantity:  90 Tab Refills:  0 GLUCOSAMINE-CHONDROITIN PO Your last dose was: Your next dose is: Take  by mouth. 1500mg/1200mg per 2 pills. Takes one pill once daily. Refills:  0  
     
   
   
   
  
 levothyroxine 112 mcg tablet Commonly known as:  SYNTHROID Your last dose was: Your next dose is: TAKE ONE TABLET BY MOUTH ONCE DAILY BEFORE BREAKFAST Quantity:  90 Tab Refills:  0 MEGARED OMEGA-3 KRILL OIL PO Your last dose was: Your next dose is:    
   
   
 Dose:  500 mg Take 500 mg by mouth daily. Refills:  0  
     
   
   
   
  
 metFORMIN 1,000 mg tablet Commonly known as:  GLUCOPHAGE Your last dose was: Your next dose is: TAKE ONE TABLET BY MOUTH ONCE DAILY Quantity:  90 Tab Refills:  0  
     
   
   
   
  
 ondansetron hcl 4 mg tablet Commonly known as:  Art Even Your last dose was: Your next dose is:    
   
   
 Dose:  4 mg Take 4 mg by mouth three (3) times daily. With food. Refills:  0 PREMARIN 0.625 mg tablet Generic drug:  conjugated estrogens Your last dose was: Your next dose is:    
   
   
 Dose:  0.625 mg Take 0.625 mg by mouth nightly. Refills:  0  
     
   
   
   
  
 rOPINIRole 1 mg tablet Commonly known as:  Madie Govea Your last dose was: Your next dose is: Take 1 tablet by mouth BID Quantity:  60 Tab Refills:  5  
     
   
   
   
  
 vitamin E 400 unit capsule Commonly known as:  [address] Your last dose was: Your next dose is:    
   
   
 Dose:  400 Units Take 400 Units by mouth daily. Refills:  0 Where to Get Your Medications Information on where to get these meds will be given to you by the nurse or doctor. ! Ask your nurse or doctor about these medications  
  omeprazole 40 mg capsule Discharge Instructions [facility]. Vaibhav Evans M.D. 
[address] [place] [address] 
[phone] EGD DISCHARGE INSTRUCTIONS Jonnathan Caldwell 
[id number] 1950 DISCOMFORT: 
Sore throat- throat lozenges or warm salt water gargle Redness at IV site- apply warm compress to area; if redness or soreness persist- contact your physician Gaseous discomfort- walking, belching will help relieve any discomfort You may not operate a vehicle for 12 hours You may not engage in an occupation involving machinery or appliances for the  rest of today You may not drink alcoholic beverages for at least 12 hours Avoid making any critical decisions for at least 24 hours DIET: 
 You may resume your normal diet, but some patients find that heavy or large  meals may lead to indigestion or vomiting. I suggest a light meal as first food  intake. ACTIVITY: 
You may resume your normal daily activities.   It is recommended that you spend the remainder of the day resting - avoid any strenuous activity. CALL GRETCHEN Chavez ANY SIGN OF: Increasing pain, nausea, vomiting Abdominal distension (swelling) Significant bleeding (oral or rectal) Fever Pain in chest area Shortness of breath Additional Instructions: 
 Call Dr. Janett Batres if any questions or problems at [phone] You should receive the biopsy results by phone or mail within 2 weeks, if not, call my office for the results. EGD with mild gastritis. PPI trial. Await biopsy results. Discharge Orders None ACO Transitions of Care Introducing [employer] [address] Natalie Kolb offers a voluntary care coordination program to provide high quality service and care to [facility] fee-for-service beneficiaries. Mahad Adrian was designed to help you enhance your health and well-being through the following services: ? Transitions of Care  support for individuals who are transitioning from one care setting to another (example: Hospital to home). ? Chronic and Complex Care Coordination  support for individuals and caregivers of those with serious or chronic illnesses or with more than one chronic (ongoing) condition and those who take a number of different medications. If you meet specific medical criteria, a [address] [place] [address] Rd may call you directly to coordinate your care with your primary care physician and your other care providers. For questions about the [facility] programs, please, contact your physicians office. For general questions or additional information about Accountable Care Organizations: 
Please visit www.medicare.gov/acos. html or call 1-800-MEDICARE (1-577.177.5785) TTY users should call 4-894.127.1960. Introducing [facility] & HEALTH SERVICES! Dear Yamilet Thompson: Thank you for requesting a [employer] account.   Our records indicate that you already have an active [employer] account. You can access your account anytime at [email or web address] Did you know that you can access your hospital and ER discharge instructions at any time in [employer]? You can also review all of your test results from your hospital stay or ER visit. Additional Information If you have questions, please visit the Frequently Asked Questions section of the [employer] website at [email or web address] Remember, [employer] is NOT to be used for urgent needs. For medical emergencies, dial 911. Now available from your iPhone and Android! General Information Please provide this summary of care documentation to your next provider. Patient Signature:  ____________________________________________________________ Date:  ____________________________________________________________  
  
Johnny Police Provider Signature:  ____________________________________________________________ Date:  ____________________________________________________________

## 2017-10-11 NOTE — PROCEDURES
Arian Bailey [address] GRETCHEN Rolle [address], [address] [facility]  [phone]               Esophagogastroduodenoscopy (EGD) Procedure Note    NAME: Vinay Castillo  :  1950  MRN:  [id number]    Indications:  Vomiting, persitent of unclear etilogy     : Joseline Laureano MD    Referring Provider:  Hussein Curtis MD    Medicine:  SAVANAH MICHAEL [facility] anesthesia      Procedure Details:  After informed consent was obtained with all risks and benefits of the procedure explained and preprocedure exam completed, the patient was placed in the left lateral decubitus position. Universal protocol for patient identification was performed and documented in the nursing notes. Throughout the procedure, the patient's blood pressure was monitored at least every five minutes; pulse, and oxygen saturations were monitored continuously. All vital signs were documented in the nursing notes. The endoscope was inserted into the mouth and advanced under direct vision to second portion of the duodenum. A careful inspection was made as the gastroscope was withdrawn, including a retroflexed view of the proximal stomach; findings and interventions are described below. Findings:   Esophagus:normal  Stomach: mild diffuse patchy erythema s/p biopsies  Duodenum: normal s/p biopsies for celiac disease    Interventions:    biopsy of the stomach and duodenum    Specimens:     ID Type Source Tests Collected by Time Destination   1 : pathology Preservative Duodenum  Joseline Laureano MD 10/11/2017 1411 Pathology   2 : pathology Preservative Gastric  Joseline Laureano MD 10/11/2017 1411 Pathology        EBL: None          Complications:     No immediate complications        Impression:  -As above. Recommendations:  -Await pathology. -PPI trial  -Next step to consider is GES    Signed by:  Joseline Laureano MD         10/11/2017 2:24 PM

## 2017-10-11 NOTE — ANESTHESIA POSTPROCEDURE EVALUATION
Post-Anesthesia Evaluation and Assessment    Patient: Amada Bautista MRN: [id number]  SSN: [id number]    YOB: 1950  Age: 78 y.o. Sex: female       Cardiovascular Function/Vital Signs  Visit Vitals    /74    Pulse 70    Temp 36.6 °C (97.9 °F)    Resp 18    Ht 5' 1\" (1.549 m)    Wt 81 kg (178 lb 9 oz)    SpO2 97%    Breastfeeding No    BMI 33.74 kg/m2       Patient is status post MAC anesthesia for Procedure(s):  ESOPHAGOGASTRODUODENOSCOPY (EGD)  ESOPHAGOGASTRODUODENAL (EGD) BIOPSY. Nausea/Vomiting: None    Postoperative hydration reviewed and adequate. Pain:  Pain Scale 1: Numeric (0 - 10) (10/11/17 1435)  Pain Intensity 1: 0 (10/11/17 1435)   Managed    Neurological Status: At baseline    Mental Status and Level of Consciousness: Arousable    Pulmonary Status:   O2 Device: Room air (10/11/17 1435)   Adequate oxygenation and airway patent    Complications related to anesthesia: None    Post-anesthesia assessment completed.  No concerns    Signed By: Amber Choi MD     October 11, 2017

## 2017-10-11 NOTE — ANESTHESIA PREPROCEDURE EVALUATION
Anesthetic History               Review of Systems / Medical History  Patient summary reviewed, nursing notes reviewed and pertinent labs reviewed    Pulmonary  Within defined limits                 Neuro/Psych   Within defined limits           Cardiovascular    Hypertension              Exercise tolerance: >4 METS     GI/Hepatic/Renal               Comments: Nausea/vomiting Endo/Other    Diabetes  Hypothyroidism       Other Findings            Physical Exam    Airway  Mallampati: I  TM Distance: > 6 cm  Neck ROM: normal range of motion   Mouth opening: Normal     Cardiovascular    Rhythm: regular  Rate: normal         Dental  No notable dental hx       Pulmonary  Breath sounds clear to auscultation               Abdominal         Other Findings            Anesthetic Plan    ASA: 2  Anesthesia type: MAC          Induction: Intravenous  Anesthetic plan and risks discussed with: Patient

## 2017-10-28 NOTE — PROGRESS NOTES
Lu[address]   Your labs are clinically stable. No medication changes are needed  Some labs that may have been tested and their explanation are:    Your electrolytes, kidney & liver function (Metabolic Panel)   Anemia, blood cells (CBC)  Thyroid (TSH + T4, T3)  Hormones (prolactin, vitamin D )   Pregnancy (Beta HCG)    Diabetes (Hemoglobin A1c) - Your prediabetes has improved. PSA (Prostate Health)    Do not hesitate to contact the office if you have any questions or concerns before your next appointment.    Kind regards,  Dr. Anette Vogel

## 2017-10-31 ENCOUNTER — HOSPITAL ENCOUNTER (OUTPATIENT)
Dept: NUCLEAR MEDICINE | Age: 67
Discharge: HOME | End: 2017-10-31
Attending: INTERNAL MEDICINE
Payer: MEDICARE

## 2017-10-31 DIAGNOSIS — R11.2 NAUSEA AND VOMITING: ICD-10-CM

## 2017-10-31 PROCEDURE — 78264 GASTRIC EMPTYING IMG STUDY: CPT

## 2017-11-07 ENCOUNTER — HOSPITAL ENCOUNTER (OUTPATIENT)
Dept: ULTRASOUND IMAGING | Age: 67
Discharge: HOME | End: 2017-11-07
Attending: INTERNAL MEDICINE
Payer: MEDICARE

## 2017-11-07 DIAGNOSIS — R11.2 NAUSEA AND VOMITING: ICD-10-CM

## 2017-11-07 PROCEDURE — 76705 ECHO EXAM OF ABDOMEN: CPT

## 2017-11-09 RX ORDER — METFORMIN HYDROCHLORIDE 1000 MG/1
TABLET ORAL
Qty: 90 TAB | Refills: 0 | Status: SHIPPED | OUTPATIENT
Start: 2017-11-09 | End: 2018-02-27 | Stop reason: SDUPTHER

## 2017-11-13 ENCOUNTER — TELEPHONE (OUTPATIENT)
Dept: INTERNAL MEDICINE CLINIC | Age: 67
End: 2017-11-13

## 2017-11-13 ENCOUNTER — OFFICE VISIT (OUTPATIENT)
Dept: SURGERY | Age: 67
End: 2017-11-13

## 2017-11-13 VITALS
RESPIRATION RATE: 20 BRPM | DIASTOLIC BLOOD PRESSURE: 88 MMHG | BODY MASS INDEX: 33 KG/M2 | OXYGEN SATURATION: 93 % | WEIGHT: 174.8 LBS | SYSTOLIC BLOOD PRESSURE: 140 MMHG | HEART RATE: 88 BPM | HEIGHT: 61 IN | TEMPERATURE: 98.6 F

## 2017-11-13 DIAGNOSIS — K80.20 CALCULUS OF GALLBLADDER WITHOUT CHOLECYSTITIS WITHOUT OBSTRUCTION: ICD-10-CM

## 2017-11-13 DIAGNOSIS — R11.15 INTRACTABLE CYCLICAL VOMITING WITH NAUSEA: ICD-10-CM

## 2017-11-13 DIAGNOSIS — R10.9 RIGHT FLANK PAIN: Primary | ICD-10-CM

## 2017-11-13 RX ORDER — ESTROGENS, CONJUGATED 0.62 MG/1
TABLET, FILM COATED ORAL
Qty: 30 TAB | Refills: 5 | Status: SHIPPED | OUTPATIENT
Start: 2017-11-13 | End: 2018-05-18 | Stop reason: SDUPTHER

## 2017-11-13 NOTE — TELEPHONE ENCOUNTER
Met with Dr. Aura Batista this morning -- has ordered a hyda-scan to make sure it really is her gallbladder. She wants to thank [facility] for all her help.

## 2017-11-13 NOTE — MR AVS SNAPSHOT
Visit Information Date & Time Provider Department Dept. Phone Encounter #  
 11/13/2017  3:20 PM Leeann Miner MD [facility] 22 708 [phone] [id number] Upcoming Health Maintenance Date Due  
 MEDICARE YEARLY EXAM 5/20/2018 GLAUCOMA SCREENING Q2Y 8/22/2018 BREAST CANCER SCRN MAMMOGRAM 11/1/2019 COLONOSCOPY 1/29/2021 DTaP/Tdap/Td series (2 - Td) 6/3/2023 Allergies as of 11/13/2017  Review Complete On: 11/13/2017 By: Leeann Miner MD  
  
 Severity Noted Reaction Type Reactions Sudafed [Pseudoephedrine Hcl] High 04/06/2011    Palpitations Codeine  02/25/2010    Other (comments) Hallucination/room spinning Darvocet A500 [Propoxyphene N-acetaminophen]  03/16/2004    Nausea and Vomiting Current Immunizations  Reviewed on 10/2/2017 Name Date Influenza High Dose Vaccine PF 9/30/2017 Influenza Vaccine 12/16/2016, 10/1/2015, 11/1/2014 Pneumococcal Conjugate (PCV-13) 2/6/2016 Pneumococcal Polysaccharide (PPSV-23) 2/11/2017 Tdap 6/3/2013 Zoster Vaccine, Live 5/5/2016 Not reviewed this visit You Were Diagnosed With   
  
 Codes Comments Right flank pain    -  Primary ICD-10-CM: R10.9 ICD-9-CM: 789.09 Intractable cyclical vomiting with nausea     ICD-10-CM: G43. A1 
ICD-9-CM: 536.2 Calculus of gallbladder without cholecystitis without obstruction     ICD-10-CM: K80.20 ICD-9-CM: 574.20 Vitals BP Pulse Temp Resp Height(growth percentile) Weight(growth percentile) 140/88 88 98.6 °F (37 °C) (Oral) 20 5' 1\" (1.549 m) 174 lb 12.8 oz (79.3 kg) SpO2 BMI OB Status Smoking Status 93% 33.03 kg/m2 Hysterectomy Never Smoker Vitals History BMI and BSA Data Body Mass Index Body Surface Area 33.03 kg/m 2 1.85 m 2 Preferred Pharmacy Pharmacy Name Phone [facility] [address] [facility] [place], [address] [facility] [phone] Your Updated Medication List  
  
   
This list is accurate as of: 11/13/17  4:11 PM.  Always use your most recent med list.  
  
  
  
  
 ALLERCLEAR 10 mg tablet Generic drug:  loratadine Take 10 mg by mouth daily as needed for Allergies. chlorthalidone 50 mg tablet Commonly known as:  HYGROTEN  
TAKE ONE TABLET BY MOUTH ONCE DAILY **REPLACES  THE  25  MG  DOSE** Cholecalciferol (Vitamin D3) 2,000 unit Cap capsule Commonly known as:  VITAMIN D3 Take 2,000 Units by mouth daily. fenofibrate nanocrystallized 145 mg tablet Commonly known as:  TRICOR  
TAKE ONE TABLET BY MOUTH ONCE DAILY  
  
 GLUCOSAMINE-CHONDROITIN PO Take  by mouth. 1500mg/1200mg per 2 pills. Takes one pill once daily. levothyroxine 112 mcg tablet Commonly known as:  SYNTHROID  
TAKE ONE TABLET BY MOUTH ONCE DAILY BEFORE BREAKFAST MEGARED OMEGA-3 KRILL OIL PO Take 500 mg by mouth daily. metFORMIN 1,000 mg tablet Commonly known as:  GLUCOPHAGE  
TAKE ONE TABLET BY MOUTH ONCE DAILY  
  
 omeprazole 40 mg capsule Commonly known as:  PRILOSEC Take 1 Cap by mouth daily. ondansetron hcl 4 mg tablet Commonly known as:  Margurette Ran Take 4 mg by mouth three (3) times daily. With food. * PREMARIN 0.625 mg tablet Generic drug:  conjugated estrogens Take 0.625 mg by mouth nightly. * PREMARIN 0.625 mg tablet Generic drug:  conjugated estrogens TAKE ONE TABLET BY MOUTH ONCE DAILY  
  
 rOPINIRole 1 mg tablet Commonly known as:  Daun Dire Take 1 tablet by mouth BID  
  
 vitamin E 400 unit capsule Commonly known as:  [address] Take 400 Units by mouth daily. * Notice: This list has 2 medication(s) that are the same as other medications prescribed for you. Read the directions carefully, and ask your doctor or other care provider to review them with you. To-Do List   
 11/13/2017 Imaging:  NM HEPATOBILIARY DUCT SCAN Introducing [facility] & HEALTH SERVICES! Dear Ale Saldana: Thank you for requesting a [employer] account. Our records indicate that you already have an active [employer] account. You can access your account anytime at [email or web address] Did you know that you can access your hospital and ER discharge instructions at any time in [employer]? You can also review all of your test results from your hospital stay or ER visit. Additional Information If you have questions, please visit the Frequently Asked Questions section of the [employer] website at [email or web address] Remember, [employer] is NOT to be used for urgent needs. For medical emergencies, dial 911. Now available from your iPhone and Android! Please provide this summary of care documentation to your next provider. Your primary care clinician is listed as [phone] If you have any questions after today's visit, please call [phone].

## 2017-11-13 NOTE — TELEPHONE ENCOUNTER
Patient states Dr. Ratna Cruz told her to call him this morning, that \"he would see what he could do\" as far as getting her in with Dr. Don Apodaca before the 21st.    Please let her know.

## 2017-11-13 NOTE — TELEPHONE ENCOUNTER
Pt. States she is having daily RUQ pain that worsens at night, feels like a bad bruise, is taking nausea medication twice daily and vomiting has stopped. Dr Elizabeth Fine requested an earlier appt. For known gallstones and Pt. Notified she now has an appt. Today 3:20p with Dr Jorge Oden [phone] [facility] .

## 2017-11-13 NOTE — PROGRESS NOTES
Surgery Consultation    Subjective:     Nannette Rutherford is a 78 y.o. female with a history of abdominal pain. She complains of right flank pain radiating around to abdomen. The pain first began 7 weeks ago which would first start in the morning, but not related to eating, and would persist throughout the day. Patient notes that the pain is more severe at night. She has had nausea and vomiting. She notes that she was placed on Zofran, which alleviated the vomiting, but continues to experience nausea. Patient was also placed omeprazole by GI specialist, which did not alleviate symptoms. The patient  has not had jaundice, acholic stools or dark urine and has not had a history of pancreatitis or hepatitis. Ultrasound of the abdomen (17): IMPRESSION:  1. Cholelithiasis. Patient is tender over the right upper quadrant suggesting an  element of cholecystitis. There is no ductal dilatation.  The liver is diffusely  increased in echogenicity may represent hepatic steatosis      Pt is referred by Karolyn Oshea MD.    Patient Active Problem List    Diagnosis Date Noted    Neuropathic pain of both feet 2016    Mixed hyperlipidemia 2016    Advance directive discussed with patient 2016    Essential hypertension 2010    Acquired hypothyroidism 2010    Back pain 2010     Past Medical History:   Diagnosis Date    Cancer ([address]     skin    Chronic pain     back - PT    Diabetes ([address]     Hypercholesterolemia     Hypertension     Ill-defined condition     N/V    Ill-defined condition     right eye scheduled for cataract removed 2017    Thyroid disease       Past Surgical History:   Procedure Laterality Date    BREAST SURGERY PROCEDURE UNLISTED  ,3/2007,     breast biopsy - all benign    HX CATARACT REMOVAL Left     HX COLONOSCOPY  16    Dr. Kristen Perkins GYN       x 2, hysterectomy    HX MALIGNANT SKIN LESION EXCISION      skin cancer from nose - BCCA per pt      Social History   Substance Use Topics    Smoking status: Never Smoker    Smokeless tobacco: Never Used    Alcohol use Yes      Comment: maybe once a year - has a sip      Family History   Problem Relation Age of Onset    Heart Disease Father      CAD, CABG    Diabetes Father      insulin    Hypertension Mother     Cancer Mother      nose - skin - BCCA    Hypertension Brother     Hypertension Brother     Other Brother      PVD    Kidney Disease Brother      dialysis    Diabetes Brother      insulin      Current Outpatient Prescriptions   Medication Sig    PREMARIN 0.625 mg tablet TAKE ONE TABLET BY MOUTH ONCE DAILY    metFORMIN (GLUCOPHAGE) 1,000 mg tablet TAKE ONE TABLET BY MOUTH ONCE DAILY    omeprazole (PRILOSEC) 40 mg capsule Take 1 Cap by mouth daily.  conjugated estrogens (PREMARIN) 0.625 mg tablet Take 0.625 mg by mouth nightly.  ondansetron hcl (ZOFRAN) 4 mg tablet Take 4 mg by mouth three (3) times daily. With food.  vitamin E (AQUA GEMS) 400 unit capsule Take 400 Units by mouth daily.  Cholecalciferol, Vitamin D3, (VITAMIN D3) 2,000 unit cap capsule Take 2,000 Units by mouth daily.  KRILL/OM-3/DHA/EPA/PHOSPHO/AST (MEGARED OMEGA-3 KRILL OIL PO) Take 500 mg by mouth daily.  GLUCOSAMINE/CHONDROITIN SULF A (GLUCOSAMINE-CHONDROITIN PO) Take  by mouth. 1500mg/1200mg per 2 pills. Takes one pill once daily.  loratadine (ALLERCLEAR) 10 mg tablet Take 10 mg by mouth daily as needed for Allergies.  levothyroxine (SYNTHROID) 112 mcg tablet TAKE ONE TABLET BY MOUTH ONCE DAILY BEFORE BREAKFAST    fenofibrate nanocrystallized (TRICOR) 145 mg tablet TAKE ONE TABLET BY MOUTH ONCE DAILY    chlorthalidone (HYGROTEN) 50 mg tablet TAKE ONE TABLET BY MOUTH ONCE DAILY **REPLACES  THE  25  MG  DOSE**    rOPINIRole (REQUIP) 1 mg tablet Take 1 tablet by mouth BID     No current facility-administered medications for this visit.        Allergies   Allergen Reactions    Sudafed [Pseudoephedrine Hcl] Palpitations    Codeine Other (comments)     Hallucination/room spinning    Darvocet A500 [Propoxyphene N-Acetaminophen] Nausea and Vomiting        Review of Systems:  A comprehensive review of 12 systems was negative except for:   Cardiovascular: high blood pressure   Gastrointestinal: gallbladder problems, diarrhea, stomach pain, nausea   Musculoskeletal: back trouble         Objective:     Visit Vitals    /88    Pulse 88    Temp 98.6 °F (37 °C) (Oral)    Resp 20    Ht 5' 1\" (1.549 m)    Wt 174 lb 12.8 oz (79.3 kg)    SpO2 93%    BMI 33.03 kg/m2        Physical Exam:    General:  alert, cooperative, no distress, appears stated age   Eyes:  conjunctivae and sclerae normal, EOMs intact   Throat & Neck: no erythema or exudates noted and neck supple and symmetrical; no palpable masses   Lungs:   clear to auscultation bilaterally   Heart:  Regular rate and rhythm   Abdomen:   abdomen is soft and flat without significant masses, organomegaly or guarding, normal bowel sounds, no striae, dilated veins, rashes, or lesions  Tenderness to palpation in RLQ   Extremities: extremities normal, atraumatic, no edema   Skin: Normal.   Neuro: Mental status: Alert, oriented, thought content appropriate  Gait: Normal   Lymphatic: No supraclavicular adenopathy noted              Imaging and Lab Review:     No results found for this or any previous visit (from the past 24 hour(s)). images and reports reviewed    Assessment:   Diagnoses and all orders for this visit:    1. Right flank pain  -     NM HEPATOBILIARY DUCT SCAN; Future    2. Intractable cyclical vomiting with nausea  -     NM HEPATOBILIARY DUCT SCAN; Future    3. Calculus of gallbladder without cholecystitis without obstruction  -     NM HEPATOBILIARY DUCT SCAN; Future        Patient describes symptoms that are not consistent with typical presentations of biliary colic.  We will proceed with a HIDA scan to further evaluate for any gallbladder dysfunction. Recommendation:     1. Recommend the patient undergo a HIDA for further evaluation prior to considering gallbladder removal.     2. I explained the indications for laparoscopic cholecystectomy as well as the alternatives. I discussed the potential risks, including but not limited to bleeding, wound infection, trocar injuries and also the possible need for conversion to open procedure. She indicates that she understands the risks, accepts and wishes to proceed. 3. A patient education booklet on laparoscopic cholecystectomy was given to the patient. 4. Follow up following completion of radiological studies. If consistent with chronic cholecystitis will request auth for laparoscopic cholecystectomy. 3:33 PM - 3:58 PM   Total time spent with patient, greater than 50% of the time was spent in counselin minutes.       Signed By: Keyonna Pritchett MD    2017       Written by Joana Mcleod, as dictated by Keyonna Pritchett MD.       Cc: MD Jewel Rich MD

## 2017-11-13 NOTE — COMMUNICATION BODY
Assessment:   Diagnoses and all orders for this visit:    1. Right flank pain  -     NM HEPATOBILIARY DUCT SCAN; Future    2. Intractable cyclical vomiting with nausea  -     NM HEPATOBILIARY DUCT SCAN; Future    3. Calculus of gallbladder without cholecystitis without obstruction  -     NM HEPATOBILIARY DUCT SCAN; Future        Patient describes symptoms that are not consistent with typical presentations of biliary colic. We will proceed with a HIDA scan to further evaluate for any gallbladder dysfunction. Recommendation:     1. Recommend the patient undergo a HIDA for further evaluation prior to considering gallbladder removal.     2. I explained the indications for laparoscopic cholecystectomy as well as the alternatives. I discussed the potential risks, including but not limited to bleeding, wound infection, trocar injuries and also the possible need for conversion to open procedure. She indicates that she understands the risks, accepts and wishes to proceed. 3. A patient education booklet on laparoscopic cholecystectomy was given to the patient. 4. Follow up following completion of radiological studies. If consistent with chronic cholecystitis will request auth for laparoscopic cholecystectomy.

## 2017-11-13 NOTE — LETTER
11/13/2017 4:04 PM 
 
Patient:  Tomeka King YOB: 1950 Date of Visit: 11/13/2017 Dear Jennifer St MD 
[address] [place] [address] [address] [facility] [address] VIA Facsimile: [phone] 
 : Thank you for referring Ms. Melina Fry to me for evaluation/treatment. Below are the relevant portions of my assessment and plan of care. Assessment:  
Diagnoses and all orders for this visit: 1. Right flank pain 
-     NM HEPATOBILIARY DUCT SCAN; Future 2. Intractable cyclical vomiting with nausea 
-     NM HEPATOBILIARY DUCT SCAN; Future 3. Calculus of gallbladder without cholecystitis without obstruction 
-     NM HEPATOBILIARY DUCT SCAN; Future Patient describes symptoms that are not consistent with typical presentations of biliary colic. We will proceed with a HIDA scan to further evaluate for any gallbladder dysfunction. Recommendation: 1. Recommend the patient undergo a HIDA for further evaluation prior to considering gallbladder removal.  
 
2. I explained the indications for laparoscopic cholecystectomy as well as the alternatives. I discussed the potential risks, including but not limited to bleeding, wound infection, trocar injuries and also the possible need for conversion to open procedure. She indicates that she understands the risks, accepts and wishes to proceed. 3. A patient education booklet on laparoscopic cholecystectomy was given to the patient. 4. Follow up following completion of radiological studies. If consistent with chronic cholecystitis will request auth for laparoscopic cholecystectomy. If you have questions, please do not hesitate to call me. I look forward to following Ms. Radha Tyler along with you. Sincerely, Indio Tyler MD

## 2017-11-15 ENCOUNTER — HOSPITAL ENCOUNTER (OUTPATIENT)
Dept: NUCLEAR MEDICINE | Age: 67
Discharge: HOME | End: 2017-11-15
Attending: SURGERY
Payer: MEDICARE

## 2017-11-15 VITALS — WEIGHT: 173 LBS | BODY MASS INDEX: 32.69 KG/M2

## 2017-11-15 DIAGNOSIS — R11.15 INTRACTABLE CYCLICAL VOMITING WITH NAUSEA: ICD-10-CM

## 2017-11-15 DIAGNOSIS — R10.9 RIGHT FLANK PAIN: ICD-10-CM

## 2017-11-15 DIAGNOSIS — K80.20 CALCULUS OF GALLBLADDER WITHOUT CHOLECYSTITIS WITHOUT OBSTRUCTION: ICD-10-CM

## 2017-11-15 PROCEDURE — 74011250636 HC RX REV CODE- 250/636: Performed by: RADIOLOGY

## 2017-11-15 PROCEDURE — 78227 HEPATOBIL SYST IMAGE W/DRUG: CPT

## 2017-11-15 RX ORDER — MORPHINE SULFATE 10 MG/ML
2 INJECTION, SOLUTION INTRAMUSCULAR; INTRAVENOUS ONCE
Status: COMPLETED | OUTPATIENT
Start: 2017-11-15 | End: 2017-11-15

## 2017-11-15 RX ADMIN — MORPHINE SULFATE 2 MG: 10 INJECTION, SOLUTION INTRAMUSCULAR; INTRAVENOUS at 14:32

## 2017-11-16 ENCOUNTER — TELEPHONE (OUTPATIENT)
Dept: SURGERY | Age: 67
End: 2017-11-16

## 2017-11-16 NOTE — TELEPHONE ENCOUNTER
Spoke with patient who wants  to call regarding results of Verito Scan. Informed patient I will make Dr. Ronal Roblero aware.

## 2017-11-18 ENCOUNTER — ANESTHESIA EVENT (OUTPATIENT)
Dept: SURGERY | Age: 67
End: 2017-11-18
Payer: MEDICARE

## 2017-11-20 ENCOUNTER — ANESTHESIA (OUTPATIENT)
Dept: SURGERY | Age: 67
End: 2017-11-20
Payer: MEDICARE

## 2017-11-20 ENCOUNTER — HOSPITAL ENCOUNTER (OUTPATIENT)
Age: 67
Setting detail: OUTPATIENT SURGERY
Discharge: HOME | End: 2017-11-20
Attending: SURGERY | Admitting: SURGERY
Payer: MEDICARE

## 2017-11-20 VITALS
RESPIRATION RATE: 17 BRPM | WEIGHT: 168 LBS | TEMPERATURE: 97.7 F | BODY MASS INDEX: 31.72 KG/M2 | HEART RATE: 83 BPM | SYSTOLIC BLOOD PRESSURE: 152 MMHG | OXYGEN SATURATION: 96 % | HEIGHT: 61 IN | DIASTOLIC BLOOD PRESSURE: 76 MMHG

## 2017-11-20 LAB
ATRIAL RATE: 69 BPM
CALCULATED P AXIS, ECG09: 48 DEGREES
CALCULATED R AXIS, ECG10: 10 DEGREES
CALCULATED T AXIS, ECG11: 20 DEGREES
DIAGNOSIS, 93000: NORMAL
GLUCOSE BLD STRIP.AUTO-MCNC: 117 MG/DL (ref 65–100)
HGB BLD-MCNC: 11.8 G/DL (ref 11.5–16)
P-R INTERVAL, ECG05: 168 MS
Q-T INTERVAL, ECG07: 434 MS
QRS DURATION, ECG06: 92 MS
QTC CALCULATION (BEZET), ECG08: 465 MS
SERVICE CMNT-IMP: ABNORMAL
VENTRICULAR RATE, ECG03: 69 BPM

## 2017-11-20 PROCEDURE — 74011000250 HC RX REV CODE- 250: Performed by: SURGERY

## 2017-11-20 PROCEDURE — 77030002933 HC SUT MCRYL J&J -A: Performed by: SURGERY

## 2017-11-20 PROCEDURE — 77030026438 HC STYL ET INTUB CARD -A: Performed by: NURSE ANESTHETIST, CERTIFIED REGISTERED

## 2017-11-20 PROCEDURE — 88304 TISSUE EXAM BY PATHOLOGIST: CPT | Performed by: SURGERY

## 2017-11-20 PROCEDURE — 77030009852 HC PCH RTVR ENDOSC COVD -B: Performed by: SURGERY

## 2017-11-20 PROCEDURE — 77030020782 HC GWN BAIR PAWS FLX 3M -B

## 2017-11-20 PROCEDURE — 74011000250 HC RX REV CODE- 250

## 2017-11-20 PROCEDURE — 77030008771 HC TU NG SALEM SUMP -A: Performed by: NURSE ANESTHETIST, CERTIFIED REGISTERED

## 2017-11-20 PROCEDURE — 77030035048 HC TRCR ENDOSC OPTCL COVD -B: Performed by: SURGERY

## 2017-11-20 PROCEDURE — 77030008684 HC TU ET CUF COVD -B: Performed by: NURSE ANESTHETIST, CERTIFIED REGISTERED

## 2017-11-20 PROCEDURE — 77030019908 HC STETH ESOPH SIMS -A: Performed by: NURSE ANESTHETIST, CERTIFIED REGISTERED

## 2017-11-20 PROCEDURE — 76210000016 HC OR PH I REC 1 TO 1.5 HR: Performed by: SURGERY

## 2017-11-20 PROCEDURE — 74011250636 HC RX REV CODE- 250/636

## 2017-11-20 PROCEDURE — 77030035045 HC TRCR ENDOSC VRSPRT BLDLSS COVD -B: Performed by: SURGERY

## 2017-11-20 PROCEDURE — 74011250636 HC RX REV CODE- 250/636: Performed by: ANESTHESIOLOGY

## 2017-11-20 PROCEDURE — 77030011640 HC PAD GRND REM COVD -A: Performed by: SURGERY

## 2017-11-20 PROCEDURE — 77030002895 HC DEV VASC CLOSR COVD -B: Performed by: SURGERY

## 2017-11-20 PROCEDURE — 76010000138 HC OR TIME 0.5 TO 1 HR: Performed by: SURGERY

## 2017-11-20 PROCEDURE — 76210000021 HC REC RM PH II 0.5 TO 1 HR: Performed by: SURGERY

## 2017-11-20 PROCEDURE — 77030012022 HC APPL CLP ENDOSC COVD -C: Performed by: SURGERY

## 2017-11-20 PROCEDURE — 77030032490 HC SLV COMPR SCD KNE COVD -B: Performed by: SURGERY

## 2017-11-20 PROCEDURE — 74011000250 HC RX REV CODE- 250: Performed by: ANESTHESIOLOGY

## 2017-11-20 PROCEDURE — 77030031139 HC SUT VCRL2 J&J -A: Performed by: SURGERY

## 2017-11-20 PROCEDURE — 77030020053 HC ELECTRD LAPSCP COVD -B: Performed by: SURGERY

## 2017-11-20 PROCEDURE — 77030037032 HC INSRT SCIS CLICKLLINE DISP STOR -B: Performed by: SURGERY

## 2017-11-20 PROCEDURE — 76060000032 HC ANESTHESIA 0.5 TO 1 HR: Performed by: SURGERY

## 2017-11-20 PROCEDURE — 85018 HEMOGLOBIN: CPT

## 2017-11-20 PROCEDURE — 74011250636 HC RX REV CODE- 250/636: Performed by: SURGERY

## 2017-11-20 PROCEDURE — 82962 GLUCOSE BLOOD TEST: CPT

## 2017-11-20 PROCEDURE — 93005 ELECTROCARDIOGRAM TRACING: CPT

## 2017-11-20 PROCEDURE — 77030010507 HC ADH SKN DERMBND J&J -B: Performed by: SURGERY

## 2017-11-20 PROCEDURE — 77030014007 HC SPNG HEMSTAT J&J -B: Performed by: SURGERY

## 2017-11-20 PROCEDURE — 77030020747 HC TU INSUF ENDOSC TELE -A: Performed by: SURGERY

## 2017-11-20 RX ORDER — LIDOCAINE HYDROCHLORIDE 10 MG/ML
0.1 INJECTION, SOLUTION EPIDURAL; INFILTRATION; INTRACAUDAL; PERINEURAL AS NEEDED
Status: DISCONTINUED | OUTPATIENT
Start: 2017-11-20 | End: 2017-11-20 | Stop reason: HOSPADM

## 2017-11-20 RX ORDER — OXYCODONE AND ACETAMINOPHEN 5; 325 MG/1; MG/1
1 TABLET ORAL AS NEEDED
Status: DISCONTINUED | OUTPATIENT
Start: 2017-11-20 | End: 2017-11-20 | Stop reason: HOSPADM

## 2017-11-20 RX ORDER — LIDOCAINE HYDROCHLORIDE 20 MG/ML
INJECTION, SOLUTION EPIDURAL; INFILTRATION; INTRACAUDAL; PERINEURAL AS NEEDED
Status: DISCONTINUED | OUTPATIENT
Start: 2017-11-20 | End: 2017-11-20 | Stop reason: HOSPADM

## 2017-11-20 RX ORDER — GLYCOPYRROLATE 0.2 MG/ML
INJECTION INTRAMUSCULAR; INTRAVENOUS AS NEEDED
Status: DISCONTINUED | OUTPATIENT
Start: 2017-11-20 | End: 2017-11-20 | Stop reason: HOSPADM

## 2017-11-20 RX ORDER — SUCCINYLCHOLINE CHLORIDE 20 MG/ML
INJECTION INTRAMUSCULAR; INTRAVENOUS AS NEEDED
Status: DISCONTINUED | OUTPATIENT
Start: 2017-11-20 | End: 2017-11-20 | Stop reason: HOSPADM

## 2017-11-20 RX ORDER — HYDROMORPHONE HYDROCHLORIDE 2 MG/ML
INJECTION, SOLUTION INTRAMUSCULAR; INTRAVENOUS; SUBCUTANEOUS AS NEEDED
Status: DISCONTINUED | OUTPATIENT
Start: 2017-11-20 | End: 2017-11-20 | Stop reason: HOSPADM

## 2017-11-20 RX ORDER — HYDROMORPHONE HYDROCHLORIDE 1 MG/ML
0.2 INJECTION, SOLUTION INTRAMUSCULAR; INTRAVENOUS; SUBCUTANEOUS
Status: DISCONTINUED | OUTPATIENT
Start: 2017-11-20 | End: 2017-11-20 | Stop reason: HOSPADM

## 2017-11-20 RX ORDER — MORPHINE SULFATE 10 MG/ML
2 INJECTION, SOLUTION INTRAMUSCULAR; INTRAVENOUS
Status: DISCONTINUED | OUTPATIENT
Start: 2017-11-20 | End: 2017-11-20 | Stop reason: HOSPADM

## 2017-11-20 RX ORDER — NEOSTIGMINE METHYLSULFATE 1 MG/ML
INJECTION INTRAVENOUS AS NEEDED
Status: DISCONTINUED | OUTPATIENT
Start: 2017-11-20 | End: 2017-11-20 | Stop reason: HOSPADM

## 2017-11-20 RX ORDER — SODIUM CHLORIDE 0.9 % (FLUSH) 0.9 %
5-10 SYRINGE (ML) INJECTION AS NEEDED
Status: DISCONTINUED | OUTPATIENT
Start: 2017-11-20 | End: 2017-11-20 | Stop reason: HOSPADM

## 2017-11-20 RX ORDER — SODIUM CHLORIDE 0.9 % (FLUSH) 0.9 %
5-10 SYRINGE (ML) INJECTION EVERY 8 HOURS
Status: DISCONTINUED | OUTPATIENT
Start: 2017-11-20 | End: 2017-11-20 | Stop reason: HOSPADM

## 2017-11-20 RX ORDER — PROPOFOL 10 MG/ML
INJECTION, EMULSION INTRAVENOUS AS NEEDED
Status: DISCONTINUED | OUTPATIENT
Start: 2017-11-20 | End: 2017-11-20 | Stop reason: HOSPADM

## 2017-11-20 RX ORDER — ONDANSETRON 2 MG/ML
4 INJECTION INTRAMUSCULAR; INTRAVENOUS AS NEEDED
Status: DISCONTINUED | OUTPATIENT
Start: 2017-11-20 | End: 2017-11-20 | Stop reason: HOSPADM

## 2017-11-20 RX ORDER — MIDAZOLAM HYDROCHLORIDE 1 MG/ML
INJECTION, SOLUTION INTRAMUSCULAR; INTRAVENOUS AS NEEDED
Status: DISCONTINUED | OUTPATIENT
Start: 2017-11-20 | End: 2017-11-20 | Stop reason: HOSPADM

## 2017-11-20 RX ORDER — HYDROCODONE BITARTRATE AND ACETAMINOPHEN 5; 325 MG/1; MG/1
1 TABLET ORAL
Qty: 28 TAB | Refills: 0 | Status: SHIPPED | OUTPATIENT
Start: 2017-11-20 | End: 2017-12-04

## 2017-11-20 RX ORDER — BUPIVACAINE HYDROCHLORIDE AND EPINEPHRINE 5; 5 MG/ML; UG/ML
30 INJECTION, SOLUTION EPIDURAL; INTRACAUDAL; PERINEURAL ONCE
Status: COMPLETED | OUTPATIENT
Start: 2017-11-20 | End: 2017-11-20

## 2017-11-20 RX ORDER — FENTANYL CITRATE 50 UG/ML
50 INJECTION, SOLUTION INTRAMUSCULAR; INTRAVENOUS AS NEEDED
Status: DISCONTINUED | OUTPATIENT
Start: 2017-11-20 | End: 2017-11-20 | Stop reason: HOSPADM

## 2017-11-20 RX ORDER — MIDAZOLAM HYDROCHLORIDE 1 MG/ML
0.5 INJECTION, SOLUTION INTRAMUSCULAR; INTRAVENOUS
Status: DISCONTINUED | OUTPATIENT
Start: 2017-11-20 | End: 2017-11-20 | Stop reason: HOSPADM

## 2017-11-20 RX ORDER — IBUPROFEN 600 MG/1
600 TABLET ORAL
Qty: 21 TAB | Refills: 0 | Status: SHIPPED | OUTPATIENT
Start: 2017-11-20 | End: 2017-12-04

## 2017-11-20 RX ORDER — ROCURONIUM BROMIDE 10 MG/ML
INJECTION, SOLUTION INTRAVENOUS AS NEEDED
Status: DISCONTINUED | OUTPATIENT
Start: 2017-11-20 | End: 2017-11-20 | Stop reason: HOSPADM

## 2017-11-20 RX ORDER — CEFAZOLIN SODIUM/WATER 2 G/20 ML
2 SYRINGE (ML) INTRAVENOUS ONCE
Status: COMPLETED | OUTPATIENT
Start: 2017-11-20 | End: 2017-11-20

## 2017-11-20 RX ORDER — DIPHENHYDRAMINE HYDROCHLORIDE 50 MG/ML
12.5 INJECTION, SOLUTION INTRAMUSCULAR; INTRAVENOUS AS NEEDED
Status: DISCONTINUED | OUTPATIENT
Start: 2017-11-20 | End: 2017-11-20 | Stop reason: HOSPADM

## 2017-11-20 RX ORDER — FENTANYL CITRATE 50 UG/ML
25 INJECTION, SOLUTION INTRAMUSCULAR; INTRAVENOUS
Status: DISCONTINUED | OUTPATIENT
Start: 2017-11-20 | End: 2017-11-20 | Stop reason: HOSPADM

## 2017-11-20 RX ORDER — SODIUM CHLORIDE 9 MG/ML
25 INJECTION, SOLUTION INTRAVENOUS CONTINUOUS
Status: DISCONTINUED | OUTPATIENT
Start: 2017-11-20 | End: 2017-11-20 | Stop reason: HOSPADM

## 2017-11-20 RX ORDER — FENTANYL CITRATE 50 UG/ML
INJECTION, SOLUTION INTRAMUSCULAR; INTRAVENOUS AS NEEDED
Status: DISCONTINUED | OUTPATIENT
Start: 2017-11-20 | End: 2017-11-20 | Stop reason: HOSPADM

## 2017-11-20 RX ORDER — SODIUM CHLORIDE, SODIUM LACTATE, POTASSIUM CHLORIDE, CALCIUM CHLORIDE 600; 310; 30; 20 MG/100ML; MG/100ML; MG/100ML; MG/100ML
125 INJECTION, SOLUTION INTRAVENOUS CONTINUOUS
Status: DISCONTINUED | OUTPATIENT
Start: 2017-11-20 | End: 2017-11-20 | Stop reason: HOSPADM

## 2017-11-20 RX ORDER — MIDAZOLAM HYDROCHLORIDE 1 MG/ML
1 INJECTION, SOLUTION INTRAMUSCULAR; INTRAVENOUS AS NEEDED
Status: DISCONTINUED | OUTPATIENT
Start: 2017-11-20 | End: 2017-11-20 | Stop reason: HOSPADM

## 2017-11-20 RX ORDER — ONDANSETRON 2 MG/ML
INJECTION INTRAMUSCULAR; INTRAVENOUS AS NEEDED
Status: DISCONTINUED | OUTPATIENT
Start: 2017-11-20 | End: 2017-11-20 | Stop reason: HOSPADM

## 2017-11-20 RX ORDER — DEXAMETHASONE SODIUM PHOSPHATE 4 MG/ML
INJECTION, SOLUTION INTRA-ARTICULAR; INTRALESIONAL; INTRAMUSCULAR; INTRAVENOUS; SOFT TISSUE AS NEEDED
Status: DISCONTINUED | OUTPATIENT
Start: 2017-11-20 | End: 2017-11-20 | Stop reason: HOSPADM

## 2017-11-20 RX ORDER — KETOROLAC TROMETHAMINE 30 MG/ML
INJECTION, SOLUTION INTRAMUSCULAR; INTRAVENOUS AS NEEDED
Status: DISCONTINUED | OUTPATIENT
Start: 2017-11-20 | End: 2017-11-20 | Stop reason: HOSPADM

## 2017-11-20 RX ADMIN — LIDOCAINE HYDROCHLORIDE 0.1 ML: 10 INJECTION, SOLUTION EPIDURAL; INFILTRATION; INTRACAUDAL; PERINEURAL at 10:23

## 2017-11-20 RX ADMIN — SODIUM CHLORIDE, SODIUM LACTATE, POTASSIUM CHLORIDE, AND CALCIUM CHLORIDE 125 ML/HR: 600; 310; 30; 20 INJECTION, SOLUTION INTRAVENOUS at 10:23

## 2017-11-20 RX ADMIN — HYDROMORPHONE HYDROCHLORIDE 0.5 MG: 1 INJECTION, SOLUTION INTRAMUSCULAR; INTRAVENOUS; SUBCUTANEOUS at 13:33

## 2017-11-20 RX ADMIN — FENTANYL CITRATE 100 MCG: 50 INJECTION, SOLUTION INTRAMUSCULAR; INTRAVENOUS at 12:38

## 2017-11-20 RX ADMIN — ROCURONIUM BROMIDE 5 MG: 10 INJECTION, SOLUTION INTRAVENOUS at 12:39

## 2017-11-20 RX ADMIN — Medication 2 G: at 12:42

## 2017-11-20 RX ADMIN — GLYCOPYRROLATE 0.4 MG: 0.2 INJECTION INTRAMUSCULAR; INTRAVENOUS at 13:12

## 2017-11-20 RX ADMIN — PROPOFOL 200 MG: 10 INJECTION, EMULSION INTRAVENOUS at 12:39

## 2017-11-20 RX ADMIN — ROCURONIUM BROMIDE 15 MG: 10 INJECTION, SOLUTION INTRAVENOUS at 12:47

## 2017-11-20 RX ADMIN — MIDAZOLAM HYDROCHLORIDE 2 MG: 1 INJECTION, SOLUTION INTRAMUSCULAR; INTRAVENOUS at 12:32

## 2017-11-20 RX ADMIN — HYDROMORPHONE HYDROCHLORIDE 0.5 MG: 2 INJECTION, SOLUTION INTRAMUSCULAR; INTRAVENOUS; SUBCUTANEOUS at 13:27

## 2017-11-20 RX ADMIN — NEOSTIGMINE METHYLSULFATE 2.5 MG: 1 INJECTION INTRAVENOUS at 13:12

## 2017-11-20 RX ADMIN — GLYCOPYRROLATE 0.1 MG: 0.2 INJECTION INTRAMUSCULAR; INTRAVENOUS at 12:51

## 2017-11-20 RX ADMIN — KETOROLAC TROMETHAMINE 30 MG: 30 INJECTION, SOLUTION INTRAMUSCULAR; INTRAVENOUS at 13:11

## 2017-11-20 RX ADMIN — ONDANSETRON 8 MG: 2 INJECTION INTRAMUSCULAR; INTRAVENOUS at 12:49

## 2017-11-20 RX ADMIN — SUCCINYLCHOLINE CHLORIDE 120 MG: 20 INJECTION INTRAMUSCULAR; INTRAVENOUS at 12:40

## 2017-11-20 RX ADMIN — LIDOCAINE HYDROCHLORIDE 80 MG: 20 INJECTION, SOLUTION EPIDURAL; INFILTRATION; INTRACAUDAL; PERINEURAL at 12:39

## 2017-11-20 RX ADMIN — DEXAMETHASONE SODIUM PHOSPHATE 4 MG: 4 INJECTION, SOLUTION INTRA-ARTICULAR; INTRALESIONAL; INTRAMUSCULAR; INTRAVENOUS; SOFT TISSUE at 12:49

## 2017-11-20 NOTE — OP NOTES
Operative Report    Date of Surgery: 11/20/2017     Preoperative Diagnosis: CALCULUS OF GALLBLADDER WITHOUT CHOLECYSTITIS WITHOUT OBSTRUCTION and umbilical hernia    Postoperative Diagnosis: same as above    Surgeon(s) and Role:     Chuck Dixon MD - Primary      Assistant:  Lenin Chavez CSA and Kristin RENE student    Anesthesia: General    Procedure: Procedure(s):  LAPAROSCOPIC CHOLECYSTECTOMY  and umbilical hernia repair    Findings: gallbladder with stones, Chronic inflammatory changes, cholangiogram--n/a    Estimated Blood Loss: 5 mL IV: LR 1000 mL           Drains: none       Specimens:   ID Type Source Tests Collected by Time Destination   1 : Gallbladder and contents    Lorena Judd MD 11/20/2017 1300 Pathology                Complications:  None; patient tolerated the procedure well. Wound prophylaxis: Ancef given IV by anesthetist prior to incision    VTE prophylaxis: SCDs fitted and started prior to induction of anesthesia    Indications: Pt with history of epigastric pain; ultrasound--gallstones; HIDA--non-filling GB. Here today for elective laparoscopic cholecystectomy     Procedure in Detail:  The patient was seen in the Holding Area. After obtaining informed consent the patient was taken to the operating room, identified as Berta Barnes, and the procedure verified. A Time Out was held and the above information confirmed. The patient was placed supine position. After establishing general anesthesia, the abdomen was prepped and draped in standard fashion. A small umbilical hernia was noted. Local anesthetic was administered to the dermis and the fascia at all the port sites. An incision was made in the LUQ then a 5-mm port was placed with the camera through the port. Placement was observed directly and no injury was seen to the underlying viscera. Insufflation was applied to achieve pneumoperitoneum of 15 mmHg. The pneumoperitoneum was maintained. Exploration confirmed the umbilical defect. The  12-mm port was placed at the umbilicus and the remaining ports were placed under direct vision. The patient was then positioned in reverse Trendelenburg. The gallbladder was identified; the fundus was grasped and retracted cephalad. The infundibulum was grasped and retracted laterally, exposing the peritoneum overlying the triangle of Calot. This was then divided and exposed in a blunt fashion. The cystic duct was clearly identified and bluntly dissected circumferentially. The cystic duct was then ligated with Endo Clips and divided. The cystic artery was identified, dissected free, ligated with Endoclips and divided as well. The gallbladder was dissected from the liver bed in retrograde fashion with the electrocautery. The gallbladder was placed in a specimen pouch. The liver bed was irrigated and inspected. Hemostasis was achieved with electrocautery and Surgicel. Clips were confirmed intact on the cystic duct and artery. The gallbladder was removed through the umbilical port. A 0 Vicryl suture was placed at the umbilical port site with a UR-6 needle. Pneumoperitoneum was completely reduced then the ports were removed. The umbilical port fascia was then closed with Vicryl suture; the skin was then closed with Monocryl subcuticular and Dermabond. Instrument, sponge, and needle counts were correct at closure and at the conclusion of the case. The patient was extubated and taken to recovery room in good condition having tolerated the procedure well. Disposition: PACU - hemodynamically stable.            Condition: stable    Signed By: Teddy Jordan MD     November 20, 2017

## 2017-11-20 NOTE — INTERVAL H&P NOTE
H&P Update:  Sophy Roberts was seen and examined. History and physical has been reviewed. Significant clinical changes have occurred as noted:  HIDA scan showed non-filling gallbladder .     Signed By: Rodolfo Castañeda MD     November 20, 2017 11:58 AM

## 2017-11-20 NOTE — DISCHARGE INSTRUCTIONS
Patient Discharge Instructions    Alma Delia Moura / [id number] : 1950    Admitted 2017 Discharged: 2017       PATIENT INSTRUCTIONS  GALLBLADDER SURGERY  (CHOLECYSTECTOMY)    FOLLOW-UP:  Please make an appointment with your physician in 10 - 14 day(s). Call your physician immediately if you have any fevers greater than 101.5, drainage from your wound that is not clear or looks infected, persistent bleeding, increasing abdominal pain, problems urinating, or persistent nausea/vomiting. You should be aware that you may have right shoulder pain after surgery and that this will progressively go away. This is called 'referred pain' and is from the area of the gallbladder. It can also be caused by gas that may be trapped under the diaphragm from the surgery, especially if it was performed laparoscopically through mini-incisions. This gas will progressively get reabsorbed by your body. WOUND CARE INSTRUCTIONS:   You may shower at home. If clothing rubs against the wound or causes irritation and the wound is not draining you may cover it with a dry dressing during the daytime. Try to keep the wound dry and avoid ointments on the wound unless directed to do so. If the wound becomes bright red and painful or starts to drain infected material that is not clear, please contact your physician immediately. You should also call if you begin to drain fluid that is thin and greenish-brown from the wound and appears to look like bile. If the wound though is mildly pink and has a thick firm ridge underneath it, this is normal, and is referred to as a healing ridge. This will resolve over the next 4-6 weeks. Place an ice pack on the navel incision for the next 48 hours. After that, you may use a heating pad if you feel muscle tightening or pulling. DIET:  You may eat any foods that you can tolerate. It is a good idea to eat a high fiber diet and take in plenty of fluids to prevent constipation.   If you do become constipated you may want to take a mild laxative or take ducolax tablets on a daily basis until your bowel habits are regular. Constipation can be very uncomfortable, along with straining, after recent abdominal surgery. ACTIVITY:  You are encouraged to cough and deep breath or use your incentive spirometer if you were given one, every 15-30 minutes when awake. This will help prevent respiratory complications and low grade fevers post-operatively. You may want to hug a pillow when coughing and sneezing to add additional support to the surgical area(s) which will decrease pain during these times. You are encouraged to walk and engage in light activity for the next two weeks. You should not lift more than 20 pounds during this time frame as it could put you at increased risk for a post-operative hernia. Twenty pounds is roughly equivalent to a plastic bag of groceries. · Most people are able to return to work within 1 to 2 weeks after surgery. · You may shower 24 hours after surgery. Pat the cut (incision) dry. Do not take a bath for the first week. · Your doctor will tell you when you can have sex again. MEDICATIONS:  Try to take narcotic medications and anti-inflammatory medications, such as tylenol, ibuprofen, naprosyn, etc., with food. This will minimize stomach upset from the medication. Should you develop nausea and vomiting from the pain medication, or develop a rash, please discontinue the medication and contact your physician. You should not drive, make important decisions, or operate machinery when taking narcotic pain medication. Take ibuprofen (Motrin) as scheduled (do not wait for pain) then combine with hydrocodone/acetaminophen (Lorcet, Lortab, Maxidone, Norco, Vicodin, Xodol, Zydone) as directed for severe pain. QUESTIONS:  Please feel free to call Dr. Radha Booth office ([phone]) if you have any questions, and they will be glad to assist you.       Follow-up with Telma LING on 12/04/17 at 10:20 AM.       Information obtained by :    I understand that if any problems occur once I am at home I am to contact my physician. I understand and acknowledge receipt of the instructions indicated above. Physician's or R.N.'s Signature                                                                  Date/Time                                                                                                                                              Patient or Representative Signature                                                          Date/Time     ______________________________________________________________________    Anesthesia Discharge Instructions    After general anesthesia or intervenous sedation, for 24 hours or while taking prescription Narcotics:  · Limit your activities  · Do not drive or operate hazardous machinery  · If you have not urinated within 8 hours after discharge, please contact your surgeon on call. · Do not make important personal or business decisions  · Do not drink alcoholic beverages    Report the following to your surgeon:  · Excessive pain, swelling, redness or odor of or around the surgical area  · Temperature over 100.5 degrees  · Nausea and vomiting lasting longer than 4 hours or if unable to take medication  · Any signs of decreased circulation or nerve impairment to extremity:  Change in color, persistent numbness, tingling, coldness or increased pain.   · Any questions

## 2017-11-20 NOTE — ROUTINE PROCESS
PACU HANDOFF:    Patient: Olivia Gallegos MRN: [id number]  SSN: [id number]   YOB: 1950  Age: 78 y.o. Sex: female     Patient is status post Procedure(s):  LAPAROSCOPIC CHOLECYSTECTOMY .     Surgeon(s) and Role:     Miles Dalton MD - Primary    Local/Dose/Irrigation: 30 ml 0.5% Bupivacaine with epi for local                  Peripheral IV 11/20/17 Right Antecubital (Active)   Dressing Status Clean, dry, & intact 11/20/2017 10:22 AM   Dressing Type Transparent 11/20/2017 10:22 AM   Hub Color/Line Status Infusing 11/20/2017 10:22 AM            Airway - Endotracheal Tube 11/20/17 Oral (Active)       Airway - Endotracheal Tube 11/20/17 Oral (Active)             Dressing/Packing:  Wound Abdomen-DRESSING TYPE: Topical skin adhesive/glue (dermabond x4 incision sites) (11/20/17 1300)  Splint/Cast:  ]    Other: SCD sleeves

## 2017-11-20 NOTE — IP AVS SNAPSHOT
[address] 
[phone] Patient: Carlitos Oglesby MRN: [id number] ZNN:[id number] About your hospitalization You were admitted on:  November 20, 2017 You last received care in the:  [facility] PACU You were discharged on:  November 20, 2017 Why you were hospitalized Your primary diagnosis was:  Not on File Things You Need To Do (next 8 weeks) Follow up with Leslie Marcelo MD  
  
Phone:  [phone] Where:  [facility] [place], [address] [address], [place] [address] [phone] Schedule an appointment with Mary Anand MD as soon as possible for a visit in 2 week(s) Phone:  [phone] Where:  [address] [place] [address], [facility] [phone] Monday Dec 04, 2017 POST OP with ANURADHA Chacon at 10:20 AM  
Where:  [place] [phone] ([facility]) Discharge Orders None A check therese indicates which time of day the medication should be taken. My Medications TAKE these medications as instructed Instructions Each Dose to Equal  
 Morning Noon Evening Bedtime ALLERCLEAR 10 mg tablet Generic drug:  loratadine Your last dose was: Your next dose is: Take 10 mg by mouth daily as needed for Allergies. 10 mg  
    
   
   
   
  
 chlorthalidone 50 mg tablet Commonly known as:  Chales Chamber Your last dose was: Your next dose is: TAKE ONE TABLET BY MOUTH ONCE DAILY **REPLACES  THE  25  MG  DOSE** Cholecalciferol (Vitamin D3) 2,000 unit Cap capsule Commonly known as:  VITAMIN D3 Your last dose was: Your next dose is: Take 2,000 Units by mouth daily. 2000 Units  
    
   
   
   
  
 fenofibrate nanocrystallized 145 mg tablet Commonly known as:  [employer] Your last dose was: Your next dose is: TAKE ONE TABLET BY MOUTH ONCE DAILY  
     
   
   
   
  
 GLUCOSAMINE-CHONDROITIN PO Your last dose was: Your next dose is: Take  by mouth. 1500mg/1200mg per 2 pills. Takes one pill once daily. HYDROcodone-acetaminophen 5-325 mg per tablet Commonly known as:  Amadou Johnson Your last dose was: Your next dose is: Take 1 Tab by mouth every six (6) hours as needed for Pain. Max Daily Amount: 4 Tabs. 1 Tab  
    
   
   
   
  
 ibuprofen 600 mg tablet Commonly known as:  MOTRIN Your last dose was: Your next dose is: Take 1 Tab by mouth every eight (8) hours as needed for Pain. 600 mg  
    
   
   
   
  
 levothyroxine 112 mcg tablet Commonly known as:  SYNTHROID Your last dose was: Your next dose is: TAKE ONE TABLET BY MOUTH ONCE DAILY BEFORE BREAKFAST MEGARED OMEGA-3 KRILL OIL PO Your last dose was: Your next dose is: Take 500 mg by mouth daily. 500 mg  
    
   
   
   
  
 metFORMIN 1,000 mg tablet Commonly known as:  GLUCOPHAGE Your last dose was: Your next dose is: TAKE ONE TABLET BY MOUTH ONCE DAILY  
     
   
   
   
  
 omeprazole 40 mg capsule Commonly known as:  PRILOSEC Your last dose was: Your next dose is: Take 1 Cap by mouth daily. 40 mg  
    
   
   
   
  
 ondansetron hcl 4 mg tablet Commonly known as:  Tommas Harder Your last dose was: Your next dose is: Take 4 mg by mouth three (3) times daily. With food. 4 mg * PREMARIN 0.625 mg tablet Generic drug:  conjugated estrogens Your last dose was: Your next dose is: Take 0.625 mg by mouth nightly. 0.625 mg  
    
   
   
   
  
 * PREMARIN 0.625 mg tablet Generic drug:  conjugated estrogens Your last dose was: Your next dose is: TAKE ONE TABLET BY MOUTH ONCE DAILY  
     
   
   
   
  
 rOPINIRole 1 mg tablet Commonly known as:  Lesa Cordero Your last dose was: Your next dose is: Take 1 tablet by mouth BID  
     
   
   
   
  
 vitamin E 400 unit capsule Commonly known as:  Gold Peytondulce [address] Your last dose was: Your next dose is: Take 400 Units by mouth daily. 400 Units * Notice: This list has 2 medication(s) that are the same as other medications prescribed for you. Read the directions carefully, and ask your doctor or other care provider to review them with you. Where to Get Your Medications Information on where to get these meds will be given to you by the nurse or doctor. ! Ask your nurse or doctor about these medications HYDROcodone-acetaminophen 5-325 mg per tablet  
 ibuprofen 600 mg tablet Discharge Instructions Patient Discharge Instructions Jie Cheek / [id number] : 1950 Admitted 2017 Discharged: 2017 PATIENT INSTRUCTIONS 
GALLBLADDER SURGERY 
(CHOLECYSTECTOMY) FOLLOW-UP:  Please make an appointment with your physician in 10 - 14 day(s). Call your physician immediately if you have any fevers greater than 101.5, drainage from your wound that is not clear or looks infected, persistent bleeding, increasing abdominal pain, problems urinating, or persistent nausea/vomiting. You should be aware that you may have right shoulder pain after surgery and that this will progressively go away. This is called 'referred pain' and is from the area of the gallbladder. It can also be caused by gas that may be trapped under the diaphragm from the surgery, especially if it was performed laparoscopically through mini-incisions. This gas will progressively get reabsorbed by your body. WOUND CARE INSTRUCTIONS:   You may shower at home.  If clothing rubs against the wound or causes irritation and the wound is not draining you may cover it with a dry dressing during the daytime. Try to keep the wound dry and avoid ointments on the wound unless directed to do so. If the wound becomes bright red and painful or starts to drain infected material that is not clear, please contact your physician immediately. You should also call if you begin to drain fluid that is thin and greenish-brown from the wound and appears to look like bile. If the wound though is mildly pink and has a thick firm ridge underneath it, this is normal, and is referred to as a healing ridge. This will resolve over the next 4-6 weeks. Place an ice pack on the navel incision for the next 48 hours. After that, you may use a heating pad if you feel muscle tightening or pulling. DIET:  You may eat any foods that you can tolerate. It is a good idea to eat a high fiber diet and take in plenty of fluids to prevent constipation. If you do become constipated you may want to take a mild laxative or take ducolax tablets on a daily basis until your bowel habits are regular. Constipation can be very uncomfortable, along with straining, after recent abdominal surgery. ACTIVITY:  You are encouraged to cough and deep breath or use your incentive spirometer if you were given one, every 15-30 minutes when awake. This will help prevent respiratory complications and low grade fevers post-operatively. You may want to hug a pillow when coughing and sneezing to add additional support to the surgical area(s) which will decrease pain during these times. You are encouraged to walk and engage in light activity for the next two weeks. You should not lift more than 20 pounds during this time frame as it could put you at increased risk for a post-operative hernia. Twenty pounds is roughly equivalent to a plastic bag of groceries. · Most people are able to return to work within 1 to 2 weeks after surgery. · You may shower 24 hours after surgery. Pat the cut (incision) dry. Do not take a bath for the first week. · Your doctor will tell you when you can have sex again. MEDICATIONS:  Try to take narcotic medications and anti-inflammatory medications, such as tylenol, ibuprofen, naprosyn, etc., with food. This will minimize stomach upset from the medication. Should you develop nausea and vomiting from the pain medication, or develop a rash, please discontinue the medication and contact your physician. You should not drive, make important decisions, or operate machinery when taking narcotic pain medication. Take ibuprofen (Motrin) as scheduled (do not wait for pain) then combine with hydrocodone/acetaminophen (Lorcet, Lortab, Maxidone, Norco, Vicodin, Xodol, Zydone) as directed for severe pain. QUESTIONS:  Please feel free to call Dr. Karl Ryan office ([phone]) if you have any questions, and they will be glad to assist you. Follow-up with Zaira LING on 12/04/17 at 10:20 AM. Information obtained by : 
 
I understand that if any problems occur once I am at home I am to contact my physician. I understand and acknowledge receipt of the instructions indicated above. Physician's or R.N.'s Signature                                                                  Date/Time Patient or Representative Signature                                                          Date/Time 
  
______________________________________________________________________ Anesthesia Discharge Instructions After general anesthesia or intervenous sedation, for 24 hours or while taking prescription Narcotics: · Limit your activities · Do not drive or operate hazardous machinery · If you have not urinated within 8 hours after discharge, please contact your surgeon on call. · Do not make important personal or business decisions · Do not drink alcoholic beverages Report the following to your surgeon: 
· Excessive pain, swelling, redness or odor of or around the surgical area · Temperature over 100.5 degrees · Nausea and vomiting lasting longer than 4 hours or if unable to take medication · Any signs of decreased circulation or nerve impairment to extremity:  Change in color, persistent numbness, tingling, coldness or increased pain. · Any questions ACO Transitions of Care Introducing [facility] Natalie Kolb offers a voluntary care coordination program to provide high quality service and care to [facility] fee-for-service beneficiaries. Sydney Oneill was designed to help you enhance your health and well-being through the following services: ? Transitions of Care  support for individuals who are transitioning from one care setting to another (example: Hospital to home). ? Chronic and Complex Care Coordination  support for individuals and caregivers of those with serious or chronic illnesses or with more than one chronic (ongoing) condition and those who take a number of different medications. If you meet specific medical criteria, a [address] [place] [address] Rd may call you directly to coordinate your care with your primary care physician and your other care providers. For questions about the [facility] programs, please, contact your physicians office. For general questions or additional information about Accountable Care Organizations: 
Please visit www.medicare.gov/acos. html or call 1-800-MEDICARE (1-932.127.7297) TTY users should call 9-936.299.5463. Introducing [facility] & HEALTH SERVICES! Dear Elicia Martinez: Thank you for requesting a [employer] account. Our records indicate that you already have an active [employer] account. You can access your account anytime at [email or web address] Did you know that you can access your hospital and ER discharge instructions at any time in [employer]? You can also review all of your test results from your hospital stay or ER visit. Additional Information If you have questions, please visit the Frequently Asked Questions section of the [employer] website at [email or web address] Remember, [employer] is NOT to be used for urgent needs. For medical emergencies, dial 911. Now available from your iPhone and Android! Providers Seen During Your Hospitalization Provider Specialty Primary office phone Kayleen Truong, [address] [place] [address] Surgery [phone] Your Primary Care Physician (PCP) Primary Care Physician Office Phone Office Fax Marley Montoya [address], [place] [phone] You are allergic to the following Allergen Reactions Codeine Other (comments) Hallucination/room spinning Sudafed (Pseudoephedrine Hcl) Palpitations Darvocet A500 (Propoxyphene N-Acetaminophen) Nausea and Vomiting Recent Documentation Height Weight BMI OB Status Smoking Status 1.549 m 76.2 kg 31.74 kg/m2 Hysterectomy Never Smoker Emergency Contacts Name Discharge Info Relation Home Work Mobile Ganesh Law DISCHARGE CAREGIVER [3] Spouse [3] [phone] Maciej Guerra  Spouse [3] [phone] Patient Belongings The following personal items are in your possession at time of discharge: 
  Dental Appliances: None                Clothing:  (clothing bag to pacu) Discharge Instructions Attachments/References MEFS - HYDROCODONE/ACETAMINOPHEN (VICODIN, Jane Punt, LORTAB) - (BY MOUTH) (ENGLISH) MEFS - IBUPROFEN (ADVIL, ADVIL CHILDREN'S, MOTRIN, CHILDREN'S IBUPROFEN) - (BY MOUTH) (ENGLISH) Patient Handouts Hydrocodone/Acetaminophen (Vicodin, Norco, Lortab) - (By mouth) Why this medicine is used:  
Treats pain. Contact a nurse or doctor right away if you have: · Blistering, peeling, red skin rash · Fast or slow heartbeat, shallow breathing, blue lips, fingernails, or skin · Anxiety, restlessness, muscle spasms, twitching, seeing or hearing things that are not there · Dark urine or pale stools, yellow skin or eyes · Extreme weakness, sweating, seizures, cold or clammy skin · Lightheadedness, dizziness, fainting, fever, sweating Common side effects: 
· Constipation, nausea, vomiting, loss of appetite, stomach pain · Tiredness or sleepiness © 2017 [address] Information is for End User's use only and may not be sold, redistributed or otherwise used for commercial purposes. Ibuprofen (Advil, Advil Children's, Motrin, Children's Ibuprofen) - (By mouth) Why this medicine is used:  
Treats pain and fever. This medicine is an NSAID. Contact a nurse or doctor right away if you have: 
· Change in how much or how often you urinate · Severe stomach pain, vomiting blood, bloody or black tarry stools · Swelling in your hands, ankles, or feet; rapid weight gain Common side effects: 
· Constipation, diarrhea, gas, mild upset stomach · Ringing in your ears, dizziness, headache © 2017 [address] Information is for End User's use only and may not be sold, redistributed or otherwise used for commercial purposes. Please provide this summary of care documentation to your next provider. Signatures-by signing, you are acknowledging that this After Visit Summary has been reviewed with you and you have received a copy. Patient Signature:  ____________________________________________________________  Date:  ____________________________________________________________  
  
Aloma Snellen    
    
 Provider Signature:  ____________________________________________________________ Date:  ____________________________________________________________

## 2017-11-20 NOTE — ANESTHESIA POSTPROCEDURE EVALUATION
Post-Anesthesia Evaluation and Assessment    Patient: Dutch Govea MRN: [id number]  SSN: [id number]    YOB: 1950  Age: 78 y.o. Sex: female       Cardiovascular Function/Vital Signs  Visit Vitals    /76    Pulse 83    Temp 36.5 °C (97.7 °F)    Resp 17    Ht 5' 1\" (1.549 m)    Wt 76.2 kg (168 lb)    SpO2 96%    BMI 31.74 kg/m2       Patient is status post general anesthesia for Procedure(s):  LAPAROSCOPIC CHOLECYSTECTOMY . Nausea/Vomiting: None    Postoperative hydration reviewed and adequate. Pain:  Pain Scale 1: Numeric (0 - 10) (11/20/17 1420)  Pain Intensity 1: 0 (11/20/17 1420)   Managed    Neurological Status:   Neuro (WDL): Within Defined Limits (11/20/17 1420)   At baseline    Mental Status and Level of Consciousness: Arousable    Pulmonary Status:   O2 Device: Room air (11/20/17 1517)   Adequate oxygenation and airway patent    Complications related to anesthesia: None    Post-anesthesia assessment completed.  No concerns    Signed By: Lalit Milian MD     November 20, 2017

## 2017-11-20 NOTE — ANESTHESIA PREPROCEDURE EVALUATION
Anesthetic History   No history of anesthetic complications            Review of Systems / Medical History  Patient summary reviewed, nursing notes reviewed and pertinent labs reviewed    Pulmonary  Within defined limits                 Neuro/Psych   Within defined limits           Cardiovascular    Hypertension: well controlled              Exercise tolerance: >4 METS     GI/Hepatic/Renal  Within defined limits              Endo/Other    Diabetes: using insulin  Hypothyroidism: well controlled       Other Findings              Physical Exam    Airway  Mallampati: II  TM Distance: > 6 cm  Neck ROM: normal range of motion   Mouth opening: Normal     Cardiovascular  Regular rate and rhythm,  S1 and S2 normal,  no murmur, click, rub, or gallop             Dental    Dentition: Caps/crowns     Pulmonary  Breath sounds clear to auscultation               Abdominal  GI exam deferred       Other Findings            Anesthetic Plan    ASA: 2  Anesthesia type: general          Induction: Intravenous  Anesthetic plan and risks discussed with: Patient

## 2017-11-20 NOTE — H&P (VIEW-ONLY)
Surgery Consultation    Subjective:     Sean Escamilla is a 78 y.o. female with a history of abdominal pain. She complains of right flank pain radiating around to abdomen. The pain first began 7 weeks ago which would first start in the morning, but not related to eating, and would persist throughout the day. Patient notes that the pain is more severe at night. She has had nausea and vomiting. She notes that she was placed on Zofran, which alleviated the vomiting, but continues to experience nausea. Patient was also placed omeprazole by GI specialist, which did not alleviate symptoms. The patient  has not had jaundice, acholic stools or dark urine and has not had a history of pancreatitis or hepatitis. Ultrasound of the abdomen (17): IMPRESSION:  1. Cholelithiasis. Patient is tender over the right upper quadrant suggesting an  element of cholecystitis. There is no ductal dilatation.  The liver is diffusely  increased in echogenicity may represent hepatic steatosis      Pt is referred by Clarke Crawford MD.    Patient Active Problem List    Diagnosis Date Noted    Neuropathic pain of both feet 2016    Mixed hyperlipidemia 2016    Advance directive discussed with patient 2016    Essential hypertension 2010    Acquired hypothyroidism 2010    Back pain 2010     Past Medical History:   Diagnosis Date    Cancer ([address]     skin    Chronic pain     back - PT    Diabetes ([address]     Hypercholesterolemia     Hypertension     Ill-defined condition     N/V    Ill-defined condition     right eye scheduled for cataract removed 2017    Thyroid disease       Past Surgical History:   Procedure Laterality Date    BREAST SURGERY PROCEDURE UNLISTED  ,3/2007,     breast biopsy - all benign    HX CATARACT REMOVAL Left     HX COLONOSCOPY  16    Dr. Rosalia Cruz GYN       x 2, hysterectomy    HX MALIGNANT SKIN LESION EXCISION      skin cancer from nose - BCCA per pt      Social History   Substance Use Topics    Smoking status: Never Smoker    Smokeless tobacco: Never Used    Alcohol use Yes      Comment: maybe once a year - has a sip      Family History   Problem Relation Age of Onset    Heart Disease Father      CAD, CABG    Diabetes Father      insulin    Hypertension Mother     Cancer Mother      nose - skin - BCCA    Hypertension Brother     Hypertension Brother     Other Brother      PVD    Kidney Disease Brother      dialysis    Diabetes Brother      insulin      Current Outpatient Prescriptions   Medication Sig    PREMARIN 0.625 mg tablet TAKE ONE TABLET BY MOUTH ONCE DAILY    metFORMIN (GLUCOPHAGE) 1,000 mg tablet TAKE ONE TABLET BY MOUTH ONCE DAILY    omeprazole (PRILOSEC) 40 mg capsule Take 1 Cap by mouth daily.  conjugated estrogens (PREMARIN) 0.625 mg tablet Take 0.625 mg by mouth nightly.  ondansetron hcl (ZOFRAN) 4 mg tablet Take 4 mg by mouth three (3) times daily. With food.  vitamin E (AQUA GEMS) 400 unit capsule Take 400 Units by mouth daily.  Cholecalciferol, Vitamin D3, (VITAMIN D3) 2,000 unit cap capsule Take 2,000 Units by mouth daily.  KRILL/OM-3/DHA/EPA/PHOSPHO/AST (MEGARED OMEGA-3 KRILL OIL PO) Take 500 mg by mouth daily.  GLUCOSAMINE/CHONDROITIN SULF A (GLUCOSAMINE-CHONDROITIN PO) Take  by mouth. 1500mg/1200mg per 2 pills. Takes one pill once daily.  loratadine (ALLERCLEAR) 10 mg tablet Take 10 mg by mouth daily as needed for Allergies.  levothyroxine (SYNTHROID) 112 mcg tablet TAKE ONE TABLET BY MOUTH ONCE DAILY BEFORE BREAKFAST    fenofibrate nanocrystallized (TRICOR) 145 mg tablet TAKE ONE TABLET BY MOUTH ONCE DAILY    chlorthalidone (HYGROTEN) 50 mg tablet TAKE ONE TABLET BY MOUTH ONCE DAILY **REPLACES  THE  25  MG  DOSE**    rOPINIRole (REQUIP) 1 mg tablet Take 1 tablet by mouth BID     No current facility-administered medications for this visit.        Allergies   Allergen Reactions    Sudafed [Pseudoephedrine Hcl] Palpitations    Codeine Other (comments)     Hallucination/room spinning    Darvocet A500 [Propoxyphene N-Acetaminophen] Nausea and Vomiting        Review of Systems:  A comprehensive review of 12 systems was negative except for:   Cardiovascular: high blood pressure   Gastrointestinal: gallbladder problems, diarrhea, stomach pain, nausea   Musculoskeletal: back trouble         Objective:     Visit Vitals    /88    Pulse 88    Temp 98.6 °F (37 °C) (Oral)    Resp 20    Ht 5' 1\" (1.549 m)    Wt 174 lb 12.8 oz (79.3 kg)    SpO2 93%    BMI 33.03 kg/m2        Physical Exam:    General:  alert, cooperative, no distress, appears stated age   Eyes:  conjunctivae and sclerae normal, EOMs intact   Throat & Neck: no erythema or exudates noted and neck supple and symmetrical; no palpable masses   Lungs:   clear to auscultation bilaterally   Heart:  Regular rate and rhythm   Abdomen:   abdomen is soft and flat without significant masses, organomegaly or guarding, normal bowel sounds, no striae, dilated veins, rashes, or lesions  Tenderness to palpation in RLQ   Extremities: extremities normal, atraumatic, no edema   Skin: Normal.   Neuro: Mental status: Alert, oriented, thought content appropriate  Gait: Normal   Lymphatic: No supraclavicular adenopathy noted              Imaging and Lab Review:     No results found for this or any previous visit (from the past 24 hour(s)). images and reports reviewed    Assessment:   Diagnoses and all orders for this visit:    1. Right flank pain  -     NM HEPATOBILIARY DUCT SCAN; Future    2. Intractable cyclical vomiting with nausea  -     NM HEPATOBILIARY DUCT SCAN; Future    3. Calculus of gallbladder without cholecystitis without obstruction  -     NM HEPATOBILIARY DUCT SCAN; Future        Patient describes symptoms that are not consistent with typical presentations of biliary colic.  We will proceed with a HIDA scan to further evaluate for any gallbladder dysfunction. Recommendation:     1. Recommend the patient undergo a HIDA for further evaluation prior to considering gallbladder removal.     2. I explained the indications for laparoscopic cholecystectomy as well as the alternatives. I discussed the potential risks, including but not limited to bleeding, wound infection, trocar injuries and also the possible need for conversion to open procedure. She indicates that she understands the risks, accepts and wishes to proceed. 3. A patient education booklet on laparoscopic cholecystectomy was given to the patient. 4. Follow up following completion of radiological studies. If consistent with chronic cholecystitis will request auth for laparoscopic cholecystectomy. 3:33 PM - 3:58 PM   Total time spent with patient, greater than 50% of the time was spent in counselin minutes.       Signed By: Rose Todd MD    2017       Written by Tarun Abraham, as dictated by Rose Todd MD.       Cc: MD Martin Olson MD

## 2017-12-04 ENCOUNTER — OFFICE VISIT (OUTPATIENT)
Dept: SURGERY | Age: 67
End: 2017-12-04

## 2017-12-04 VITALS
DIASTOLIC BLOOD PRESSURE: 84 MMHG | RESPIRATION RATE: 18 BRPM | HEIGHT: 61 IN | SYSTOLIC BLOOD PRESSURE: 142 MMHG | BODY MASS INDEX: 31.53 KG/M2 | WEIGHT: 167 LBS | HEART RATE: 69 BPM | OXYGEN SATURATION: 97 % | TEMPERATURE: 97.6 F

## 2017-12-04 DIAGNOSIS — Z09 POSTOPERATIVE EXAMINATION: Primary | ICD-10-CM

## 2017-12-04 RX ORDER — ONDANSETRON 4 MG/1
4 TABLET, ORALLY DISINTEGRATING ORAL
Qty: 12 TAB | Refills: 0 | Status: SHIPPED | OUTPATIENT
Start: 2017-12-04 | End: 2017-12-08 | Stop reason: SDUPTHER

## 2017-12-04 NOTE — PATIENT INSTRUCTIONS
Activity:  You may swim in a pool. Continue to avoid heavy lifting, pushing or pulling > 15lbs. No strenuous abdominal exercises. Please continue your walking and other activities of daily living. A Healthy Lifestyle: Care Instructions  Your Care Instructions    A healthy lifestyle can help you feel good, stay at a healthy weight, and have plenty of energy for both work and play. A healthy lifestyle is something you can share with your whole family. A healthy lifestyle also can lower your risk for serious health problems, such as high blood pressure, heart disease, and diabetes. You can follow a few steps listed below to improve your health and the health of your family. Follow-up care is a key part of your treatment and safety. Be sure to make and go to all appointments, and call your doctor if you are having problems. It's also a good idea to know your test results and keep a list of the medicines you take. How can you care for yourself at home? · Do not eat too much sugar, fat, or fast foods. You can still have dessert and treats now and then. The goal is moderation. · Start small to improve your eating habits. Pay attention to portion sizes, drink less juice and soda pop, and eat more fruits and vegetables. ¨ Eat a healthy amount of food. A 3-ounce serving of meat, for example, is about the size of a deck of cards. Fill the rest of your plate with vegetables and whole grains. ¨ Limit the amount of soda and sports drinks you have every day. Drink more water when you are thirsty. ¨ Eat at least 5 servings of fruits and vegetables every day. It may seem like a lot, but it is not hard to reach this goal. A serving or helping is 1 piece of fruit, 1 cup of vegetables, or 2 cups of leafy, raw vegetables. Have an apple or some carrot sticks as an afternoon snack instead of a candy bar. Try to have fruits and/or vegetables at every meal.  · Make exercise part of your daily routine.  You may want to start with simple activities, such as walking, bicycling, or slow swimming. Try to be active 30 to 60 minutes every day. You do not need to do all 30 to 60 minutes all at once. For example, you can exercise 3 times a day for 10 or 20 minutes. Moderate exercise is safe for most people, but it is always a good idea to talk to your doctor before starting an exercise program.  · Keep moving. [place] Feeling the lawn, work in the garden, or [employer]. Take the stairs instead of the elevator at work. · If you smoke, quit. People who smoke have an increased risk for heart attack, stroke, cancer, and other lung illnesses. Quitting is hard, but there are ways to boost your chance of quitting tobacco for good. ¨ Use nicotine gum, patches, or lozenges. ¨ Ask your doctor about stop-smoking programs and medicines. ¨ Keep trying. In addition to reducing your risk of diseases in the future, you will notice some benefits soon after you stop using tobacco. If you have shortness of breath or asthma symptoms, they will likely get better within a few weeks after you quit. · Limit how much alcohol you drink. Moderate amounts of alcohol (up to 2 drinks a day for men, 1 drink a day for women) are okay. But drinking too much can lead to liver problems, high blood pressure, and other health problems. Family health  If you have a family, there are many things you can do together to improve your health. · Eat meals together as a family as often as possible. · Eat healthy foods. This includes fruits, vegetables, lean meats and dairy, and whole grains. · Include your family in your fitness plan. Most people think of activities such as jogging or tennis as the way to fitness, but there are many ways you and your family can be more active. Anything that makes you breathe hard and gets your heart pumping is exercise. Here are some tips:  ¨ Walk to do errands or to take your child to school or the bus.   ¨ Go for a family bike ride after dinner instead of watching TV. Where can you learn more? Go to http://malathi-alana.info/. Enter P402 in the search box to learn more about \"A Healthy Lifestyle: Care Instructions. \"  Current as of: May 12, 2017  Content Version: 11.4  © 9258-9166 [employer], [employer]. Care instructions adapted under license by [employer] (which disclaims liability or warranty for this information). If you have questions about a medical condition or this instruction, always ask your healthcare professional. [address] any warranty or liability for your use of this information.

## 2017-12-04 NOTE — PROGRESS NOTES
Post op for lap enrike on 11/20/17. 1. Have you been to the ER, urgent care clinic since your last visit? Hospitalized since your last visit? No    2. Have you seen or consulted any other health care providers outside of the [address] [place] [address] since your last visit? Include any pap smears or colon screening.  No.

## 2017-12-04 NOTE — MR AVS SNAPSHOT
Visit Information Date & Time Provider Department Dept. Phone Encounter #  
 12/4/2017 10:20 AM ANURADHA Collins [place] [phone] [id number] Follow-up Instructions Return if symptoms worsen or fail to improve. Upcoming Health Maintenance Date Due  
 MEDICARE YEARLY EXAM 5/20/2018 GLAUCOMA SCREENING Q2Y 8/22/2018 BREAST CANCER SCRN MAMMOGRAM 11/1/2019 COLONOSCOPY 1/29/2021 DTaP/Tdap/Td series (2 - Td) 6/3/2023 Allergies as of 12/4/2017  Review Complete On: 12/4/2017 By: ANURADHA Collins Severity Noted Reaction Type Reactions Codeine High 02/25/2010    Other (comments) Hallucination/room spinning Sudafed [Pseudoephedrine Hcl] Medium 04/06/2011   Side Effect Palpitations Darvocet A500 [Propoxyphene N-acetaminophen]  03/16/2004    Nausea and Vomiting Current Immunizations  Reviewed on 10/2/2017 Name Date Influenza High Dose Vaccine PF 9/30/2017 Influenza Vaccine 12/16/2016, 10/1/2015, 11/1/2014 Pneumococcal Conjugate (PCV-13) 2/6/2016 Pneumococcal Polysaccharide (PPSV-23) 2/11/2017 Tdap 6/3/2013 Zoster Vaccine, Live 5/5/2016 Not reviewed this visit Vitals BP Pulse Temp Resp Height(growth percentile) Weight(growth percentile) 142/84 (BP 1 Location: Left arm, BP Patient Position: Sitting) 69 97.6 °F (36.4 °C) (Oral) 18 5' 1\" (1.549 m) 167 lb (75.8 kg) SpO2 BMI OB Status Smoking Status 97% 31.55 kg/m2 Hysterectomy Never Smoker BMI and BSA Data Body Mass Index Body Surface Area 31.55 kg/m 2 1.81 m 2 Preferred Pharmacy Pharmacy Name Phone [facility] [address] [facility], [address] [place] [address],[facility] Floor [phone] Your Updated Medication List  
  
   
This list is accurate as of: 12/4/17 10:46 AM.  Always use your most recent med list.  
  
  
  
  
 ALLERCLEAR 10 mg tablet Generic drug:  loratadine Take 10 mg by mouth daily as needed for Allergies. chlorthalidone 50 mg tablet Commonly known as:  HYGROTEN  
TAKE ONE TABLET BY MOUTH ONCE DAILY **REPLACES  THE  25  MG  DOSE** Cholecalciferol (Vitamin D3) 2,000 unit Cap capsule Commonly known as:  VITAMIN D3 Take 2,000 Units by mouth daily. fenofibrate nanocrystallized 145 mg tablet Commonly known as:  TRICOR  
TAKE ONE TABLET BY MOUTH ONCE DAILY  
  
 GLUCOSAMINE-CHONDROITIN PO Take  by mouth. 1500mg/1200mg per 2 pills. Takes one pill once daily. levothyroxine 112 mcg tablet Commonly known as:  SYNTHROID  
TAKE ONE TABLET BY MOUTH ONCE DAILY BEFORE BREAKFAST MEGARED OMEGA-3 KRILL OIL PO Take 500 mg by mouth daily. metFORMIN 1,000 mg tablet Commonly known as:  GLUCOPHAGE  
TAKE ONE TABLET BY MOUTH ONCE DAILY  
  
 ondansetron 4 mg disintegrating tablet Commonly known as:  ZOFRAN ODT Take 1 Tab by mouth every eight (8) hours as needed for Nausea. ondansetron hcl 4 mg tablet Commonly known as:  Chelsea Camas Take 4 mg by mouth three (3) times daily. With food. PREMARIN 0.625 mg tablet Generic drug:  conjugated estrogens TAKE ONE TABLET BY MOUTH ONCE DAILY  
  
 rOPINIRole 1 mg tablet Commonly known as:  Robert Dart Take 1 tablet by mouth BID  
  
 vitamin E 400 unit capsule Commonly known as:  [address] Take 400 Units by mouth daily. Prescriptions Printed Refills  
 ondansetron (ZOFRAN ODT) 4 mg disintegrating tablet 0 Sig: Take 1 Tab by mouth every eight (8) hours as needed for Nausea. Class: Print Route: Oral  
  
Follow-up Instructions Return if symptoms worsen or fail to improve. Patient Instructions Activity:  You may swim in a pool. Continue to avoid heavy lifting, pushing or pulling > 15lbs. No strenuous abdominal exercises. Please continue your walking and other activities of daily living. A Healthy Lifestyle: Care Instructions Your Care Instructions A healthy lifestyle can help you feel good, stay at a healthy weight, and have plenty of energy for both work and play. A healthy lifestyle is something you can share with your whole family. A healthy lifestyle also can lower your risk for serious health problems, such as high blood pressure, heart disease, and diabetes. You can follow a few steps listed below to improve your health and the health of your family. Follow-up care is a key part of your treatment and safety. Be sure to make and go to all appointments, and call your doctor if you are having problems. It's also a good idea to know your test results and keep a list of the medicines you take. How can you care for yourself at home? · Do not eat too much sugar, fat, or fast foods. You can still have dessert and treats now and then. The goal is moderation. · Start small to improve your eating habits. Pay attention to portion sizes, drink less juice and soda pop, and eat more fruits and vegetables. ¨ Eat a healthy amount of food. A 3-ounce serving of meat, for example, is about the size of a deck of cards. Fill the rest of your plate with vegetables and whole grains. ¨ Limit the amount of soda and sports drinks you have every day. Drink more water when you are thirsty. ¨ Eat at least 5 servings of fruits and vegetables every day. It may seem like a lot, but it is not hard to reach this goal. A serving or helping is 1 piece of fruit, 1 cup of vegetables, or 2 cups of leafy, raw vegetables. Have an apple or some carrot sticks as an afternoon snack instead of a candy bar. Try to have fruits and/or vegetables at every meal. 
· Make exercise part of your daily routine. You may want to start with simple activities, such as walking, bicycling, or slow swimming. Try to be active 30 to 60 minutes every day.  You do not need to do all 30 to 60 minutes all at once. For example, you can exercise 3 times a day for 10 or 20 minutes. Moderate exercise is safe for most people, but it is always a good idea to talk to your doctor before starting an exercise program. 
· Keep moving. [place] Limb the lawn, work in the garden, or [employer]. Take the stairs instead of the elevator at work. · If you smoke, quit. People who smoke have an increased risk for heart attack, stroke, cancer, and other lung illnesses. Quitting is hard, but there are ways to boost your chance of quitting tobacco for good. ¨ Use nicotine gum, patches, or lozenges. ¨ Ask your doctor about stop-smoking programs and medicines. ¨ Keep trying. In addition to reducing your risk of diseases in the future, you will notice some benefits soon after you stop using tobacco. If you have shortness of breath or asthma symptoms, they will likely get better within a few weeks after you quit. · Limit how much alcohol you drink. Moderate amounts of alcohol (up to 2 drinks a day for men, 1 drink a day for women) are okay. But drinking too much can lead to liver problems, high blood pressure, and other health problems. Family health If you have a family, there are many things you can do together to improve your health. · Eat meals together as a family as often as possible. · Eat healthy foods. This includes fruits, vegetables, lean meats and dairy, and whole grains. · Include your family in your fitness plan. Most people think of activities such as jogging or tennis as the way to fitness, but there are many ways you and your family can be more active. Anything that makes you breathe hard and gets your heart pumping is exercise. Here are some tips: 
¨ Walk to do errands or to take your child to school or the bus. ¨ Go for a family bike ride after dinner instead of watching TV. Where can you learn more? Go to http://malathi-alana.info/. Enter C153 in the search box to learn more about \"A Healthy Lifestyle: Care Instructions. \" Current as of: May 12, 2017 Content Version: 11.4 © 5694-8261 [employer]. Care instructions adapted under license by [employer] (which disclaims liability or warranty for this information). If you have questions about a medical condition or this instruction, always ask your healthcare professional. [address] any warranty or liability for your use of this information. Introducing [facility]! Dear Gayle Carey: Thank you for requesting a Beam. account. Our records indicate that you already have an active Beam. account. You can access your account anytime at [email or web address] Did you know that you can access your hospital and ER discharge instructions at any time in Beam.? You can also review all of your test results from your hospital stay or ER visit. Additional Information If you have questions, please visit the Frequently Asked Questions section of the Beam. website at [email or web address] Remember, Beam. is NOT to be used for urgent needs. For medical emergencies, dial 911. Now available from your iPhone and Android! Please provide this summary of care documentation to your next provider. Your primary care clinician is listed as [phone] If you have any questions after today's visit, please call [phone].

## 2017-12-04 NOTE — PROGRESS NOTES
Subjective:      Vinay Castillo is a 78 y.o. female presents for postop care 2 weeks following LAPAROSCOPIC CHOLECYSTECTOMY and Umbilical hernia repair by Dr. Melina Pritchett on 11/20/2017. Appetite is normal. Eating a regular diet without difficulty. Yesterday, she began with nausea and vomiting. She has also been experiencing intermittent diarrhea since last week. I suspect that this may be viral and not related to surgery. She denies fevers or chills. The patient is voiding without difficulty. The patient is not having any acute abdominal pain. Patient has an advanced directive: YES    Ms. Alyssa Dunn has a reminder for a \"due or due soon\" health maintenance. I have asked that she contact her primary care provider for follow-up on this health maintenance. Objective:     Visit Vitals    /84 (BP 1 Location: Left arm, BP Patient Position: Sitting)    Pulse 69    Temp 97.6 °F (36.4 °C) (Oral)    Resp 18    Ht 5' 1\" (1.549 m)    Wt 167 lb (75.8 kg)    SpO2 97%    BMI 31.55 kg/m2       General:  alert, cooperative, no distress, appears stated age   Abdomen: soft, bowel sounds quiet, non-tender   Incisions:   healing well, no drainage, no erythema, no hernia, no seroma, no swelling, no dehiscence, incisions well approximated     Assessment:     Doing well postoperatively. Plan:     1. Continue any current medications. 2. Wound care discussed. 3. She may increase activities as tolerated. .    Ms. Alyssa Dunn verbalized understanding and questions were answered to the best of my knowledge and ability. Healthy Lifestyle educational materials were provided.

## 2017-12-08 ENCOUNTER — TELEPHONE (OUTPATIENT)
Dept: SURGERY | Age: 67
End: 2017-12-08

## 2017-12-08 RX ORDER — ONDANSETRON 4 MG/1
4 TABLET, ORALLY DISINTEGRATING ORAL
Qty: 12 TAB | Refills: 0 | OUTPATIENT
Start: 2017-12-08 | End: 2019-06-10 | Stop reason: ALTCHOICE

## 2017-12-08 NOTE — TELEPHONE ENCOUNTER
Spoke with patient regarding pharmacy had not received prescription for Zofran. Zofran phoned into pharmacy on file. Shirley Terry

## 2018-01-15 RX ORDER — CHLORTHALIDONE 50 MG/1
50 TABLET ORAL DAILY
Qty: 90 TAB | Refills: 0 | Status: SHIPPED | OUTPATIENT
Start: 2018-01-15 | End: 2018-04-14 | Stop reason: SDUPTHER

## 2018-01-15 RX ORDER — LEVOTHYROXINE SODIUM 112 UG/1
TABLET ORAL
Qty: 90 TAB | Refills: 0 | Status: SHIPPED | OUTPATIENT
Start: 2018-01-15 | End: 2018-04-14 | Stop reason: SDUPTHER

## 2018-01-15 NOTE — TELEPHONE ENCOUNTER
Orders Placed This Encounter    chlorthalidone (HYGROTEN) 50 mg tablet     Sig: Take 1 Tab by mouth daily. Dispense:  90 Tab     Refill:  0    levothyroxine (SYNTHROID) 112 mcg tablet     Sig: TAKE ONE TABLET BY MOUTH ONCE DAILY BEFORE BREAKFAST     Dispense:  90 Tab     Refill:  0     The above medication refills were approved via verbal order by Dr. Shahana Cody III.

## 2018-02-27 RX ORDER — METFORMIN HYDROCHLORIDE 1000 MG/1
TABLET ORAL
Qty: 90 TAB | Refills: 0 | Status: SHIPPED | OUTPATIENT
Start: 2018-02-27 | End: 2018-05-22 | Stop reason: SINTOL

## 2018-04-15 RX ORDER — LEVOTHYROXINE SODIUM 112 UG/1
TABLET ORAL
Qty: 90 TAB | Refills: 0 | Status: SHIPPED | OUTPATIENT
Start: 2018-04-15 | End: 2018-04-16 | Stop reason: SDUPTHER

## 2018-04-15 RX ORDER — CHLORTHALIDONE 50 MG/1
TABLET ORAL
Qty: 90 TAB | Refills: 0 | Status: SHIPPED | OUTPATIENT
Start: 2018-04-15 | End: 2018-07-13 | Stop reason: SDUPTHER

## 2018-04-16 RX ORDER — LEVOTHYROXINE SODIUM 112 UG/1
TABLET ORAL
Qty: 90 TAB | Refills: 0 | Status: SHIPPED | OUTPATIENT
Start: 2018-04-16 | End: 2018-07-13 | Stop reason: SDUPTHER

## 2018-04-16 RX ORDER — ROPINIROLE 1 MG/1
TABLET, FILM COATED ORAL
Qty: 60 TAB | Refills: 5 | Status: SHIPPED | OUTPATIENT
Start: 2018-04-16 | End: 2018-04-16 | Stop reason: SDUPTHER

## 2018-04-16 RX ORDER — ACETAMINOPHEN 500 MG
2000 TABLET ORAL DAILY
Qty: 90 CAP | Refills: 0 | Status: SHIPPED | OUTPATIENT
Start: 2018-04-16

## 2018-04-17 ENCOUNTER — TELEPHONE (OUTPATIENT)
Dept: NEUROLOGY | Age: 68
End: 2018-04-17

## 2018-04-17 RX ORDER — ROPINIROLE 1 MG/1
TABLET, FILM COATED ORAL
Qty: 60 TAB | Refills: 5 | Status: SHIPPED | OUTPATIENT
Start: 2018-04-17 | End: 2018-09-25 | Stop reason: SDUPTHER

## 2018-04-17 NOTE — TELEPHONE ENCOUNTER
----- Message from Rose Mary Blair sent at 4/17/2018  9:15 AM EDT -----  Regarding: EROS Gonsalez/Refill  Pt request for a her prescription to be send to her [address] at [phone]. Her best contact number is [phone].

## 2018-05-18 RX ORDER — ESTROGENS, CONJUGATED 0.62 MG/1
TABLET, FILM COATED ORAL
Qty: 30 TAB | Refills: 5 | Status: SHIPPED | OUTPATIENT
Start: 2018-05-18 | End: 2018-05-22 | Stop reason: SDUPTHER

## 2018-05-18 NOTE — TELEPHONE ENCOUNTER
Orders Placed This Encounter    conjugated estrogens (PREMARIN) 0.625 mg tablet     Sig: Take 1 Tab by mouth daily. Dispense:  90 Tab     Refill:  1     Please consider 90 day supplies to promote better adherence     The above medication refills were approved via verbal order by Dr. Annamarie Zaragoza.

## 2018-05-18 NOTE — TELEPHONE ENCOUNTER
From: Joesph Robles  To:  Zaid Bell MD  Sent: 5/18/2018 9:46 AM EDT  Subject: Medication Renewal Request    Original authorizing provider: MD Joesph Land would like a refill of the following medications:  PREMARIN 0.625 mg tablet Zaid Bell MD]    Preferred pharmacy: [address] [place] [address]    Comment:

## 2018-05-22 ENCOUNTER — OFFICE VISIT (OUTPATIENT)
Dept: INTERNAL MEDICINE CLINIC | Age: 68
End: 2018-05-22

## 2018-05-22 ENCOUNTER — HOSPITAL ENCOUNTER (OUTPATIENT)
Dept: LAB | Age: 68
Discharge: HOME | End: 2018-05-22
Payer: MEDICARE

## 2018-05-22 VITALS
HEIGHT: 61 IN | OXYGEN SATURATION: 98 % | WEIGHT: 165.4 LBS | DIASTOLIC BLOOD PRESSURE: 74 MMHG | TEMPERATURE: 98.5 F | SYSTOLIC BLOOD PRESSURE: 138 MMHG | HEART RATE: 76 BPM | RESPIRATION RATE: 16 BRPM | BODY MASS INDEX: 31.23 KG/M2

## 2018-05-22 DIAGNOSIS — E78.2 MIXED HYPERLIPIDEMIA: ICD-10-CM

## 2018-05-22 DIAGNOSIS — Z00.00 ROUTINE GENERAL MEDICAL EXAMINATION AT A HEALTH CARE FACILITY: ICD-10-CM

## 2018-05-22 DIAGNOSIS — Z12.4 PAP SMEAR FOR CERVICAL CANCER SCREENING: ICD-10-CM

## 2018-05-22 DIAGNOSIS — E55.9 VITAMIN D DEFICIENCY: ICD-10-CM

## 2018-05-22 DIAGNOSIS — I10 ESSENTIAL HYPERTENSION: ICD-10-CM

## 2018-05-22 DIAGNOSIS — E03.9 ACQUIRED HYPOTHYROIDISM: ICD-10-CM

## 2018-05-22 DIAGNOSIS — Z00.00 MEDICARE ANNUAL WELLNESS VISIT, SUBSEQUENT: Primary | ICD-10-CM

## 2018-05-22 PROCEDURE — 80061 LIPID PANEL: CPT

## 2018-05-22 PROCEDURE — 85025 COMPLETE CBC W/AUTO DIFF WBC: CPT

## 2018-05-22 PROCEDURE — 88175 CYTOPATH C/V AUTO FLUID REDO: CPT | Performed by: INTERNAL MEDICINE

## 2018-05-22 PROCEDURE — 84443 ASSAY THYROID STIM HORMONE: CPT

## 2018-05-22 PROCEDURE — 82306 VITAMIN D 25 HYDROXY: CPT

## 2018-05-22 PROCEDURE — 36415 COLL VENOUS BLD VENIPUNCTURE: CPT

## 2018-05-22 PROCEDURE — 80053 COMPREHEN METABOLIC PANEL: CPT

## 2018-05-22 PROCEDURE — 81001 URINALYSIS AUTO W/SCOPE: CPT

## 2018-05-22 RX ORDER — KETOCONAZOLE 20 MG/G
CREAM TOPICAL
COMMUNITY
Start: 2018-04-16 | End: 2021-06-08 | Stop reason: ALTCHOICE

## 2018-05-22 NOTE — MR AVS SNAPSHOT
[address] [address] 
[phone] Patient: David Goss MRN:  [id number] Visit Information Date & Time Provider Department Dept. Phone Encounter #  
 5/22/2018  8:00 AM Venu Mendez MD [address] [address] Internal Medicine [phone] [id number] Upcoming Health Maintenance Date Due  
 MEDICARE YEARLY EXAM 5/20/2018 Influenza Age 5 to Adult 8/1/2018 GLAUCOMA SCREENING Q2Y 8/22/2018 BREAST CANCER SCRN MAMMOGRAM 11/1/2019 COLONOSCOPY 1/29/2021 DTaP/Tdap/Td series (2 - Td) 6/3/2023 Allergies as of 5/22/2018  Review Complete On: 5/22/2018 By: Venu Mendez MD  
  
 Severity Noted Reaction Type Reactions Codeine High 02/25/2010    Other (comments) Hallucination/room spinning Sudafed [Pseudoephedrine Hcl] Medium 04/06/2011   Side Effect Palpitations Darvocet A500 [Propoxyphene N-acetaminophen]  03/16/2004    Nausea and Vomiting Current Immunizations  Reviewed on 5/22/2018 Name Date Influenza High Dose Vaccine PF 9/30/2017 Influenza Vaccine 12/16/2016, 10/1/2015, 11/1/2014 Pneumococcal Conjugate (PCV-13) 2/6/2016 Pneumococcal Polysaccharide (PPSV-23) 2/11/2017 Tdap 6/3/2013 Zoster Vaccine, Live 5/5/2016 Reviewed by Venu Mendez MD on 5/22/2018 at  8:38 AM  
You Were Diagnosed With   
  
 Codes Comments Medicare annual wellness visit, subsequent    -  Primary ICD-10-CM: Z00.00 ICD-9-CM: V70.0 Essential hypertension     ICD-10-CM: I10 
ICD-9-CM: 401.9 Acquired hypothyroidism     ICD-10-CM: E03.9 ICD-9-CM: 244.9 Mixed hyperlipidemia     ICD-10-CM: E78.2 ICD-9-CM: 272.2 Routine general medical examination at a health care facility     ICD-10-CM: Z00.00 ICD-9-CM: V70.0 Vitamin D deficiency     ICD-10-CM: E55.9 ICD-9-CM: 268.9 Vitals BP Pulse Temp Resp Height(growth percentile) Weight(growth percentile) 150/78 (BP 1 Location: Right arm, BP Patient Position: Sitting) 76 98.5 °F (36.9 °C) (Oral) 16 5' 1\" (1.549 m) 165 lb 6.4 oz (75 kg) SpO2 BMI OB Status Smoking Status 98% 31.25 kg/m2 Hysterectomy Never Smoker BMI and BSA Data Body Mass Index Body Surface Area  
 31.25 kg/m 2 1.8 m 2 Preferred Pharmacy Pharmacy Name Phone [facility] [address] [place] [address], [address] [place] [address],[facility] Floor [phone] Your Updated Medication List  
  
   
This list is accurate as of 5/22/18  8:56 AM.  Always use your most recent med list.  
  
  
  
  
 ALLERCLEAR 10 mg tablet Generic drug:  loratadine Take 10 mg by mouth daily as needed for Allergies. chlorthalidone 50 mg tablet Commonly known as:  HYGROTEN  
TAKE ONE TABLET BY MOUTH ONCE DAILY Cholecalciferol (Vitamin D3) 2,000 unit Cap capsule Commonly known as:  VITAMIN D3 Take 2,000 Units by mouth daily. conjugated estrogens 0.625 mg tablet Commonly known as:  PREMARIN Take 1 Tab by mouth daily. fenofibrate nanocrystallized 145 mg tablet Commonly known as:  TRICOR  
TAKE ONE TABLET BY MOUTH ONCE DAILY  
  
 GLUCOSAMINE-CHONDROITIN PO Take  by mouth. 1500mg/1200mg per 2 pills. Takes one pill once daily. ketoconazole 2 % topical cream  
Commonly known as:  NIZORAL  
  
 levothyroxine 112 mcg tablet Commonly known as:  SYNTHROID  
TAKE ONE TABLET BY MOUTH ONCE DAILY BEFORE BREAKFAST MEGARED OMEGA-3 KRILL OIL PO Take 500 mg by mouth daily. ondansetron 4 mg disintegrating tablet Commonly known as:  ZOFRAN ODT Take 1 Tab by mouth every eight (8) hours as needed for Nausea. rOPINIRole 1 mg tablet Commonly known as:  REQUIP  
TAKE ONE TABLET BY MOUTH TWICE DAILY  
  
 varicella-zoster recombinant (PF) 50 mcg/0.5 mL Susr injection Commonly known as:  SHINGRIX  
0.5 mL by IntraMUSCular route once for 1 dose. vitamin E 400 unit capsule Commonly known as:  [address] Take 400 Units by mouth daily. Prescriptions Printed Refills  
 varicella-zoster recombinant, PF, (SHINGRIX) 50 mcg/0.5 mL susr injection 1 Si.5 mL by IntraMUSCular route once for 1 dose. Class: Print Route: IntraMUSCular We Performed the Following CBC WITH AUTOMATED DIFF [38834 CPT(R)] LIPID PANEL [00190 CPT(R)] METABOLIC PANEL, COMPREHENSIVE [66875 CPT(R)] TSH AND FREE T4 [80513 CPT(R)] UA/M W/RFLX CULTURE, ROUTINE [[id number] Custom] VITAMIN D, 25 HYDROXY [id number] CPT(R)] Patient Instructions Medicare Wellness Visit, Female The best way to live healthy is to have a lifestyle where you eat a well-balanced diet, exercise regularly, limit alcohol use, and quit all forms of tobacco/nicotine, if applicable. Regular preventive services are another way to keep healthy. Preventive services (vaccines, screening tests, monitoring & exams) can help personalize your care plan, which helps you manage your own care. Screening tests can find health problems at the earliest stages, when they are easiest to treat. [address] [facility] follows the current, evidence-based guidelines published by the [facility] [place] (USPSTF) when recommending preventive services for our patients. Because we follow these guidelines, sometimes recommendations change over time as research supports it. (For example, mammograms used to be recommended annually. Even though Medicare will still pay for an annual mammogram, the newer guidelines recommend a mammogram every two years for women of average risk.) Of course, you and your provider may decide to screen more often for some diseases, based on your risk and co-morbidities (chronic disease you are already diagnosed with). Preventive services for you include: - Medicare offers their members a free annual wellness visit, which is time for you and your primary care provider to discuss and plan for your preventive service needs. Take advantage of this benefit every year! 
 
-All people over age 72 should receive the recommended pneumonia vaccines. Current USPSTF guidelines recommend a series of two vaccines for the best pneumonia protection.  
 
-All adults should have a yearly flu vaccine and a tetanus vaccine every 10 years. All adults age 61 years should receive a shingles vaccine once in their lifetime.   
 
-A bone mass density test is recommended when a woman turns 65 to screen for osteoporosis. This test is only recommended once as a screening. Some providers will use this same test as a disease monitoring tool if you already have osteoporosis. -All adults age 38-68 years who are overweight should have a diabetes screening test once every three years.  
 
-Other screening tests & preventive services for persons with diabetes include: an eye exam to screen for diabetic retinopathy, a kidney function test, a foot exam, and stricter control over your cholesterol.  
 
-Cardiovascular screening for adults with routine risk involves an electrocardiogram (ECG) at intervals determined by the provider.  
 
-Colorectal cancer screenings should be done for adults age 54-65 years with normal risk. There are a number of acceptable methods of screening for this type of cancer. Each test has its own benefits and drawbacks. Discuss with your provider what is most appropriate for you during your annual wellness visit. The different tests include: colonoscopy (considered the best screening method), a fecal occult blood test, a fecal DNA test, and sigmoidoscopy.  
 
-Breast cancer screenings are recommended every other year for women of normal risk age 54-69 years.  
 
-Cervical cancer screenings for women over age 72 are only recommended with certain risk factors.  
 
-All adults born between [facility] should be screened once for Hepatitis C.  
 
 Here is a list of your current Health Maintenance items (your personalized list of preventive services) with a due date: 
Health Maintenance Due Topic Date Due  
 Annual Well Visit  05/20/2018 Introducing [facility] SERVICES! Dear Lonny: Thank you for requesting a [employer] account. Our records indicate that you already have an active Veebeam account. You can access your account anytime at [email or web address] Did you know that you can access your hospital and ER discharge instructions at any time in Veebeam? You can also review all of your test results from your hospital stay or ER visit. Additional Information If you have questions, please visit the Frequently Asked Questions section of the Veebeam website at [email or web address] Remember, Veebeam is NOT to be used for urgent needs. For medical emergencies, dial 911. Now available from your iPhone and Android! Please provide this summary of care documentation to your next provider. Your primary care clinician is listed as [phone] If you have any questions after today's visit, please call [phone].

## 2018-05-22 NOTE — PATIENT INSTRUCTIONS
Medicare Wellness Visit, Female    The best way to live healthy is to have a lifestyle where you eat a well-balanced diet, exercise regularly, limit alcohol use, and quit all forms of tobacco/nicotine, if applicable. Regular preventive services are another way to keep healthy. Preventive services (vaccines, screening tests, monitoring & exams) can help personalize your care plan, which helps you manage your own care. Screening tests can find health problems at the earliest stages, when they are easiest to treat. [address] follows the current, evidence-based guidelines published by the [facility] [place] ([facility]) when recommending preventive services for our patients. Because we follow these guidelines, sometimes recommendations change over time as research supports it. (For example, mammograms used to be recommended annually. Even though Medicare will still pay for an annual mammogram, the newer guidelines recommend a mammogram every two years for women of average risk.)    Of course, you and your provider may decide to screen more often for some diseases, based on your risk and co-morbidities (chronic disease you are already diagnosed with). Preventive services for you include:    - Medicare offers their members a free annual wellness visit, which is time for you and your primary care provider to discuss and plan for your preventive service needs. Take advantage of this benefit every year!    -All people over age 72 should receive the recommended pneumonia vaccines. Current USPSTF guidelines recommend a series of two vaccines for the best pneumonia protection.     -All adults should have a yearly flu vaccine and a tetanus vaccine every 10 years. All adults age 61 years should receive a shingles vaccine once in their lifetime.      -A bone mass density test is recommended when a woman turns 65 to screen for osteoporosis.  This test is only recommended once as a screening. Some providers will use this same test as a disease monitoring tool if you already have osteoporosis. -All adults age 38-68 years who are overweight should have a diabetes screening test once every three years.     -Other screening tests & preventive services for persons with diabetes include: an eye exam to screen for diabetic retinopathy, a kidney function test, a foot exam, and stricter control over your cholesterol.     -Cardiovascular screening for adults with routine risk involves an electrocardiogram (ECG) at intervals determined by the provider.     -Colorectal cancer screenings should be done for adults age 54-65 years with normal risk. There are a number of acceptable methods of screening for this type of cancer. Each test has its own benefits and drawbacks. Discuss with your provider what is most appropriate for you during your annual wellness visit. The different tests include: colonoscopy (considered the best screening method), a fecal occult blood test, a fecal DNA test, and sigmoidoscopy. -Breast cancer screenings are recommended every other year for women of normal risk age 54-69 years.     -Cervical cancer screenings for women over age 72 are only recommended with certain risk factors.     -All adults born between [facility] should be screened once for Hepatitis C.      Here is a list of your current Health Maintenance items (your personalized list of preventive services) with a due date:  Health Maintenance Due   Topic Date Due    Annual Well Visit  05/20/2018

## 2018-05-22 NOTE — PROGRESS NOTES
This is the Subsequent Medicare Annual Wellness Exam, performed 12 months or more after the Initial AWV or the last Subsequent AWV  As well as for follow-up of her health issues. She has been generally doing well. Her weight is down significantly since her gallbladder surgery. She thinks that has something to do with her appetite not being as good. She has some nausea in the morning time but no vomiting. She has loose bowel movements with each meal.  No melena or hematochezia. No fevers or chills. No change in bladder habits. She is up-to-date on mammograms. She is having no change in her bladder symptoms. No vaginal bleeding. No unusual muscle aches or joint aches. No rashes. She has seen the dermatologist in the last year for an evaluation. I have reviewed the patient's medical history in detail and updated the computerized patient record. History     Past Medical History:   Diagnosis Date    Cancer ([facility] [place] [address]     skin    Chronic pain     back - PT    Diabetes ([facility] [place] [address]     Hypercholesterolemia     Hypertension     Ill-defined condition     N/V    Ill-defined condition     right eye scheduled for cataract removed 2017    Thyroid disease       Past Surgical History:   Procedure Laterality Date    BREAST SURGERY PROCEDURE UNLISTED  ,3/2007,     breast biopsy - all benign    HX CATARACT REMOVAL Left     HX COLONOSCOPY  16    Dr. Lolis Quinn HX GYN       x 2, hysterectomy    HX LAP CHOLECYSTECTOMY  2017    [facility]    HX MALIGNANT SKIN LESION EXCISION      skin cancer from nose - BCCA per pt     Current Outpatient Prescriptions   Medication Sig Dispense Refill    conjugated estrogens (PREMARIN) 0.625 mg tablet Take 1 Tab by mouth daily.  90 Tab 1    rOPINIRole (REQUIP) 1 mg tablet TAKE ONE TABLET BY MOUTH TWICE DAILY 60 Tab 5    levothyroxine (SYNTHROID) 112 mcg tablet TAKE ONE TABLET BY MOUTH ONCE DAILY BEFORE BREAKFAST 90 Tab 0    Cholecalciferol, Vitamin D3, (VITAMIN D3) 2,000 unit cap capsule Take 2,000 Units by mouth daily. 90 Cap 0    chlorthalidone (HYGROTEN) 50 mg tablet TAKE ONE TABLET BY MOUTH ONCE DAILY 90 Tab 0    metFORMIN (GLUCOPHAGE) 1,000 mg tablet TAKE ONE TABLET BY MOUTH ONCE DAILY 90 Tab 0    vitamin E (AQUA GEMS) 400 unit capsule Take 400 Units by mouth daily.  KRILL/OM-3/DHA/EPA/PHOSPHO/AST (MEGARED OMEGA-3 KRILL OIL PO) Take 500 mg by mouth daily.  GLUCOSAMINE/CHONDROITIN SULF A (GLUCOSAMINE-CHONDROITIN PO) Take  by mouth. 1500mg/1200mg per 2 pills. Takes one pill once daily.  loratadine (ALLERCLEAR) 10 mg tablet Take 10 mg by mouth daily as needed for Allergies.  fenofibrate nanocrystallized (TRICOR) 145 mg tablet TAKE ONE TABLET BY MOUTH ONCE DAILY 90 Tab 0    ketoconazole (NIZORAL) 2 % topical cream       ondansetron (ZOFRAN ODT) 4 mg disintegrating tablet Take 1 Tab by mouth every eight (8) hours as needed for Nausea.  12 Tab 0     Allergies   Allergen Reactions    Codeine Other (comments)     Hallucination/room spinning    Sudafed [Pseudoephedrine Hcl] Palpitations    Darvocet A500 [Propoxyphene N-Acetaminophen] Nausea and Vomiting     Family History   Problem Relation Age of Onset    Heart Disease Father      CAD, CABG    Diabetes Father      insulin    Hypertension Mother    Raj Erb Cancer Mother      nose - skin - BCCA    Hypertension Brother     Hypertension Brother     Other Brother      PVD    Kidney Disease Brother      dialysis    Diabetes Brother      insulin     Social History   Substance Use Topics    Smoking status: Never Smoker    Smokeless tobacco: Never Used    Alcohol use Yes      Comment: maybe once a year - has a sip     Patient Active Problem List   Diagnosis Code    Back pain M54.9    Essential hypertension I10    Acquired hypothyroidism E03.9    Advance directive discussed with patient Z70.80    Mixed hyperlipidemia E78.2    Neuropathic pain of both feet G62.9   ROS - Per HPI  Physical Examination: General appearance - alert, well appearing, and in no distress  Eyes - pupils equal and reactive, extraocular eye movements intact  Ears - bilateral TM's and external ear canals normal  Nose - normal and patent, no erythema, discharge or polyps  Mouth - mucous membranes moist, pharynx normal without lesions  Neck - supple, no significant adenopathy  Lymphatics - no palpable lymphadenopathy, no hepatosplenomegaly  Chest - clear to auscultation, no wheezes, rales or rhonchi, symmetric air entry  Heart - normal rate, regular rhythm, normal S1, S2, no murmurs, rubs, clicks or gallops  Abdomen - soft, nontender, nondistended, no masses or organomegaly  Breasts - breasts appear normal, no suspicious masses, no skin or nipple changes or axillary nodes  Pelvic - VULVA: normal appearing vulva with no masses, tenderness or lesions, VAGINA: normal appearing vagina with normal color and discharge, no lesions, CERVIX: surgically absent, UTERUS: surgically absent, vaginal cuff well healed, ADNEXA: no masses  Back exam - full range of motion, no tenderness, palpable spasm or pain on motion  Neurological - alert, oriented, normal speech, no focal findings or movement disorder noted  Musculoskeletal - no joint tenderness, deformity or swelling  Extremities - peripheral pulses normal, no pedal edema, no clubbing or cyanosis      Depression Risk Factor Screening:     PHQ over the last two weeks 5/22/2018   Little interest or pleasure in doing things Not at all   Feeling down, depressed or hopeless Not at all   Total Score PHQ 2 0     Alcohol Risk Factor Screening: You do not drink alcohol or very rarely. Functional Ability and Level of Safety:   Hearing Loss  Hearing is good. Activities of Daily Living  The home contains: no safety equipment. Patient does total self care    Fall Risk  Fall Risk Assessment, last 12 mths 5/22/2018   Able to walk? Yes   Fall in past 12 months? Yes   Fall with injury? No   Number of falls in past 12 months 1   Fall Risk Score 1       Abuse Screen  Patient is not abused    Cognitive Screening   Evaluation of Cognitive Function:  Has your family/caregiver stated any concerns about your memory: no      Patient Care Team   Patient Care Team:  Peyman Rasmussen MD as PCP - Magdalene Johnson MD (Gastroenterology)  Kenneth Urias DPM (Podiatry)  Nakia Patino MD (Neurology)  Chino Torres MD (Neurology)  Ramos Jimenez MD (Ophthalmology)    Assessment/Plan   Education and counseling provided:  Are appropriate based on today's review and evaluation  End-of-Life planning (with patient's consent)  Screening Pap and pelvic (covered once every 2 years)  Diabetes screening test    Diagnoses and all orders for this visit:    1. Mixed hyperlipidemia - Tolerating medication well. LDL goal of 100. Will check labs to be sure this is still met. Triglycerides may actually be improved due to weight loss. Will check and adjust if needed. May actually stop her fenofibrate. 2. Essential hypertension -slightly elevated blood pressure today. Will continue current meds and monitor at home. 3. Acquired hypothyroidism -appears euthyroid. Check levels and adjust medication as needed. 4. Routine general medical examination at a health care facility    5. Medicare annual wellness visit, subsequent  6. Loose stools? Related to cholecystectomy versus Metformin. At this point will discontinue her Metformin and see what her bowel movements do. If her stools remain loose would consider Colestid to help control these.   Other orders  -     CBC WITH AUTOMATED DIFF  -     METABOLIC PANEL, COMPREHENSIVE  -     LIPID PANEL  -     TSH 3RD GENERATION  -     VITAMIN D, 25 HYDROXY  -     UA/M W/RFLX CULTURE, ROUTINE        Health Maintenance Due   Topic Date Due    MEDICARE YEARLY EXAM  05/20/2018

## 2018-05-23 LAB
25(OH)D3+25(OH)D2 SERPL-MCNC: 29.2 NG/ML (ref 30–100)
ALBUMIN SERPL-MCNC: 4.5 G/DL (ref 3.6–4.8)
ALBUMIN/GLOB SERPL: 1.8 {RATIO} (ref 1.2–2.2)
ALP SERPL-CCNC: 38 IU/L (ref 39–117)
ALT SERPL-CCNC: 9 IU/L (ref 0–32)
APPEARANCE UR: CLEAR
AST SERPL-CCNC: 11 IU/L (ref 0–40)
BACTERIA #/AREA URNS HPF: ABNORMAL /[HPF]
BASOPHILS # BLD AUTO: 0.1 X10E3/UL (ref 0–0.2)
BASOPHILS NFR BLD AUTO: 1 %
BILIRUB SERPL-MCNC: 0.2 MG/DL (ref 0–1.2)
BILIRUB UR QL STRIP: NEGATIVE
BUN SERPL-MCNC: 24 MG/DL (ref 8–27)
BUN/CREAT SERPL: 24 (ref 12–28)
CALCIUM SERPL-MCNC: 9.7 MG/DL (ref 8.7–10.3)
CASTS URNS QL MICRO: ABNORMAL /LPF
CHLORIDE SERPL-SCNC: 96 MMOL/L (ref 96–106)
CHOLEST SERPL-MCNC: 215 MG/DL (ref 100–199)
CO2 SERPL-SCNC: 28 MMOL/L (ref 18–29)
COLOR UR: YELLOW
CREAT SERPL-MCNC: 1.01 MG/DL (ref 0.57–1)
EOSINOPHIL # BLD AUTO: 0.2 X10E3/UL (ref 0–0.4)
EOSINOPHIL NFR BLD AUTO: 2 %
EPI CELLS #/AREA URNS HPF: >10 /HPF
ERYTHROCYTE [DISTWIDTH] IN BLOOD BY AUTOMATED COUNT: 13.4 % (ref 12.3–15.4)
GFR SERPLBLD CREATININE-BSD FMLA CKD-EPI: 58 ML/MIN/1.73
GFR SERPLBLD CREATININE-BSD FMLA CKD-EPI: 67 ML/MIN/1.73
GLOBULIN SER CALC-MCNC: 2.5 G/DL (ref 1.5–4.5)
GLUCOSE SERPL-MCNC: 97 MG/DL (ref 65–99)
GLUCOSE UR QL: NEGATIVE
HCT VFR BLD AUTO: 37.5 % (ref 34–46.6)
HDLC SERPL-MCNC: 68 MG/DL
HGB BLD-MCNC: 12.3 G/DL (ref 11.1–15.9)
HGB UR QL STRIP: NEGATIVE
IMM GRANULOCYTES # BLD: 0 X10E3/UL (ref 0–0.1)
IMM GRANULOCYTES NFR BLD: 0 %
KETONES UR QL STRIP: NEGATIVE
LDLC SERPL CALC-MCNC: 115 MG/DL (ref 0–99)
LEUKOCYTE ESTERASE UR QL STRIP: NEGATIVE
LYMPHOCYTES # BLD AUTO: 2.9 X10E3/UL (ref 0.7–3.1)
LYMPHOCYTES NFR BLD AUTO: 32 %
MCH RBC QN AUTO: 27.8 PG (ref 26.6–33)
MCHC RBC AUTO-ENTMCNC: 32.8 G/DL (ref 31.5–35.7)
MCV RBC AUTO: 85 FL (ref 79–97)
MICRO URNS: NORMAL
MICRO URNS: NORMAL
MONOCYTES # BLD AUTO: 0.5 X10E3/UL (ref 0.1–0.9)
MONOCYTES NFR BLD AUTO: 6 %
MUCOUS THREADS URNS QL MICRO: PRESENT
NEUTROPHILS # BLD AUTO: 5.3 X10E3/UL (ref 1.4–7)
NEUTROPHILS NFR BLD AUTO: 59 %
NITRITE UR QL STRIP: NEGATIVE
PH UR STRIP: 5 [PH] (ref 5–7.5)
PLATELET # BLD AUTO: 373 X10E3/UL (ref 150–379)
POTASSIUM SERPL-SCNC: 3.8 MMOL/L (ref 3.5–5.2)
PROT SERPL-MCNC: 7 G/DL (ref 6–8.5)
PROT UR QL STRIP: NEGATIVE
RBC # BLD AUTO: 4.42 X10E6/UL (ref 3.77–5.28)
RBC #/AREA URNS HPF: ABNORMAL /HPF
SODIUM SERPL-SCNC: 140 MMOL/L (ref 134–144)
SP GR UR: 1.02 (ref 1–1.03)
T4 FREE SERPL-MCNC: 1.95 NG/DL (ref 0.82–1.77)
TRIGL SERPL-MCNC: 158 MG/DL (ref 0–149)
TSH SERPL DL<=0.005 MIU/L-ACNC: 1.71 UIU/ML (ref 0.45–4.5)
URINALYSIS REFLEX, 377202: NORMAL
UROBILINOGEN UR STRIP-MCNC: 0.2 MG/DL (ref 0.2–1)
VLDLC SERPL CALC-MCNC: 32 MG/DL (ref 5–40)
WBC # BLD AUTO: 9 X10E3/UL (ref 3.4–10.8)
WBC #/AREA URNS HPF: ABNORMAL /HPF

## 2018-06-03 RX ORDER — METFORMIN HYDROCHLORIDE 500 MG/1
500 TABLET, EXTENDED RELEASE ORAL
Qty: 90 TAB | Refills: 1 | Status: SHIPPED | OUTPATIENT
Start: 2018-06-03 | End: 2018-11-26 | Stop reason: SDUPTHER

## 2018-06-03 RX ORDER — FENOFIBRATE 145 MG/1
TABLET, COATED ORAL
Qty: 90 TAB | Refills: 0 | Status: SHIPPED | OUTPATIENT
Start: 2018-06-03 | End: 2018-09-05 | Stop reason: SDUPTHER

## 2018-07-13 RX ORDER — CHLORTHALIDONE 50 MG/1
TABLET ORAL
Qty: 90 TAB | Refills: 0 | Status: SHIPPED | OUTPATIENT
Start: 2018-07-13 | End: 2018-09-23 | Stop reason: SDUPTHER

## 2018-07-13 RX ORDER — LEVOTHYROXINE SODIUM 112 UG/1
TABLET ORAL
Qty: 90 TAB | Refills: 0 | Status: SHIPPED | OUTPATIENT
Start: 2018-07-13 | End: 2019-01-10 | Stop reason: SDUPTHER

## 2018-07-27 NOTE — TELEPHONE ENCOUNTER
Orders Placed This Encounter    varicella-zoster recombinant, PF, (SHINGRIX, PF,) 50 mcg/0.5 mL susr injection     Si.5 mL by IntraMUSCular route once for 1 dose. Dispense:  0.5 mL     Refill:  1     The above medication refills were approved via verbal order by Dr. Radha Sanford.

## 2018-09-05 RX ORDER — FENOFIBRATE 145 MG/1
TABLET, COATED ORAL
Qty: 90 TAB | Refills: 0 | Status: SHIPPED | OUTPATIENT
Start: 2018-09-05 | End: 2018-11-26 | Stop reason: SDUPTHER

## 2018-09-23 RX ORDER — CHLORTHALIDONE 50 MG/1
TABLET ORAL
Qty: 90 TAB | Refills: 0 | Status: SHIPPED | OUTPATIENT
Start: 2018-09-23 | End: 2018-12-27 | Stop reason: SDUPTHER

## 2018-09-25 RX ORDER — ROPINIROLE 1 MG/1
TABLET, FILM COATED ORAL
Qty: 60 TAB | Refills: 5 | Status: SHIPPED | OUTPATIENT
Start: 2018-09-25 | End: 2019-04-09 | Stop reason: SDUPTHER

## 2018-11-07 RX ORDER — ESTROGENS, CONJUGATED 0.62 MG/1
TABLET, FILM COATED ORAL
Qty: 90 TAB | Refills: 1 | Status: SHIPPED | OUTPATIENT
Start: 2018-11-07 | End: 2019-04-27 | Stop reason: SDUPTHER

## 2018-11-26 RX ORDER — METFORMIN HYDROCHLORIDE 500 MG/1
TABLET, EXTENDED RELEASE ORAL
Qty: 90 TAB | Refills: 1 | Status: SHIPPED | OUTPATIENT
Start: 2018-11-26 | End: 2019-05-24 | Stop reason: SDUPTHER

## 2018-11-26 RX ORDER — FENOFIBRATE 145 MG/1
TABLET, COATED ORAL
Qty: 90 TAB | Refills: 0 | Status: SHIPPED | OUTPATIENT
Start: 2018-11-26 | End: 2019-02-23 | Stop reason: SDUPTHER

## 2018-12-27 RX ORDER — CHLORTHALIDONE 50 MG/1
TABLET ORAL
Qty: 90 TAB | Refills: 0 | Status: SHIPPED | OUTPATIENT
Start: 2018-12-27 | End: 2019-03-12 | Stop reason: SDUPTHER

## 2019-01-10 RX ORDER — LEVOTHYROXINE SODIUM 112 UG/1
TABLET ORAL
Qty: 90 TAB | Refills: 0 | Status: SHIPPED | OUTPATIENT
Start: 2019-01-10 | End: 2019-03-12 | Stop reason: SDUPTHER

## 2019-02-24 RX ORDER — FENOFIBRATE 145 MG/1
TABLET, COATED ORAL
Qty: 90 TAB | Refills: 0 | Status: SHIPPED | OUTPATIENT
Start: 2019-02-24 | End: 2019-05-24 | Stop reason: SDUPTHER

## 2019-03-12 RX ORDER — LEVOTHYROXINE SODIUM 112 UG/1
TABLET ORAL
Qty: 90 TAB | Refills: 0 | Status: SHIPPED | OUTPATIENT
Start: 2019-03-12 | End: 2019-06-06 | Stop reason: SDUPTHER

## 2019-03-12 RX ORDER — CHLORTHALIDONE 50 MG/1
TABLET ORAL
Qty: 90 TAB | Refills: 0 | Status: SHIPPED | OUTPATIENT
Start: 2019-03-12 | End: 2019-06-06 | Stop reason: SDUPTHER

## 2019-04-09 RX ORDER — ROPINIROLE 1 MG/1
TABLET, FILM COATED ORAL
Qty: 60 TAB | Refills: 5 | Status: SHIPPED | OUTPATIENT
Start: 2019-04-09 | End: 2019-09-05 | Stop reason: SDUPTHER

## 2019-04-28 RX ORDER — ESTROGENS, CONJUGATED 0.62 MG/1
TABLET, FILM COATED ORAL
Qty: 90 TAB | Refills: 1 | Status: SHIPPED | OUTPATIENT
Start: 2019-04-28 | End: 2019-09-05 | Stop reason: SDUPTHER

## 2019-05-24 RX ORDER — FENOFIBRATE 145 MG/1
TABLET, COATED ORAL
Qty: 90 TAB | Refills: 0 | Status: SHIPPED | OUTPATIENT
Start: 2019-05-24 | End: 2019-08-21 | Stop reason: SDUPTHER

## 2019-05-24 RX ORDER — METFORMIN HYDROCHLORIDE 500 MG/1
TABLET, EXTENDED RELEASE ORAL
Qty: 90 TAB | Refills: 1 | Status: SHIPPED | OUTPATIENT
Start: 2019-05-24 | End: 2019-09-05 | Stop reason: SDUPTHER

## 2019-06-06 RX ORDER — LEVOTHYROXINE SODIUM 112 UG/1
TABLET ORAL
Qty: 90 TAB | Refills: 0 | Status: SHIPPED | OUTPATIENT
Start: 2019-06-06 | End: 2019-09-05 | Stop reason: SDUPTHER

## 2019-06-06 RX ORDER — CHLORTHALIDONE 50 MG/1
TABLET ORAL
Qty: 90 TAB | Refills: 0 | Status: SHIPPED | OUTPATIENT
Start: 2019-06-06 | End: 2019-09-05 | Stop reason: SDUPTHER

## 2019-06-10 ENCOUNTER — HOSPITAL ENCOUNTER (OUTPATIENT)
Dept: LAB | Age: 69
Discharge: HOME | End: 2019-06-10
Payer: MEDICARE

## 2019-06-10 ENCOUNTER — OFFICE VISIT (OUTPATIENT)
Dept: INTERNAL MEDICINE CLINIC | Age: 69
End: 2019-06-10

## 2019-06-10 VITALS
BODY MASS INDEX: 35.57 KG/M2 | TEMPERATURE: 98.3 F | HEIGHT: 61 IN | SYSTOLIC BLOOD PRESSURE: 136 MMHG | RESPIRATION RATE: 18 BRPM | DIASTOLIC BLOOD PRESSURE: 81 MMHG | WEIGHT: 188.38 LBS | OXYGEN SATURATION: 94 % | HEART RATE: 74 BPM

## 2019-06-10 DIAGNOSIS — Z00.00 MEDICARE ANNUAL WELLNESS VISIT, SUBSEQUENT: Primary | ICD-10-CM

## 2019-06-10 DIAGNOSIS — E66.01 SEVERE OBESITY (HCC): ICD-10-CM

## 2019-06-10 DIAGNOSIS — E78.2 MIXED HYPERLIPIDEMIA: ICD-10-CM

## 2019-06-10 DIAGNOSIS — I10 ESSENTIAL HYPERTENSION: ICD-10-CM

## 2019-06-10 DIAGNOSIS — E03.9 ACQUIRED HYPOTHYROIDISM: ICD-10-CM

## 2019-06-10 PROCEDURE — 80061 LIPID PANEL: CPT

## 2019-06-10 PROCEDURE — 36415 COLL VENOUS BLD VENIPUNCTURE: CPT

## 2019-06-10 PROCEDURE — 80053 COMPREHEN METABOLIC PANEL: CPT

## 2019-06-10 PROCEDURE — 85025 COMPLETE CBC W/AUTO DIFF WBC: CPT

## 2019-06-10 PROCEDURE — 84443 ASSAY THYROID STIM HORMONE: CPT

## 2019-06-10 RX ORDER — CLONIDINE HYDROCHLORIDE 0.1 MG/1
0.1 TABLET ORAL
Qty: 30 TAB | Refills: 3 | Status: SHIPPED | OUTPATIENT
Start: 2019-06-10 | End: 2019-09-05 | Stop reason: SDUPTHER

## 2019-06-10 NOTE — PATIENT INSTRUCTIONS
Kegel Exercises: Care Instructions Your Care Instructions Kegel exercises strengthen muscles around the bladder. These muscles control the flow of urine. Kegel exercises are sometime called \"pelvic floor\" exercises. They can help prevent urine leakage and keep the pelvic organs in place. A woman who just had a baby might want to try Kegel exercises. They can strengthen pelvic muscles that have been weakened by pregnancy and childbirth. A man or woman may use Kegel exercises to treat urine leakage. You do Kegel exercises by tightening the muscles you use when you urinate. You will likely need to do these exercises for several weeks to get better. Follow-up care is a key part of your treatment and safety. Be sure to make and go to all appointments, and call your doctor if you are having problems. It's also a good idea to know your test results and keep a list of the medicines you take. How can you care for yourself at home? · Do Kegel exercises. ? Find the muscles you need to strengthen. To do this, tighten the muscles that stop your urine while you are going to the bathroom. These are the same muscles you squeeze during Kegel exercises. ? Squeeze the muscles as hard as you can. Your belly and thighs should not move. ? Hold the squeeze for 3 seconds. Then relax for 3 seconds. ? Start with 3 seconds, and then add 1 second each week until you are able to squeeze for 10 seconds. ? Repeat the exercise 10 to 15 times for each session. Do three or more sessions each day. · You can check to see if you are using the right muscles. Place a finger in your vagina and squeeze around it. You are doing them right when you feel pressure around your finger. Your doctor may also suggest that you put special weights in your vagina while you do the exercises. · Do not smoke. It can irritate the bladder. If you need help quitting, talk to your doctor about stop-smoking programs and medicines.  These can increase your chances of quitting for good. Where can you learn more? Go to http://malathi-alana.info/. Enter P741 in the search box to learn more about \"Kegel Exercises: Care Instructions. \" Current as of: March 20, 2018 Content Version: 11.9 © 3305-2022 [employer]. Care instructions adapted under license by [employer] (which disclaims liability or warranty for this information). If you have questions about a medical condition or this instruction, always ask your healthcare professional. [address] any warranty or liability for your use of this information. Medicare Wellness Visit, Female The best way to live healthy is to have a lifestyle where you eat a well-balanced diet, exercise regularly, limit alcohol use, and quit all forms of tobacco/nicotine, if applicable. Regular preventive services are another way to keep healthy. Preventive services (vaccines, screening tests, monitoring & exams) can help personalize your care plan, which helps you manage your own care. Screening tests can find health problems at the earliest stages, when they are easiest to treat. [facility] follows the current, evidence-based guidelines published by the [place] (USPSTF) when recommending preventive services for our patients. Because we follow these guidelines, sometimes recommendations change over time as research supports it. (For example, mammograms used to be recommended annually. Even though Medicare will still pay for an annual mammogram, the newer guidelines recommend a mammogram every two years for women of average risk.) Of course, you and your doctor may decide to screen more often for some diseases, based on your risk and your health status. Preventive services for you include: - Medicare offers their members a free annual wellness visit, which is time for you and your primary care provider to discuss and plan for your preventive service needs. Take advantage of this benefit every year! 
-All adults over the age of 72 should receive the recommended pneumonia vaccines. Current USPSTF guidelines recommend a series of two vaccines for the best pneumonia protection.  
-All adults should have a flu vaccine yearly and a tetanus vaccine every 10 years. All adults age 61 and older should receive a shingles vaccine once in their lifetime.   
-A bone mass density test is recommended when a woman turns 65 to screen for osteoporosis. This test is only recommended one time, as a screening. Some providers will use this same test as a disease monitoring tool if you already have osteoporosis. -All adults age 38-68 who are overweight should have a diabetes screening test once every three years.  
-Other screening tests and preventive services for persons with diabetes include: an eye exam to screen for diabetic retinopathy, a kidney function test, a foot exam, and stricter control over your cholesterol.  
-Cardiovascular screening for adults with routine risk involves an electrocardiogram (ECG) at intervals determined by your doctor.  
-Colorectal cancer screenings should be done for adults age 54-65 with no increased risk factors for colorectal cancer. There are a number of acceptable methods of screening for this type of cancer. Each test has its own benefits and drawbacks. Discuss with your doctor what is most appropriate for you during your annual wellness visit. The different tests include: colonoscopy (considered the best screening method), a fecal occult blood test, a fecal DNA test, and sigmoidoscopy.  
-Breast cancer screenings are recommended every other year for women of normal risk, age 54-69. 
-Cervical cancer screenings for women over age 72 are only recommended with certain risk factors.  
-All adults born between [birth year] and 1964 should be screened once for Hepatitis C. Here is a list of your current Health Maintenance items (your personalized list of preventive services) with a due date: 
Health Maintenance Due Topic Date Due  
 Annual Well Visit  05/23/2019 Medicare Wellness Visit, Female The best way to live healthy is to have a lifestyle where you eat a well-balanced diet, exercise regularly, limit alcohol use, and quit all forms of tobacco/nicotine, if applicable. Regular preventive services are another way to keep healthy. Preventive services (vaccines, screening tests, monitoring & exams) can help personalize your care plan, which helps you manage your own care. Screening tests can find health problems at the earliest stages, when they are easiest to treat. [facility] follows the current, evidence-based guidelines published by the [facility] [place] ([facility]) when recommending preventive services for our patients. Because we follow these guidelines, sometimes recommendations change over time as research supports it. (For example, mammograms used to be recommended annually. Even though Medicare will still pay for an annual mammogram, the newer guidelines recommend a mammogram every two years for women of average risk.) Of course, you and your doctor may decide to screen more often for some diseases, based on your risk and your health status. Preventive services for you include: - Medicare offers their members a free annual wellness visit, which is time for you and your primary care provider to discuss and plan for your preventive service needs. Take advantage of this benefit every year! 
-All adults over the age of 72 should receive the recommended pneumonia vaccines. Current USPSTF guidelines recommend a series of two vaccines for the best pneumonia protection.  
-All adults should have a flu vaccine yearly and a tetanus vaccine every 10 years.  All adults age 61 and older should receive a shingles vaccine once in their lifetime.   
-A bone mass density test is recommended when a woman turns 65 to screen for osteoporosis. This test is only recommended one time, as a screening. Some providers will use this same test as a disease monitoring tool if you already have osteoporosis. -All adults age 38-68 who are overweight should have a diabetes screening test once every three years.  
-Other screening tests and preventive services for persons with diabetes include: an eye exam to screen for diabetic retinopathy, a kidney function test, a foot exam, and stricter control over your cholesterol.  
-Cardiovascular screening for adults with routine risk involves an electrocardiogram (ECG) at intervals determined by your doctor.  
-Colorectal cancer screenings should be done for adults age 54-65 with no increased risk factors for colorectal cancer. There are a number of acceptable methods of screening for this type of cancer. Each test has its own benefits and drawbacks. Discuss with your doctor what is most appropriate for you during your annual wellness visit. The different tests include: colonoscopy (considered the best screening method), a fecal occult blood test, a fecal DNA test, and sigmoidoscopy. -Breast cancer screenings are recommended every other year for women of normal risk, age 54-69. 
-Cervical cancer screenings for women over age 72 are only recommended with certain risk factors.  
-All adults born between [facility] should be screened once for Hepatitis C. Here is a list of your current Health Maintenance items (your personalized list of preventive services) with a due date: 
Health Maintenance Due Topic Date Due  
 Annual Well Visit  05/23/2019

## 2019-06-10 NOTE — PROGRESS NOTES
This is the Subsequent Medicare Annual Wellness Exam, performed 12 months or more after the Initial AWV or the last Subsequent AWV    I have reviewed the patient's medical history in detail and updated the computerized patient record. As well as a follow-up of her health issues. She is recently had a problem with her Achilles tendon and it is getting better slowly with rehab at home. She is currently tolerating her medications well. Blood pressures been under good control. Her weight continues to be a problem and she is trying to work on this with diet and exercise. Her exercise is chest pains or shortness of breath. Denies any cough or wheeze. Denies any change in bowel or bladder habits.   History     Past Medical History:   Diagnosis Date    Cancer ([address]     skin    Chronic pain     back - PT    Diabetes ([facility] [address]     Hypercholesterolemia     Hypertension     Ill-defined condition     N/V    Ill-defined condition     right eye scheduled for cataract removed 2017    Thyroid disease       Past Surgical History:   Procedure Laterality Date    BREAST SURGERY PROCEDURE UNLISTED  ,3/2007,     breast biopsy - all benign    HX CATARACT REMOVAL Left     HX CATARACT REMOVAL Right 2018    HX COLONOSCOPY  16    Dr. Yana Yin GYN       x 2, hysterectomy    HX LAP CHOLECYSTECTOMY  2017    Momin    HX MALIGNANT SKIN LESION EXCISION  2012    skin cancer from nose - BCCA per pt     Current Outpatient Medications   Medication Sig Dispense Refill    chlorthalidone (HYGROTEN) 50 mg tablet TAKE 1 TABLET BY MOUTH ONCE DAILY 90 Tab 0    levothyroxine (SYNTHROID) 112 mcg tablet TAKE 1 TABLET BY MOUTH ONCE DAILY BEFORE BREAKFAST OVERDUE  FOR  APPT 90 Tab 0    fenofibrate nanocrystallized (TRICOR) 145 mg tablet TAKE 1 TABLET BY MOUTH ONCE DAILY 90 Tab 0    metFORMIN ER (GLUCOPHAGE XR) 500 mg tablet TAKE 1 TABLET BY MOUTH ONCE DAILY WITH SUPPER 90 Tab 1    PREMARIN 0.625 mg tablet TAKE 1 TABLET BY MOUTH ONCE DAILY 90 Tab 1    rOPINIRole (REQUIP) 1 mg tablet TAKE 1 TABLET BY MOUTH TWICE DAILY 60 Tab 5    ketoconazole (NIZORAL) 2 % topical cream       Cholecalciferol, Vitamin D3, (VITAMIN D3) 2,000 unit cap capsule Take 2,000 Units by mouth daily. 90 Cap 0    vitamin E (AQUA GEMS) 400 unit capsule Take 400 Units by mouth daily.  GLUCOSAMINE/CHONDROITIN SULF A (GLUCOSAMINE-CHONDROITIN PO) Take  by mouth. 1500mg/1200mg per 2 pills. Takes one pill once daily.  loratadine (ALLERCLEAR) 10 mg tablet Take 10 mg by mouth daily as needed for Allergies.        Allergies   Allergen Reactions    Codeine Other (comments)     Hallucination/room spinning    Sudafed [Pseudoephedrine Hcl] Palpitations    Darvocet A500 [Propoxyphene N-Acetaminophen] Nausea and Vomiting     Family History   Problem Relation Age of Onset    Heart Disease Father         CAD, CABG    Diabetes Father         insulin    Hypertension Mother    [facility] Cancer Mother         nose - skin - BCCA    Hypertension Brother     Hypertension Brother     Other Brother         PVD    Kidney Disease Brother         dialysis    Diabetes Brother         insulin     Social History     Tobacco Use    Smoking status: Never Smoker    Smokeless tobacco: Never Used   Substance Use Topics    Alcohol use: Yes     Comment: maybe once a year - has a sip     Patient Active Problem List   Diagnosis Code    Back pain M54.9    Essential hypertension I10    Acquired hypothyroidism E03.9    Advance directive discussed with patient Z70.80    Mixed hyperlipidemia E78.2    Neuropathic pain of both feet G57.93    Severe obesity ([facility] [address] E66.01   ROS - Per HPI  Physical Examination: General appearance - alert, well appearing, and in no distress  Eyes - pupils equal and reactive, extraocular eye movements intact  Ears - bilateral TM's and external ear canals normal  Nose - normal and patent, no erythema, discharge or polyps  Mouth - mucous membranes moist, pharynx normal without lesions  Neck - supple, no significant adenopathy  Lymphatics - no palpable lymphadenopathy, no hepatosplenomegaly  Chest - clear to auscultation, no wheezes, rales or rhonchi, symmetric air entry  Heart - normal rate, regular rhythm, normal S1, S2, no murmurs, rubs, clicks or gallops  Abdomen - soft, nontender, nondistended, no masses or organomegaly  Back exam - full range of motion, no tenderness, palpable spasm or pain on motion  Neurological - alert, oriented, normal speech, no focal findings or movement disorder noted  Musculoskeletal - no joint tenderness, deformity or swelling  Extremities - peripheral pulses normal, no pedal edema, no clubbing or cyanosis      Depression Risk Factor Screening:     3 most recent PHQ Screens 6/10/2019   Little interest or pleasure in doing things Not at all   Feeling down, depressed, irritable, or hopeless Not at all   Total Score PHQ 2 0     Alcohol Risk Factor Screening: You do not drink alcohol or very rarely. Functional Ability and Level of Safety:   Hearing Loss  Hearing is good. Activities of Daily Living  The home contains: no safety equipment. Patient does total self care    Fall Risk  Fall Risk Assessment, last 12 mths 6/10/2019   Able to walk? Yes   Fall in past 12 months?  No   Fall with injury? -   Number of falls in past 12 months -   Fall Risk Score -       Abuse Screen  Patient is not abused    Cognitive Screening   Evaluation of Cognitive Function:  Has your family/caregiver stated any concerns about your memory: no      Patient Care Team   Patient Care Team:  Gulshan Hilton MD as PCP - Shalonda Castro MD (Gastroenterology)  Rober Singh DPM (Podiatry)  Jessica Metz MD (Neurology)  Bernardino Gilford, MD (Neurology)  Cinthya Win MD (Ophthalmology)    Assessment/Plan   Education and counseling provided:  Are appropriate based on today's review and evaluation  End-of-Life planning (with patient's consent)  Screening Mammography  Diabetes screening test    Diagnoses and all orders for this visit:    1. Essential hypertension-blood pressure well controlled. Will continue current medication she is tolerating well. -     CBC WITH AUTOMATED DIFF  -     METABOLIC PANEL, COMPREHENSIVE    2. Severe obesity (HCC)-if labs are stable, consider increasing metformin to 2 a day to help with weight loss. Strongly encouraged her to seek a pool therapy program to help with exercise and her ankle. 3. Acquired hypothyroidism-appears euthyroid. Check levels and adjust medications for that.  -     TSH AND FREE T4    4. Mixed hyperlipidemia-LDL goal of 100. Diet controlled currently. We will see where she is on medications.  -     LIPID PANEL    5. Medicare annual wellness visit, subsequent  6. Hot flashesproblematic for her at night. Will try low-dose clonidine and see if it is helpful.       Health Maintenance Due   Topic Date Due    MEDICARE YEARLY EXAM  05/23/2019

## 2019-06-11 LAB
ALBUMIN SERPL-MCNC: 4.2 G/DL (ref 3.6–4.8)
ALBUMIN/GLOB SERPL: 1.7 {RATIO} (ref 1.2–2.2)
ALP SERPL-CCNC: 35 IU/L (ref 39–117)
ALT SERPL-CCNC: 11 IU/L (ref 0–32)
AST SERPL-CCNC: 15 IU/L (ref 0–40)
BASOPHILS # BLD AUTO: 0.1 X10E3/UL (ref 0–0.2)
BASOPHILS NFR BLD AUTO: 1 %
BILIRUB SERPL-MCNC: 0.3 MG/DL (ref 0–1.2)
BUN SERPL-MCNC: 22 MG/DL (ref 8–27)
BUN/CREAT SERPL: 25 (ref 12–28)
CALCIUM SERPL-MCNC: 9.2 MG/DL (ref 8.7–10.3)
CHLORIDE SERPL-SCNC: 101 MMOL/L (ref 96–106)
CHOLEST SERPL-MCNC: 207 MG/DL (ref 100–199)
CO2 SERPL-SCNC: 26 MMOL/L (ref 20–29)
CREAT SERPL-MCNC: 0.88 MG/DL (ref 0.57–1)
EOSINOPHIL # BLD AUTO: 0.2 X10E3/UL (ref 0–0.4)
EOSINOPHIL NFR BLD AUTO: 3 %
ERYTHROCYTE [DISTWIDTH] IN BLOOD BY AUTOMATED COUNT: 14.1 % (ref 12.3–15.4)
GLOBULIN SER CALC-MCNC: 2.5 G/DL (ref 1.5–4.5)
GLUCOSE SERPL-MCNC: 100 MG/DL (ref 65–99)
HCT VFR BLD AUTO: 37.3 % (ref 34–46.6)
HDLC SERPL-MCNC: 54 MG/DL
HGB BLD-MCNC: 12.4 G/DL (ref 11.1–15.9)
IMM GRANULOCYTES # BLD AUTO: 0 X10E3/UL (ref 0–0.1)
IMM GRANULOCYTES NFR BLD AUTO: 0 %
LDLC SERPL CALC-MCNC: 107 MG/DL (ref 0–99)
LYMPHOCYTES # BLD AUTO: 2.5 X10E3/UL (ref 0.7–3.1)
LYMPHOCYTES NFR BLD AUTO: 31 %
MCH RBC QN AUTO: 28.3 PG (ref 26.6–33)
MCHC RBC AUTO-ENTMCNC: 33.2 G/DL (ref 31.5–35.7)
MCV RBC AUTO: 85 FL (ref 79–97)
MONOCYTES # BLD AUTO: 0.4 X10E3/UL (ref 0.1–0.9)
MONOCYTES NFR BLD AUTO: 5 %
NEUTROPHILS # BLD AUTO: 4.9 X10E3/UL (ref 1.4–7)
NEUTROPHILS NFR BLD AUTO: 60 %
PLATELET # BLD AUTO: 312 X10E3/UL (ref 150–450)
POTASSIUM SERPL-SCNC: 3.7 MMOL/L (ref 3.5–5.2)
PROT SERPL-MCNC: 6.7 G/DL (ref 6–8.5)
RBC # BLD AUTO: 4.38 X10E6/UL (ref 3.77–5.28)
SODIUM SERPL-SCNC: 143 MMOL/L (ref 134–144)
T4 FREE SERPL-MCNC: 1.86 NG/DL (ref 0.82–1.77)
TRIGL SERPL-MCNC: 231 MG/DL (ref 0–149)
TSH SERPL DL<=0.005 MIU/L-ACNC: 1.17 UIU/ML (ref 0.45–4.5)
VLDLC SERPL CALC-MCNC: 46 MG/DL (ref 5–40)
WBC # BLD AUTO: 8.1 X10E3/UL (ref 3.4–10.8)

## 2019-08-21 RX ORDER — FENOFIBRATE 145 MG/1
TABLET, COATED ORAL
Qty: 90 TAB | Refills: 0 | Status: SHIPPED | OUTPATIENT
Start: 2019-08-21 | End: 2019-09-05 | Stop reason: SDUPTHER

## 2019-09-05 RX ORDER — ROPINIROLE 1 MG/1
1 TABLET, FILM COATED ORAL 2 TIMES DAILY
Qty: 10 TAB | Refills: 0 | Status: SHIPPED | OUTPATIENT
Start: 2019-09-05 | End: 2020-01-24 | Stop reason: SDUPTHER

## 2019-09-05 RX ORDER — FENOFIBRATE 145 MG/1
145 TABLET, COATED ORAL DAILY
Qty: 10 TAB | Refills: 0 | Status: SHIPPED | OUTPATIENT
Start: 2019-09-05 | End: 2020-05-21

## 2019-09-05 RX ORDER — CHLORTHALIDONE 50 MG/1
50 TABLET ORAL DAILY
Qty: 10 TAB | Refills: 0 | Status: SHIPPED | OUTPATIENT
Start: 2019-09-05 | End: 2020-02-14

## 2019-09-05 RX ORDER — CLONIDINE HYDROCHLORIDE 0.1 MG/1
0.1 TABLET ORAL
Qty: 30 TAB | Refills: 3 | Status: SHIPPED | OUTPATIENT
Start: 2019-09-05 | End: 2020-02-09

## 2019-09-05 RX ORDER — METFORMIN HYDROCHLORIDE 500 MG/1
500 TABLET, EXTENDED RELEASE ORAL
Qty: 10 TAB | Refills: 0 | Status: SHIPPED | OUTPATIENT
Start: 2019-09-05 | End: 2020-03-19

## 2019-09-05 RX ORDER — LEVOTHYROXINE SODIUM 112 UG/1
112 TABLET ORAL
Qty: 10 TAB | Refills: 0 | Status: SHIPPED | OUTPATIENT
Start: 2019-09-05 | End: 2020-02-14

## 2019-09-05 NOTE — TELEPHONE ENCOUNTER
Orders Placed This Encounter    chlorthalidone (HYGROTEN) 50 mg tablet     Sig: Take 1 Tab by mouth daily. Dispense:  10 Tab     Refill:  0    cloNIDine HCl (CATAPRES) 0.1 mg tablet     Sig: Take 1 Tab by mouth nightly. Dispense:  30 Tab     Refill:  3    fenofibrate nanocrystallized (TRICOR) 145 mg tablet     Sig: Take 1 Tab by mouth daily. Dispense:  10 Tab     Refill:  0    levothyroxine (SYNTHROID) 112 mcg tablet     Sig: Take 1 Tab by mouth Daily (before breakfast). Dispense:  10 Tab     Refill:  0    metFORMIN ER (GLUCOPHAGE XR) 500 mg tablet     Sig: Take 1 Tab by mouth daily (with dinner). Dispense:  10 Tab     Refill:  0    conjugated estrogens (PREMARIN) 0.625 mg tablet     Sig: Take 1 Tab by mouth daily. Dispense:  10 Tab     Refill:  0    rOPINIRole (REQUIP) 1 mg tablet     Sig: Take 1 Tab by mouth two (2) times a day. Dispense:  10 Tab     Refill:  0     Please consider 90 day supplies to promote better adherence     The above orders were approved via [facility] per Dr. Violet Apple, III.

## 2019-09-05 NOTE — TELEPHONE ENCOUNTER
Pt in [place] visiting her son & hurt her ankle- seeing a specialist & getting an MRI there. Pt wasn't planning on being away from home for so long and will be out of meds soon. Pt asking for 2-3 days worth to be sent to [facility] on file in [place].   Please advise when done

## 2019-09-16 RX ORDER — CHLORTHALIDONE 50 MG/1
TABLET ORAL
Qty: 90 TAB | Refills: 0 | Status: SHIPPED | OUTPATIENT
Start: 2019-09-16 | End: 2019-12-22

## 2019-09-27 RX ORDER — LEVOTHYROXINE SODIUM 112 UG/1
112 TABLET ORAL
Qty: 10 TAB | Refills: 0 | OUTPATIENT
Start: 2019-09-27

## 2019-09-27 RX ORDER — LEVOTHYROXINE SODIUM 112 UG/1
TABLET ORAL
Qty: 90 TAB | Refills: 0 | Status: SHIPPED | OUTPATIENT
Start: 2019-09-27 | End: 2019-12-22

## 2019-09-27 NOTE — TELEPHONE ENCOUNTER
Patient is leaving the country Monday -- just got back in town from another trip last night. Asked if this could please be done today.

## 2019-10-21 RX ORDER — ROPINIROLE 1 MG/1
TABLET, FILM COATED ORAL
Qty: 60 TAB | Refills: 5 | Status: SHIPPED | OUTPATIENT
Start: 2019-10-21 | End: 2020-05-21

## 2019-10-21 RX ORDER — ESTROGENS, CONJUGATED 0.62 MG/1
TABLET, FILM COATED ORAL
Qty: 90 TAB | Refills: 1 | Status: SHIPPED | OUTPATIENT
Start: 2019-10-21 | End: 2020-01-24 | Stop reason: SDUPTHER

## 2019-11-18 RX ORDER — CLONIDINE HYDROCHLORIDE 0.1 MG/1
TABLET ORAL
Qty: 30 TAB | Refills: 3 | Status: SHIPPED | OUTPATIENT
Start: 2019-11-18 | End: 2020-01-24 | Stop reason: SDUPTHER

## 2019-11-18 RX ORDER — METFORMIN HYDROCHLORIDE 500 MG/1
TABLET, EXTENDED RELEASE ORAL
Qty: 90 TAB | Refills: 1 | Status: SHIPPED | OUTPATIENT
Start: 2019-11-18 | End: 2020-01-24 | Stop reason: SDUPTHER

## 2019-11-18 RX ORDER — FENOFIBRATE 145 MG/1
TABLET, COATED ORAL
Qty: 90 TAB | Refills: 0 | Status: SHIPPED | OUTPATIENT
Start: 2019-11-18 | End: 2020-02-14

## 2019-12-22 RX ORDER — LEVOTHYROXINE SODIUM 112 UG/1
TABLET ORAL
Qty: 90 TAB | Refills: 0 | Status: SHIPPED | OUTPATIENT
Start: 2019-12-22 | End: 2020-01-24 | Stop reason: SDUPTHER

## 2019-12-22 RX ORDER — CHLORTHALIDONE 50 MG/1
TABLET ORAL
Qty: 90 TAB | Refills: 0 | Status: SHIPPED | OUTPATIENT
Start: 2019-12-22 | End: 2020-01-24 | Stop reason: SDUPTHER

## 2020-01-24 ENCOUNTER — OFFICE VISIT (OUTPATIENT)
Dept: INTERNAL MEDICINE CLINIC | Age: 70
End: 2020-01-24

## 2020-01-24 VITALS
HEART RATE: 66 BPM | DIASTOLIC BLOOD PRESSURE: 80 MMHG | OXYGEN SATURATION: 98 % | RESPIRATION RATE: 16 BRPM | SYSTOLIC BLOOD PRESSURE: 140 MMHG | TEMPERATURE: 97.6 F | BODY MASS INDEX: 34.17 KG/M2 | HEIGHT: 61 IN | WEIGHT: 181 LBS

## 2020-01-24 DIAGNOSIS — I10 ESSENTIAL HYPERTENSION: ICD-10-CM

## 2020-01-24 DIAGNOSIS — H02.409 PTOSIS DUE TO AGING: Primary | ICD-10-CM

## 2020-01-24 DIAGNOSIS — E03.9 ACQUIRED HYPOTHYROIDISM: ICD-10-CM

## 2020-01-24 DIAGNOSIS — E78.2 MIXED HYPERLIPIDEMIA: ICD-10-CM

## 2020-01-24 NOTE — PROGRESS NOTES
Preoperative Evaluation    Date of Exam: 2020    Lashell Cardona is a 72 y.o. female (:1950) who presents for preoperative evaluation. Procedure/Surgery:Bilateral Eyelid Surgery  Date of Procedure/Surgery: 2020  Surgeon: Jonathan Jason MD  Hospital/Surgical Facility: [facility]  Primary Physician: Santa Lopes MD  Latex Allergy: no    Problem List:     Patient Active Problem List    Diagnosis Date Noted    Severe obesity ([facility] 06/10/2019    Neuropathic pain of both feet 2016    Mixed hyperlipidemia 2016    Advance directive discussed with patient 2016    Essential hypertension 2010    Acquired hypothyroidism 2010    Back pain 2010     Medical History:     Past Medical History:   Diagnosis Date    Cancer ([facility]     skin    Chronic pain     back - PT    Diabetes ([facility]     Hypercholesterolemia     Hypertension     Ill-defined condition     N/V    Ill-defined condition     right eye scheduled for cataract removed 2017    Thyroid disease      Allergies: Allergies   Allergen Reactions    Codeine Other (comments)     Hallucination/room spinning    Sudafed [Pseudoephedrine Hcl] Palpitations    Darvocet A500 [Propoxyphene N-Acetaminophen] Nausea and Vomiting      Medications:     Current Outpatient Medications   Medication Sig    fenofibrate nanocrystallized (TRICOR) 145 mg tablet TAKE 1 TABLET BY MOUTH ONCE DAILY    rOPINIRole (REQUIP) 1 mg tablet TAKE 1 TABLET BY MOUTH TWICE DAILY    chlorthalidone (HYGROTEN) 50 mg tablet Take 1 Tab by mouth daily.  cloNIDine HCl (CATAPRES) 0.1 mg tablet Take 1 Tab by mouth nightly.  levothyroxine (SYNTHROID) 112 mcg tablet Take 1 Tab by mouth Daily (before breakfast).  metFORMIN ER (GLUCOPHAGE XR) 500 mg tablet Take 1 Tab by mouth daily (with dinner).  conjugated estrogens (PREMARIN) 0.625 mg tablet Take 1 Tab by mouth daily.     ketoconazole (NIZORAL) 2 % topical cream  Cholecalciferol, Vitamin D3, (VITAMIN D3) 2,000 unit cap capsule Take 2,000 Units by mouth daily.  vitamin E (AQUA GEMS) 400 unit capsule Take 400 Units by mouth daily.  GLUCOSAMINE/CHONDROITIN SULF A (GLUCOSAMINE-CHONDROITIN PO) Take  by mouth. 1500mg/1200mg per 2 pills. Takes one pill once daily.  loratadine (ALLERCLEAR) 10 mg tablet Take 10 mg by mouth daily as needed for Allergies.  fenofibrate nanocrystallized (TRICOR) 145 mg tablet Take 1 Tab by mouth daily. No current facility-administered medications for this visit.       Surgical History:     Past Surgical History:   Procedure Laterality Date    BREAST SURGERY PROCEDURE UNLISTED  ,3/2007,     breast biopsy - all benign    HX CATARACT REMOVAL Left     HX CATARACT REMOVAL Right 2018    HX COLONOSCOPY  16    Dr. Devyn Aquino GYN       x 2, hysterectomy    HX LAP CHOLECYSTECTOMY  2017    [facility]    HX MALIGNANT SKIN LESION EXCISION      skin cancer from nose - BCCA per pt    HX ORTHOPAEDIC Left 2019    Ankle srugery     Social History:     Social History     Socioeconomic History    Marital status:      Spouse name: Not on file    Number of children: Not on file    Years of education: Not on file    Highest education level: Not on file   Tobacco Use    Smoking status: Never Smoker    Smokeless tobacco: Never Used   Substance and Sexual Activity    Alcohol use: Not Currently     Comment: maybe once a year - has a sip    Drug use: No    Sexual activity: Yes     Partners: Male       Recent use of: No recent use of aspirin (ASA), NSAIDS or steroids    Tetanus up to date: last tetanus booster within 10 years      Anesthesia Complications: None  History of abnormal bleeding : None  History of Blood Transfusions: no  Health Care Directive or Living Will: yes    REVIEW OF SYSTEMS:  A comprehensive review of systems was negative except for: Respiratory: positive for negative  Cardiovascular: positive for none  vision affected by ptosis. EXAM:   Visit Vitals  /84   Pulse 66   Temp 97.6 °F (36.4 °C) (Oral)   Resp 16   Ht 5' 1\" (1.549 m)   Wt 181 lb (82.1 kg)   SpO2 98%   BMI 34.20 kg/m²     General appearance - alert, well appearing, and in no distress  Eyes - pupils equal and reactive, extraocular eye movements intact  Ears - bilateral TM's and external ear canals normal  Nose - normal and patent, no erythema, discharge or polyps  Mouth - mucous membranes moist, pharynx normal without lesions  Neck - supple, no significant adenopathy  Lymphatics - no palpable lymphadenopathy, no hepatosplenomegaly  Chest - clear to auscultation, no wheezes, rales or rhonchi, symmetric air entry  Heart - normal rate, regular rhythm, normal S1, S2, no murmurs, rubs, clicks or gallops  Abdomen - soft, nontender, nondistended, no masses or organomegaly  Neurological - alert, oriented, normal speech, no focal findings or movement disorder noted  Musculoskeletal - no joint tenderness, deformity or swelling  Extremities - peripheral pulses normal, no pedal edema, no clubbing or cyanosis      DIAGNOSTICS:   1. EKG: EKG FINDINGS - normal EKG, normal sinus rhythm, unchanged from previous tracings    2. Labs:   Lab Results   Component Value Date/Time    WBC 8.1 06/10/2019 01:36 PM    HGB 12.4 06/10/2019 01:36 PM    Hemoglobin (POC) 11.8 11/20/2017 10:22 AM    HCT 37.3 06/10/2019 01:36 PM    PLATELET 388 22/23/3641 01:36 PM    MCV 85 06/10/2019 01:36 PM     Lab Results   Component Value Date/Time    Cholesterol, total 207 (H) 06/10/2019 01:36 PM    HDL Cholesterol 54 06/10/2019 01:36 PM    LDL, calculated 107 (H) 06/10/2019 01:36 PM    Triglyceride 231 (H) 06/10/2019 01:36 PM    CHOL/HDL Ratio 4.2 10/01/2010 08:08 AM     Lab Results   Component Value Date/Time    ALT (SGPT) 11 06/10/2019 01:36 PM    AST (SGOT) 15 06/10/2019 01:36 PM    Alk.  phosphatase 35 (L) 06/10/2019 01:36 PM    Bilirubin, total 0.3 06/10/2019 01:36 PM    Albumin 4.2 06/10/2019 01:36 PM    Protein, total 6.7 06/10/2019 01:36 PM    PLATELET 072 27/94/0682 01:36 PM     Lab Results   Component Value Date/Time    GFR est non-AA 68 06/10/2019 01:36 PM    GFR est AA 78 06/10/2019 01:36 PM    Creatinine 0.88 06/10/2019 01:36 PM    BUN 22 06/10/2019 01:36 PM    Sodium 143 06/10/2019 01:36 PM    Potassium 3.7 06/10/2019 01:36 PM    Chloride 101 06/10/2019 01:36 PM    CO2 26 06/10/2019 01:36 PM     Lab Results   Component Value Date/Time    TSH 1.170 06/10/2019 01:36 PM    T4, Free 1.86 (H) 06/10/2019 01:36 PM      Lab Results   Component Value Date/Time    Glucose 100 (H) 06/10/2019 01:36 PM    Glucose (POC) 117 (H) 11/20/2017 10:20 AM        IMPRESSION:   Diagnoses and all orders for this visit:    1. Ptosis due to aging -approved for surgery pending labs. -     AMB POC EKG ROUTINE W/ 12 LEADS, INTER & REP    2. Mixed hyperlipidemia -LDL goal of 100. Check labs to be sure that is met. Tolerating statins.  -     CBC WITH AUTOMATED DIFF  -     LIPID PANEL    3. Essential hypertension-pressure elevated initially today repeated was normal.  She will get a blood pressure cuff and check it at home and let me know readings.  -     METABOLIC PANEL, COMPREHENSIVE  -     AMB POC EKG ROUTINE W/ 12 LEADS, INTER & REP  -     [facility] BP FLOWSHEET    4. Acquired hypothyroidism -appears euthyroid. Check levels and adjust meds as needed. -     TSH AND FREE T4          Advised her to call back or return to office if symptoms worsen/change/persist.  Discussed expected course/resolution/complications of diagnosis in detail with patient. Medication risks/benefits/costs/interactions/alternatives discussed with patient. She was given an after visit summary which includes diagnoses, current medications, & vitals. She expressed understanding with the diagnosis and plan.      No contraindications to planned surgery    Ayo Mak MD 1/24/2020

## 2020-01-25 LAB
ALBUMIN SERPL-MCNC: 4.4 G/DL (ref 3.8–4.8)
ALBUMIN/GLOB SERPL: 1.8 {RATIO} (ref 1.2–2.2)
ALP SERPL-CCNC: 37 IU/L (ref 39–117)
ALT SERPL-CCNC: 17 IU/L (ref 0–32)
AST SERPL-CCNC: 21 IU/L (ref 0–40)
BASOPHILS # BLD AUTO: 0.1 X10E3/UL (ref 0–0.2)
BASOPHILS NFR BLD AUTO: 1 %
BILIRUB SERPL-MCNC: 0.3 MG/DL (ref 0–1.2)
BUN SERPL-MCNC: 21 MG/DL (ref 8–27)
BUN/CREAT SERPL: 25 (ref 12–28)
CALCIUM SERPL-MCNC: 9.6 MG/DL (ref 8.7–10.3)
CHLORIDE SERPL-SCNC: 98 MMOL/L (ref 96–106)
CHOLEST SERPL-MCNC: 225 MG/DL (ref 100–199)
CO2 SERPL-SCNC: 24 MMOL/L (ref 20–29)
CREAT SERPL-MCNC: 0.84 MG/DL (ref 0.57–1)
EOSINOPHIL # BLD AUTO: 0.2 X10E3/UL (ref 0–0.4)
EOSINOPHIL NFR BLD AUTO: 3 %
ERYTHROCYTE [DISTWIDTH] IN BLOOD BY AUTOMATED COUNT: 13.2 % (ref 11.7–15.4)
GLOBULIN SER CALC-MCNC: 2.5 G/DL (ref 1.5–4.5)
GLUCOSE SERPL-MCNC: 94 MG/DL (ref 65–99)
HCT VFR BLD AUTO: 37 % (ref 34–46.6)
HDLC SERPL-MCNC: 66 MG/DL
HGB BLD-MCNC: 12.2 G/DL (ref 11.1–15.9)
IMM GRANULOCYTES # BLD AUTO: 0 X10E3/UL (ref 0–0.1)
IMM GRANULOCYTES NFR BLD AUTO: 0 %
LDLC SERPL CALC-MCNC: 107 MG/DL (ref 0–99)
LYMPHOCYTES # BLD AUTO: 2.3 X10E3/UL (ref 0.7–3.1)
LYMPHOCYTES NFR BLD AUTO: 33 %
MCH RBC QN AUTO: 28.2 PG (ref 26.6–33)
MCHC RBC AUTO-ENTMCNC: 33 G/DL (ref 31.5–35.7)
MCV RBC AUTO: 86 FL (ref 79–97)
MONOCYTES # BLD AUTO: 0.5 X10E3/UL (ref 0.1–0.9)
MONOCYTES NFR BLD AUTO: 7 %
NEUTROPHILS # BLD AUTO: 4.1 X10E3/UL (ref 1.4–7)
NEUTROPHILS NFR BLD AUTO: 56 %
PLATELET # BLD AUTO: 318 X10E3/UL (ref 150–450)
POTASSIUM SERPL-SCNC: 4 MMOL/L (ref 3.5–5.2)
PROT SERPL-MCNC: 6.9 G/DL (ref 6–8.5)
RBC # BLD AUTO: 4.33 X10E6/UL (ref 3.77–5.28)
SODIUM SERPL-SCNC: 141 MMOL/L (ref 134–144)
T4 FREE SERPL-MCNC: 1.28 NG/DL (ref 0.82–1.77)
TRIGL SERPL-MCNC: 258 MG/DL (ref 0–149)
TSH SERPL DL<=0.005 MIU/L-ACNC: 4.28 UIU/ML (ref 0.45–4.5)
VLDLC SERPL CALC-MCNC: 52 MG/DL (ref 5–40)
WBC # BLD AUTO: 7.1 X10E3/UL (ref 3.4–10.8)

## 2020-01-27 DIAGNOSIS — I10 ESSENTIAL HYPERTENSION: Primary | ICD-10-CM

## 2020-02-09 RX ORDER — CLONIDINE HYDROCHLORIDE 0.1 MG/1
0.1 TABLET ORAL 2 TIMES DAILY
Qty: 180 TAB | Refills: 3 | Status: SHIPPED | OUTPATIENT
Start: 2020-02-09 | End: 2021-02-15

## 2020-02-14 RX ORDER — CHLORTHALIDONE 50 MG/1
TABLET ORAL
Qty: 90 TAB | Refills: 0 | Status: SHIPPED | OUTPATIENT
Start: 2020-02-14 | End: 2020-06-10

## 2020-02-14 RX ORDER — FENOFIBRATE 145 MG/1
TABLET, COATED ORAL
Qty: 90 TAB | Refills: 0 | Status: SHIPPED | OUTPATIENT
Start: 2020-02-14 | End: 2020-03-19

## 2020-02-14 RX ORDER — LEVOTHYROXINE SODIUM 112 UG/1
TABLET ORAL
Qty: 90 TAB | Refills: 0 | Status: SHIPPED | OUTPATIENT
Start: 2020-02-14 | End: 2020-06-10

## 2020-03-19 RX ORDER — METFORMIN HYDROCHLORIDE 500 MG/1
TABLET, EXTENDED RELEASE ORAL
Qty: 90 TAB | Refills: 0 | Status: SHIPPED | OUTPATIENT
Start: 2020-03-19 | End: 2020-06-17

## 2020-03-19 RX ORDER — ESTROGENS, CONJUGATED 0.62 MG/1
TABLET, FILM COATED ORAL
Qty: 90 TAB | Refills: 0 | Status: SHIPPED | OUTPATIENT
Start: 2020-03-19 | End: 2020-05-21 | Stop reason: SDUPTHER

## 2020-03-19 RX ORDER — FENOFIBRATE 145 MG/1
TABLET, COATED ORAL
Qty: 90 TAB | Refills: 0 | Status: SHIPPED | OUTPATIENT
Start: 2020-03-19 | End: 2020-08-25

## 2020-05-11 ENCOUNTER — HOSPITAL ENCOUNTER (OUTPATIENT)
Dept: MRI IMAGING | Age: 70
Discharge: HOME | End: 2020-05-11
Attending: ORTHOPAEDIC SURGERY
Payer: MEDICARE

## 2020-05-11 DIAGNOSIS — M75.41 IMPINGEMENT SYNDROME OF RIGHT SHOULDER: ICD-10-CM

## 2020-05-11 PROCEDURE — 73221 MRI JOINT UPR EXTREM W/O DYE: CPT

## 2020-05-21 ENCOUNTER — HOSPITAL ENCOUNTER (OUTPATIENT)
Dept: PREADMISSION TESTING | Age: 70
Discharge: HOME | End: 2020-05-21
Payer: MEDICARE

## 2020-05-21 VITALS
HEIGHT: 62 IN | HEART RATE: 67 BPM | WEIGHT: 172 LBS | BODY MASS INDEX: 31.65 KG/M2 | DIASTOLIC BLOOD PRESSURE: 83 MMHG | TEMPERATURE: 98.2 F | SYSTOLIC BLOOD PRESSURE: 167 MMHG

## 2020-05-21 LAB
CREAT SERPL-MCNC: 0.96 MG/DL (ref 0.55–1.02)
HGB BLD-MCNC: 12 G/DL (ref 11.5–16)
POTASSIUM SERPL-SCNC: 3.4 MMOL/L (ref 3.5–5.1)

## 2020-05-21 PROCEDURE — 85018 HEMOGLOBIN: CPT

## 2020-05-21 PROCEDURE — 82565 ASSAY OF CREATININE: CPT

## 2020-05-21 PROCEDURE — 36415 COLL VENOUS BLD VENIPUNCTURE: CPT

## 2020-05-21 PROCEDURE — 84132 ASSAY OF SERUM POTASSIUM: CPT

## 2020-05-21 RX ORDER — ROPINIROLE 1 MG/1
TABLET, FILM COATED ORAL
Qty: 60 TAB | Refills: 0 | Status: SHIPPED | OUTPATIENT
Start: 2020-05-21 | End: 2020-06-22

## 2020-05-21 NOTE — TELEPHONE ENCOUNTER
Requested Prescriptions     Pending Prescriptions Disp Refills    conjugated estrogens (Premarin) 0.625 mg tablet 90 Tab 0

## 2020-05-21 NOTE — PERIOP NOTES
Preoperative instructions reviewed with patient. Patient given two bottles of CHG soap. Instructions (reviewed) on use of CHG soap. Patient given SSI infection FAQS sheet. Patient was given the opportunity to ask questions on the information provided. PATIENT WAS PROVIDED WITH INSTRUCTIONS ON COVID 19 TESTING AND THAT IT NEEDS TO BE DONE PRIOR TO SURGERY, PATIENT VERBALIZED UNDERSTANDING. OPPORTUNITY FOR QUESTIONS WAS PROVIDED.

## 2020-05-31 ENCOUNTER — HOSPITAL ENCOUNTER (OUTPATIENT)
Dept: PREADMISSION TESTING | Age: 70
Discharge: HOME | End: 2020-05-31
Payer: MEDICARE

## 2020-05-31 DIAGNOSIS — Z20.822 ENCOUNTER FOR LABORATORY TESTING FOR COVID-19 VIRUS: ICD-10-CM

## 2020-05-31 PROCEDURE — 87635 SARS-COV-2 COVID-19 AMP PRB: CPT

## 2020-06-01 LAB — SARS-COV-2, COV2NT: NOT DETECTED

## 2020-06-03 ENCOUNTER — ANESTHESIA EVENT (OUTPATIENT)
Dept: MEDSURG UNIT | Age: 70
End: 2020-06-03
Payer: MEDICARE

## 2020-06-04 ENCOUNTER — HOSPITAL ENCOUNTER (OUTPATIENT)
Age: 70
Setting detail: OUTPATIENT SURGERY
Discharge: HOME | End: 2020-06-04
Attending: ORTHOPAEDIC SURGERY | Admitting: ORTHOPAEDIC SURGERY
Payer: MEDICARE

## 2020-06-04 ENCOUNTER — ANESTHESIA (OUTPATIENT)
Dept: MEDSURG UNIT | Age: 70
End: 2020-06-04
Payer: MEDICARE

## 2020-06-04 VITALS
BODY MASS INDEX: 32.47 KG/M2 | DIASTOLIC BLOOD PRESSURE: 75 MMHG | HEART RATE: 70 BPM | SYSTOLIC BLOOD PRESSURE: 142 MMHG | RESPIRATION RATE: 19 BRPM | WEIGHT: 172 LBS | OXYGEN SATURATION: 96 % | TEMPERATURE: 97.8 F | HEIGHT: 61 IN

## 2020-06-04 LAB
GLUCOSE BLD STRIP.AUTO-MCNC: 109 MG/DL (ref 65–100)
GLUCOSE BLD STRIP.AUTO-MCNC: 117 MG/DL (ref 65–100)
SERVICE CMNT-IMP: ABNORMAL
SERVICE CMNT-IMP: ABNORMAL

## 2020-06-04 PROCEDURE — 74011250636 HC RX REV CODE- 250/636: Performed by: ANESTHESIOLOGY

## 2020-06-04 PROCEDURE — 74011250637 HC RX REV CODE- 250/637: Performed by: ANESTHESIOLOGY

## 2020-06-04 PROCEDURE — 77030036560 HC SHLDR ARM PAD ABDUCTN S2SG -B: Performed by: ORTHOPAEDIC SURGERY

## 2020-06-04 PROCEDURE — 77030040361 HC SLV COMPR DVT MDII -B: Performed by: ORTHOPAEDIC SURGERY

## 2020-06-04 PROCEDURE — 77030008684 HC TU ET CUF COVD -B: Performed by: NURSE ANESTHETIST, CERTIFIED REGISTERED

## 2020-06-04 PROCEDURE — 77030026438 HC STYL ET INTUB CARD -A: Performed by: NURSE ANESTHETIST, CERTIFIED REGISTERED

## 2020-06-04 PROCEDURE — 77030006884 HC BLD SHV INCIS S&N -B: Performed by: ORTHOPAEDIC SURGERY

## 2020-06-04 PROCEDURE — 74011250636 HC RX REV CODE- 250/636: Performed by: NURSE ANESTHETIST, CERTIFIED REGISTERED

## 2020-06-04 PROCEDURE — 74011000250 HC RX REV CODE- 250: Performed by: NURSE ANESTHETIST, CERTIFIED REGISTERED

## 2020-06-04 PROCEDURE — 76210000035 HC AMBSU PH I REC 1 TO 1.5 HR: Performed by: ORTHOPAEDIC SURGERY

## 2020-06-04 PROCEDURE — 82962 GLUCOSE BLOOD TEST: CPT

## 2020-06-04 PROCEDURE — C1713 ANCHOR/SCREW BN/BN,TIS/BN: HCPCS | Performed by: ORTHOPAEDIC SURGERY

## 2020-06-04 PROCEDURE — 77030018835 HC SOL IRR LR ICUM -A: Performed by: ORTHOPAEDIC SURGERY

## 2020-06-04 PROCEDURE — 76060000063 HC AMB SURG ANES 1.5 TO 2 HR: Performed by: ORTHOPAEDIC SURGERY

## 2020-06-04 PROCEDURE — 77030002966 HC SUT PDS J&J -A: Performed by: ORTHOPAEDIC SURGERY

## 2020-06-04 PROCEDURE — 76030000003 HC AMB SURG OR TIME 1.5 TO 2: Performed by: ORTHOPAEDIC SURGERY

## 2020-06-04 PROCEDURE — 77030002916 HC SUT ETHLN J&J -A: Performed by: ORTHOPAEDIC SURGERY

## 2020-06-04 PROCEDURE — 77030016678 HC BUR SHV4 S&N -B: Performed by: ORTHOPAEDIC SURGERY

## 2020-06-04 PROCEDURE — 74011250636 HC RX REV CODE- 250/636: Performed by: PHYSICIAN ASSISTANT

## 2020-06-04 PROCEDURE — 77030010428: Performed by: ORTHOPAEDIC SURGERY

## 2020-06-04 PROCEDURE — 74011250636 HC RX REV CODE- 250/636: Performed by: ORTHOPAEDIC SURGERY

## 2020-06-04 PROCEDURE — 77030022922 HC BIT DRL SUT TAK KT -ARTH -C: Performed by: ORTHOPAEDIC SURGERY

## 2020-06-04 RX ORDER — SUCCINYLCHOLINE CHLORIDE 20 MG/ML
INJECTION INTRAMUSCULAR; INTRAVENOUS AS NEEDED
Status: DISCONTINUED | OUTPATIENT
Start: 2020-06-04 | End: 2020-06-04 | Stop reason: HOSPADM

## 2020-06-04 RX ORDER — SODIUM CHLORIDE 9 MG/ML
25 INJECTION, SOLUTION INTRAVENOUS CONTINUOUS
Status: DISCONTINUED | OUTPATIENT
Start: 2020-06-04 | End: 2020-06-04 | Stop reason: HOSPADM

## 2020-06-04 RX ORDER — ROPIVACAINE HYDROCHLORIDE 5 MG/ML
INJECTION, SOLUTION EPIDURAL; INFILTRATION; PERINEURAL
Status: COMPLETED | OUTPATIENT
Start: 2020-06-04 | End: 2020-06-04

## 2020-06-04 RX ORDER — PROPOFOL 10 MG/ML
INJECTION, EMULSION INTRAVENOUS AS NEEDED
Status: DISCONTINUED | OUTPATIENT
Start: 2020-06-04 | End: 2020-06-04 | Stop reason: HOSPADM

## 2020-06-04 RX ORDER — DEXAMETHASONE SODIUM PHOSPHATE 4 MG/ML
INJECTION, SOLUTION INTRA-ARTICULAR; INTRALESIONAL; INTRAMUSCULAR; INTRAVENOUS; SOFT TISSUE AS NEEDED
Status: DISCONTINUED | OUTPATIENT
Start: 2020-06-04 | End: 2020-06-04 | Stop reason: HOSPADM

## 2020-06-04 RX ORDER — CEFAZOLIN SODIUM/WATER 2 G/20 ML
2 SYRINGE (ML) INTRAVENOUS ONCE
Status: COMPLETED | OUTPATIENT
Start: 2020-06-04 | End: 2020-06-04

## 2020-06-04 RX ORDER — ROPIVACAINE HYDROCHLORIDE 5 MG/ML
30 INJECTION, SOLUTION EPIDURAL; INFILTRATION; PERINEURAL ONCE
Status: COMPLETED | OUTPATIENT
Start: 2020-06-04 | End: 2020-06-04

## 2020-06-04 RX ORDER — GLYCOPYRROLATE 0.2 MG/ML
INJECTION INTRAMUSCULAR; INTRAVENOUS AS NEEDED
Status: DISCONTINUED | OUTPATIENT
Start: 2020-06-04 | End: 2020-06-04 | Stop reason: HOSPADM

## 2020-06-04 RX ORDER — ROCURONIUM BROMIDE 10 MG/ML
INJECTION, SOLUTION INTRAVENOUS AS NEEDED
Status: DISCONTINUED | OUTPATIENT
Start: 2020-06-04 | End: 2020-06-04 | Stop reason: HOSPADM

## 2020-06-04 RX ORDER — MIDAZOLAM HYDROCHLORIDE 1 MG/ML
1 INJECTION, SOLUTION INTRAMUSCULAR; INTRAVENOUS AS NEEDED
Status: DISCONTINUED | OUTPATIENT
Start: 2020-06-04 | End: 2020-06-04 | Stop reason: HOSPADM

## 2020-06-04 RX ORDER — HYDROMORPHONE HYDROCHLORIDE 1 MG/ML
0.2 INJECTION, SOLUTION INTRAMUSCULAR; INTRAVENOUS; SUBCUTANEOUS
Status: DISCONTINUED | OUTPATIENT
Start: 2020-06-04 | End: 2020-06-04 | Stop reason: HOSPADM

## 2020-06-04 RX ORDER — NEOSTIGMINE METHYLSULFATE 1 MG/ML
INJECTION, SOLUTION INTRAVENOUS AS NEEDED
Status: DISCONTINUED | OUTPATIENT
Start: 2020-06-04 | End: 2020-06-04 | Stop reason: HOSPADM

## 2020-06-04 RX ORDER — SODIUM CHLORIDE 0.9 % (FLUSH) 0.9 %
5-40 SYRINGE (ML) INJECTION EVERY 8 HOURS
Status: DISCONTINUED | OUTPATIENT
Start: 2020-06-04 | End: 2020-06-04 | Stop reason: HOSPADM

## 2020-06-04 RX ORDER — FENTANYL CITRATE 50 UG/ML
50 INJECTION, SOLUTION INTRAMUSCULAR; INTRAVENOUS AS NEEDED
Status: DISCONTINUED | OUTPATIENT
Start: 2020-06-04 | End: 2020-06-04 | Stop reason: HOSPADM

## 2020-06-04 RX ORDER — EPHEDRINE SULFATE/0.9% NACL/PF 50 MG/5 ML
SYRINGE (ML) INTRAVENOUS AS NEEDED
Status: DISCONTINUED | OUTPATIENT
Start: 2020-06-04 | End: 2020-06-04 | Stop reason: HOSPADM

## 2020-06-04 RX ORDER — SODIUM CHLORIDE, SODIUM LACTATE, POTASSIUM CHLORIDE, CALCIUM CHLORIDE 600; 310; 30; 20 MG/100ML; MG/100ML; MG/100ML; MG/100ML
125 INJECTION, SOLUTION INTRAVENOUS CONTINUOUS
Status: DISCONTINUED | OUTPATIENT
Start: 2020-06-04 | End: 2020-06-04 | Stop reason: HOSPADM

## 2020-06-04 RX ORDER — LIDOCAINE HYDROCHLORIDE 10 MG/ML
0.1 INJECTION, SOLUTION EPIDURAL; INFILTRATION; INTRACAUDAL; PERINEURAL AS NEEDED
Status: DISCONTINUED | OUTPATIENT
Start: 2020-06-04 | End: 2020-06-04 | Stop reason: HOSPADM

## 2020-06-04 RX ORDER — SODIUM CHLORIDE 0.9 % (FLUSH) 0.9 %
5-40 SYRINGE (ML) INJECTION AS NEEDED
Status: DISCONTINUED | OUTPATIENT
Start: 2020-06-04 | End: 2020-06-04 | Stop reason: HOSPADM

## 2020-06-04 RX ORDER — ONDANSETRON 2 MG/ML
4 INJECTION INTRAMUSCULAR; INTRAVENOUS AS NEEDED
Status: DISCONTINUED | OUTPATIENT
Start: 2020-06-04 | End: 2020-06-04 | Stop reason: HOSPADM

## 2020-06-04 RX ORDER — ONDANSETRON 2 MG/ML
INJECTION INTRAMUSCULAR; INTRAVENOUS AS NEEDED
Status: DISCONTINUED | OUTPATIENT
Start: 2020-06-04 | End: 2020-06-04 | Stop reason: HOSPADM

## 2020-06-04 RX ORDER — MORPHINE SULFATE 10 MG/ML
2 INJECTION, SOLUTION INTRAMUSCULAR; INTRAVENOUS
Status: DISCONTINUED | OUTPATIENT
Start: 2020-06-04 | End: 2020-06-04 | Stop reason: HOSPADM

## 2020-06-04 RX ORDER — FENTANYL CITRATE 50 UG/ML
25 INJECTION, SOLUTION INTRAMUSCULAR; INTRAVENOUS
Status: DISCONTINUED | OUTPATIENT
Start: 2020-06-04 | End: 2020-06-04 | Stop reason: HOSPADM

## 2020-06-04 RX ORDER — ACETAMINOPHEN 325 MG/1
650 TABLET ORAL ONCE
Status: COMPLETED | OUTPATIENT
Start: 2020-06-04 | End: 2020-06-04

## 2020-06-04 RX ORDER — FENTANYL CITRATE 50 UG/ML
INJECTION, SOLUTION INTRAMUSCULAR; INTRAVENOUS AS NEEDED
Status: DISCONTINUED | OUTPATIENT
Start: 2020-06-04 | End: 2020-06-04 | Stop reason: HOSPADM

## 2020-06-04 RX ORDER — LIDOCAINE HYDROCHLORIDE 20 MG/ML
INJECTION, SOLUTION EPIDURAL; INFILTRATION; INTRACAUDAL; PERINEURAL AS NEEDED
Status: DISCONTINUED | OUTPATIENT
Start: 2020-06-04 | End: 2020-06-04 | Stop reason: HOSPADM

## 2020-06-04 RX ORDER — DIPHENHYDRAMINE HYDROCHLORIDE 50 MG/ML
12.5 INJECTION, SOLUTION INTRAMUSCULAR; INTRAVENOUS AS NEEDED
Status: DISCONTINUED | OUTPATIENT
Start: 2020-06-04 | End: 2020-06-04 | Stop reason: HOSPADM

## 2020-06-04 RX ORDER — MIDAZOLAM HYDROCHLORIDE 1 MG/ML
0.5 INJECTION, SOLUTION INTRAMUSCULAR; INTRAVENOUS
Status: DISCONTINUED | OUTPATIENT
Start: 2020-06-04 | End: 2020-06-04 | Stop reason: HOSPADM

## 2020-06-04 RX ORDER — SODIUM CHLORIDE, SODIUM LACTATE, POTASSIUM CHLORIDE, CALCIUM CHLORIDE 600; 310; 30; 20 MG/100ML; MG/100ML; MG/100ML; MG/100ML
75 INJECTION, SOLUTION INTRAVENOUS CONTINUOUS
Status: DISCONTINUED | OUTPATIENT
Start: 2020-06-04 | End: 2020-06-04 | Stop reason: HOSPADM

## 2020-06-04 RX ADMIN — Medication 2 G: at 09:54

## 2020-06-04 RX ADMIN — MIDAZOLAM 2 MG: 1 INJECTION INTRAMUSCULAR; INTRAVENOUS at 09:00

## 2020-06-04 RX ADMIN — ROPIVACAINE HYDROCHLORIDE 150 MG: 5 INJECTION, SOLUTION EPIDURAL; INFILTRATION; PERINEURAL at 09:02

## 2020-06-04 RX ADMIN — Medication 2 MG: at 11:13

## 2020-06-04 RX ADMIN — ONDANSETRON HYDROCHLORIDE 4 MG: 2 INJECTION, SOLUTION INTRAMUSCULAR; INTRAVENOUS at 10:43

## 2020-06-04 RX ADMIN — DEXAMETHASONE SODIUM PHOSPHATE 8 MG: 4 INJECTION, SOLUTION INTRAMUSCULAR; INTRAVENOUS at 09:55

## 2020-06-04 RX ADMIN — ACETAMINOPHEN 650 MG: 325 TABLET, FILM COATED ORAL at 08:40

## 2020-06-04 RX ADMIN — GLYCOPYRROLATE 0.4 MG: 0.2 INJECTION, SOLUTION INTRAMUSCULAR; INTRAVENOUS at 11:13

## 2020-06-04 RX ADMIN — LIDOCAINE HYDROCHLORIDE 80 MG: 20 INJECTION, SOLUTION EPIDURAL; INFILTRATION; INTRACAUDAL; PERINEURAL at 09:34

## 2020-06-04 RX ADMIN — FENTANYL CITRATE 50 MCG: 50 INJECTION, SOLUTION INTRAMUSCULAR; INTRAVENOUS at 09:34

## 2020-06-04 RX ADMIN — FENTANYL CITRATE 50 MCG: 50 INJECTION, SOLUTION INTRAMUSCULAR; INTRAVENOUS at 09:01

## 2020-06-04 RX ADMIN — PROPOFOL 150 MG: 10 INJECTION, EMULSION INTRAVENOUS at 09:34

## 2020-06-04 RX ADMIN — ROCURONIUM BROMIDE 10 MG: 10 SOLUTION INTRAVENOUS at 10:14

## 2020-06-04 RX ADMIN — ROCURONIUM BROMIDE 5 MG: 10 SOLUTION INTRAVENOUS at 09:34

## 2020-06-04 RX ADMIN — SODIUM CHLORIDE, SODIUM LACTATE, POTASSIUM CHLORIDE, AND CALCIUM CHLORIDE 125 ML/HR: 600; 310; 30; 20 INJECTION, SOLUTION INTRAVENOUS at 08:45

## 2020-06-04 RX ADMIN — SUCCINYLCHOLINE CHLORIDE 160 MG: 20 INJECTION, SOLUTION INTRAMUSCULAR; INTRAVENOUS at 09:35

## 2020-06-04 RX ADMIN — ROCURONIUM BROMIDE 25 MG: 10 SOLUTION INTRAVENOUS at 09:46

## 2020-06-04 RX ADMIN — ROPIVACAINE HYDROCHLORIDE 30 ML: 5 INJECTION, SOLUTION EPIDURAL; INFILTRATION; PERINEURAL at 09:09

## 2020-06-04 RX ADMIN — Medication 10 MG: at 10:32

## 2020-06-04 NOTE — DISCHARGE INSTRUCTIONS
[facility]    POST OPERATIVE INSTRUCTIONS  Rotator Cuff Repair - Shoulder  MD Fadumo Obando PA-C           1. First meal at home should be clear liquids. Progress to regular diet as tolerated. 2. Apply an ice bag or use cold therapy device for 20-30 minutes 8 times  a day for the first 10 days to reduce swelling and pain and then use as desired. 3. You may remove the bulky dressing 48 hours after surgery and at that time it is ok to shower. Dry incisions well after showering and cover with Bandaids. 4. You will use a sling until your first post-op visit. You may remove the sling to shower- keeping your arm down by your side. Please do not actively lift your arm away from your side. 5. A recliner position is often more comfortable for sleeping the first several days/ weeks after surgery but you may sleep however you are most comfortable with the sling on. 6. Medication :  oxycodone: All narcotics can cause constipation, so please be aware and drink plenty of  water and take over the counter stool softner or Miralax. Be sure to start taking the pain medication before the nerve block wears off.        7. A post operative appointment has been scheduled for you. __6/15 at 2:30pm with Jonel Mcconnell PA-C   Please call the office if you need to change the date or time of your appointment. 8. If you develop a fever greater than 101.5 unexpected redness, or swelling in your arm please call the office . Some bleeding or drainage should be expected the first few days after surgery. 9. Thank you for following the above instructions . Please call the office if you have questions and the  will put you in touch with one of our team members. [phone]           Post op Exercises:    Start this exercise on Saturday. Pendulum swing      1. Hold on to a table or the back of a chair with your good arm.  Then bend forward a little and let your sore arm hang straight down. This exercise does not use the arm muscles. Rather, use your legs and your hips to create movement that makes your arm swing freely. 2. Use the movement from your hips and legs to guide the slightly swinging arm back and forth like a pendulum (or elephant trunk). Then guide it in circles that start small (about the size of a dinner plate). Make the circles a bit larger each day, as your pain allows. 3. Do this exercise for 5 minutes, 5 times each day. 4. As you have less pain, try bending over a little farther to do this exercise. This will increase the amount of movement at your shoulder. DISCHARGE SUMMARY from Nurse    PATIENT INSTRUCTIONS:    After general anesthesia or intravenous sedation, for 24 hours or while taking prescription Narcotics:  · Limit your activities  · Do not drive and operate hazardous machinery  · Do not make important personal or business decisions  · Do  not drink alcoholic beverages  · If you have not urinated within 8 hours after discharge, please contact your surgeon on call. Report the following to your surgeon:  · Excessive pain, swelling, redness or odor of or around the surgical area  · Temperature over 100.5  · Nausea and vomiting lasting longer than 4 hours or if unable to take medications  · Any signs of decreased circulation or nerve impairment to extremity: change in color, persistent  numbness, tingling, coldness or increase pain  · Any questions    What to do at Home:  Recommended activity: See surgical instructions,     If you experience any of the following symptoms as instructed, please follow up with Dr Conchis Castillo. *  Please give a list of your current medications to your Primary Care Provider. *  Please update this list whenever your medications are discontinued, doses are      changed, or new medications (including over-the-counter products) are added.     *  Please carry medication information at all times in case of emergency situations. These are general instructions for a healthy lifestyle:    No smoking/ No tobacco products/ Avoid exposure to second hand smoke  Surgeon General's Warning:  Quitting smoking now greatly reduces serious risk to your health. Obesity, smoking, and sedentary lifestyle greatly increases your risk for illness    A healthy diet, regular physical exercise & weight monitoring are important for maintaining a healthy lifestyle    You may be retaining fluid if you have a history of heart failure or if you experience any of the following symptoms:  Weight gain of 3 pounds or more overnight or 5 pounds in a week, increased swelling in our hands or feet or shortness of breath while lying flat in bed. Please call your doctor as soon as you notice any of these symptoms; do not wait until your next office visit. The discharge information has been reviewed with the patient and spouse. The patient and spouse verbalized understanding. Discharge medications reviewed with the patient and spouse and appropriate educational materials and side effects teaching were provided.   ___________________________________________________________________________________________________________________________________

## 2020-06-04 NOTE — ANESTHESIA PREPROCEDURE EVALUATION
Relevant Problems   No relevant active problems       Anesthetic History   No history of anesthetic complications            Review of Systems / Medical History  Patient summary reviewed, nursing notes reviewed and pertinent labs reviewed    Pulmonary  Within defined limits                 Neuro/Psych   Within defined limits           Cardiovascular    Hypertension: well controlled                   GI/Hepatic/Renal  Within defined limits              Endo/Other    Diabetes: well controlled, type 2  Hypothyroidism  Obesity and arthritis  Pertinent negatives: No morbid obesity   Other Findings              Physical Exam    Airway  Mallampati: II  TM Distance: > 6 cm  Neck ROM: normal range of motion   Mouth opening: Normal     Cardiovascular  Regular rate and rhythm,  S1 and S2 normal,  no murmur, click, rub, or gallop             Dental  No notable dental hx       Pulmonary  Breath sounds clear to auscultation               Abdominal  GI exam deferred       Other Findings            Anesthetic Plan    ASA: 2  Anesthesia type: general      Post-op pain plan if not by surgeon: peripheral nerve block single    Induction: Intravenous  Anesthetic plan and risks discussed with: Patient

## 2020-06-04 NOTE — H&P
Reason for Visit    Reason Comments   Pain     Encounter Details    Date Type Department Care Team Description   05/13/2020 Office Visit OV [address] [facility]    [address] [facility], [address], [address]   [phone]   Vineet Valera MD    [address]    [place], [address]   [phone] (Fax)   Tear of right glenoid labrum, initial encounter (Primary Dx); Impingement syndrome of right shoulder; Arthritis of glenohumeral joint   Social History  - documented as of this encounter  Tobacco Use Types Packs/Day Years Used Date   Never Smoker           Smokeless Tobacco: Never Used           Alcohol Use Drinks/Week oz/Week Comments   No           Sex Assigned at Birth Date Recorded   Not on file     Last Filed Vital Signs  - documented in this encounter  Vital Sign Reading Time Taken Comments   Blood Pressure - -     Pulse - -     Temperature - -     Respiratory Rate - -     Oxygen Saturation - -     Inhaled Oxygen Concentration - -     Weight 79.8 kg (176 lb) 05/13/2020 1:36 PM EDT     Height 154.9 cm (5' 1\") 05/13/2020 1:36 PM EDT     Body Mass Index 33.25 05/13/2020 1:36 PM EDT     Progress Notes  - documented in this encounter  Kory Bocanegrat - 05/13/2020 1:40 PM EDT  Formatting of this note might be different from the original.  PCP: Jonnathan Saleem MD  Problem List   None     Subjective: There is no problem list on file for this patient. Chief Complaint: Pain of the Right Shoulder    HPI: Maria Esther Toussaint is a 72 y.o. female who returns for follow-up of her right shoulder pain. We previously ordered an MRI of her right shoulder 5/6/20 to evaluate for a full-thickness rotator cuff tear. She returns today to discuss the results. She reports continued right shoulder pain that has not improved since her last visit with our office. She points to her posterior lateral right shoulder when describing her pain.  She reports her pain first began after she fell over a [place] on [address] and caught herself with her right arm. She says this injury occurred early , however, her right shoulder was not painful until she lifted a milk jug in early March with her right arm. Objective: There were no vitals filed for this visit. Height: 5' 1\"   Wt Readings from Last 3 Encounters:   20 176 lb   20 176 lb   20 176 lb 3.2 oz     Body mass index is 33.25 kg/m². Musculoskeletal : Right Shoulder: She has good range of motion of the neck. She has good passive ROM with significant pain on motion particularly past 90 degrees with forward flexion and abduction. She has good active ROM with significant pain on motion particularly past 90 degrees with forward flexion and abduction. She has no weakness with rotator cuff strength testing. She has good internal and external rotation. She has a positive impingement sign. She has a negative Spurling sign. Skin healthy and in tact. Neurovascularly in tact. Radiographs:         Mri Shoulder Right Without Contrast ([id number])    Result Date: 2020  [facility] - MRI SHOULDER RT WO CONT Patient: Jessica Cavanaugh : 1950 Date of Service: 2020 1:45:25 PM Reason For Exam: Ordering Provider: Tomeka Valdivia Physician: Guera Carrero Signing Date: 2020 1:59:00 PM EXAM: MRI SHOULDER RT WO CONT INDICATION: Impingement syndrome right shoulder M 75.41. Symptoms over last 7 weeks. Continued pain despite physical therapy and injections. Ean Snyder 2019. COMPARISON: None TECHNIQUE: Axial proton density fat-saturated; oblique coronal T1, T2 fat-saturated, and proton density fat-saturated; and oblique sagittal T2 fat-saturated MRI of the right shoulder . CONTRAST: None. FINDINGS: A.C. joint: Moderate osteoarthrosis. Anterior acromion process type: 2-3. Bone marrow: Within normal limits. No acute fracture, dislocation, or marrow replacing process.  Joint fluid: Small amounts of fluid signal in glenohumeral joint and subacromial subdeltoid bursa. Rotator cuff tendons: Diffuse tendinopathy. Mild articular sided partial thickness tearing of anterior supraspinatus tendon. No tendon retraction or full-thickness tendon tear demonstrated. Biceps tendon: Intact origin. Mild-moderate intracapsular tendinopathy. Anatomic extracapsular location. Muscles: No edema or atrophy. Glenoid labrum: Large para labral cyst appearing to arise at 10:00-11:00 with extension to the suprascapular notch and spinoglenoid recess. Cyst measures 3 cm transverse, 2.5 cm AP and 2.1 cm craniocaudal. There is likely tearing and the labrum at 10:00-11:00. Glenohumeral joint capsule: within normal limits. Glenohumeral articular cartilage: Mild osteoarthrosis. Soft tissue mass: Other than above, none. IMPRESSION: 1. Large para labral cyst appearing to arise from a labral tear at 10:00-11:00. Cyst extends to spinoglenoid recess and suprascapular notch. 2. Rotator cuff tendinopathy with mild partial thickness tearing of anterior supraspinatus tendon. 3. Long head biceps tendinopathy. 4. Mild glenohumeral osteoarthrosis. Signing date/time: 5/11/2020 1:59 PM Signed by: SUN Sood     Assessment:     1. Tear of right glenoid labrum, initial encounter   2. Impingement syndrome of right shoulder   3. Arthritis of glenohumeral joint     Plan:     I reviewed the MRI ordered of the right shoulder 5/7/20 with the patient. She has mild glenohumeral joint osteoarthritis, rotator cuff tendinopathy, and no full-thickness rotator cuff tear. There is also a large labral cyst from 10:00-11:00 with a labral tear. I discussed these diagnoses with the patient as well as treatment options. We discussed labral repair surgery vs continued conservative management as well as the pros and cons of each. I brought in my partner, Dr. Reyes Quan, to discuss labral repair surgery. They discussed labral repair surgery at length including post-op recovery and expected outcomes.  He explained that she will be placed in a shoulder immobilizer brace with pillow for the first 6 weeks post-op and that they will restrict use of her shoulder significantly for the first 6 weeks post-op. They will likely refer her to PT after 6 weeks. She should expect to return to most activities with her shoulder at 3 months post-op. They also discussed risks and complications of the procedure including failure of the repair, infection, DVT, and PE. After a lengthy discussion, the patient wishes to proceed with surgery with Dr. Saim Lai. We will schedule surgery at her convenience. Follow-up for scheduled surgery with Dr. Sami Lai. She will follow-up with my office on a PRN basis. Orders Placed This Encounter    BMI >=25 PATIENT INSTRUCTIONS & EDUCATION     Return for Scheduled surgery with Dr. Sami Lai. Medical documentation was entered by me, Paige Allison Medical Scribe for Dr. Rhina Quesada.    5/13/2020    I, Piedad Mai MD, personally, performed the services described in this documentation, as scribed in my presence, and it is both accurate and complete.    Electronically signed by Piedad Mai MD at 05/13/2020 5:13 PM EDT    Plan of Treatment  - documented as of this encounter  Upcoming Encounters  Upcoming Encounters   Date Type Specialty Care Team Description   06/04/2020 Surgical Encounter Orthopaedic Surgery Abel Toth MD    [facility]   [address]    [facility], [address]   [phone] ([place]      Fernando Pope PA-C    [facility] [address]    [place], [address]   [phone] (Fax)       06/15/2020 Office Visit Orthopaedic Surgery Fernando Pope PA-C    [facility] [address]    [place], [address]   [phone] ([address]      Visit Diagnoses  - documented in this encounter  Diagnosis   Tear of right glenoid labrum, initial encounter - Primary     Impingement syndrome of right shoulder     Arthritis of glenohumeral joint     Orders  - documented in this encounter  Health Maintenance Count Last Ordered Date First Ordered Date   BMI >=25 PATIENT COUNSELING 1 05/13/2020     Images  Patient Demographics    Patient Address Communication Language Race / Ethnicity Marital Status   [facility], [address] [facility] [address] [facility] [phone] [facility])  [phone] [facility]  [email or web address] [place] - Written (Preferred) White / Not  or     Patient Contacts    Contact Name Contact Address Communication Relationship to Patient   Yelena Ledbetter Unknown [phone] [facility]) Spouse, Emergency Contact   Document Information    Primary Care Provider Other Service Providers Document Coverage Dates   Ericka Vogel MD (Jun. 30, 2017June 30, 2017 - Present)  [phone] (Work)  [phone] (Fax)  [address] [facility], [address] [place] [address]  Internal Medicine  [address]  May 13, 2020May 13 [phone]     Lujean Ganser   [place]  [phone] (Work)  [address] [facility] [address] [facility] [address]  [address]     Encounter Providers Encounter Date   Merle Romero MD (Attending)  [phone] (Work)  [phone] (Fax)  [address]  [address]  Orthopedic Surgery May 13, 2020May 13, 2020      Show All Sections

## 2020-06-04 NOTE — ANESTHESIA PROCEDURE NOTES
Peripheral Block    Start time: 6/4/2020 9:02 AM  End time: 6/4/2020 9:09 AM  Performed by: Robin Beyer MD  Authorized by: Robin Beyer MD       Pre-procedure: Indications: at surgeon's request and post-op pain management    Preanesthetic Checklist: patient identified, risks and benefits discussed, site marked, timeout performed, anesthesia consent given and patient being monitored    Timeout Time: 09:02          Block Type:   Block Type:   Interscalene  Laterality:  Right  Monitoring:  Standard ASA monitoring, continuous pulse ox, frequent vital sign checks, heart rate, responsive to questions and oxygen  Injection Technique:  Single shot  Procedures: ultrasound guided and nerve stimulator    Patient Position: supine  Prep: betadine and povidone-iodine 7.5% surgical scrub    Location:  Interscalene  Needle Type:  Stimuplex  Needle Gauge:  22 G  Needle Localization:  Nerve stimulator and ultrasound guidance  Motor Response: minimal motor response >0.4 mA      Assessment:  Number of attempts:  1  Injection Assessment:  Incremental injection every 5 mL, local visualized surrounding nerve on ultrasound, negative aspiration for blood, no paresthesia, negative aspiration for CSF, ultrasound image on chart and no intravascular symptoms  Patient tolerance:  Patient tolerated the procedure well with no immediate complications

## 2020-06-04 NOTE — ANESTHESIA POSTPROCEDURE EVALUATION
Post-Anesthesia Evaluation and Assessment    Patient: Magnus Hinojosa MRN: [id number]  SSN: [id number]    YOB: 1950  Age: 72 y.o. Sex: female      I have evaluated the patient and they are stable and ready for discharge from the PACU. Cardiovascular Function/Vital Signs  Visit Vitals  /60   Pulse (!) 59   Temp 36.6 °C (97.8 °F)   Resp 16   Ht 5' 1\" (1.549 m)   Wt 78 kg (172 lb)   SpO2 100%   BMI 32.50 kg/m²       Patient is status post General anesthesia for Procedure(s):  RIGHT SHOULDER ARTHROSCOPIC  Gleno-Humeral debriedment, POSTERIOR LABRAL REPAIR, subacromial decompression, distal clavical resection  (GEN WITH BLOCK). Nausea/Vomiting: None    Postoperative hydration reviewed and adequate. Pain:  Pain Scale 1: Numeric (0 - 10) (06/04/20 1126)  Pain Intensity 1: 0 (06/04/20 1126)   Managed    Neurological Status:   Neuro (WDL): Within Defined Limits (06/04/20 1126)   At baseline    Mental Status, Level of Consciousness: Alert and  oriented to person, place, and time    Pulmonary Status:   O2 Device: CO2 nasal cannula (06/04/20 1127)   Adequate oxygenation and airway patent    Complications related to anesthesia: None    Post-anesthesia assessment completed. No concerns    Signed By: Eduar Baron MD     June 4, 2020              Procedure(s):  RIGHT SHOULDER ARTHROSCOPIC  Gleno-Humeral debriedment, POSTERIOR LABRAL REPAIR, subacromial decompression, distal clavical resection  (GEN WITH BLOCK). general    <BSHSIANPOST>    INITIAL Post-op Vital signs:   Vitals Value Taken Time   /66 6/4/2020 11:45 AM   Temp 36.6 °C (97.8 °F) 6/4/2020 11:27 AM   Pulse 66 6/4/2020 11:55 AM   Resp 19 6/4/2020 11:55 AM   SpO2 100 % 6/4/2020 11:55 AM   Vitals shown include unvalidated device data.

## 2020-06-10 RX ORDER — LEVOTHYROXINE SODIUM 112 UG/1
TABLET ORAL
Qty: 90 TAB | Refills: 0 | Status: SHIPPED | OUTPATIENT
Start: 2020-06-10 | End: 2020-09-09

## 2020-06-10 RX ORDER — CHLORTHALIDONE 50 MG/1
TABLET ORAL
Qty: 90 TAB | Refills: 0 | Status: SHIPPED | OUTPATIENT
Start: 2020-06-10 | End: 2020-09-09

## 2020-06-10 NOTE — OP NOTES
Shoulder Arthroscopy Operative Note        Patient: Mayra Dove MRN: [id number]  SSN: [id number]    YOB: 1950  Age: 72 y.o. Sex: female        Date of Surgery: 6/4/2020    Preoperative Diagnosis:      POSTERIOR LABRAL TEAR RIGHT SHOULDER WITH PARALABRAL CYST    Postoperative Diagnosis:    1, RIGHT SHOULDER POSTERIOR LABRAL TEAR WITH PARALABRAL CYST        2. SUBACROMIAL IMPINGEMENT AND AC ARTHRITIS    Procedures:   1. RIGHT SHOULDER ARTHROSCOPY WITH POSTERIOR LABRAL REPAIR  AND EVACUATION AND DRAINAGE OF PARALABRAL CYST     2. SUBACROMIAL DECOMPRESSION AND DEBRIDEMENT     3. DISTAL CLAVICLE EXCISION    Surgeon(s) and Role:     Wyona Hodgkins, Julious(Jody) P, MD - Primary       First Assistant: Vernette Carrel, PA-C    2ND ASSISTANT: No Isaac DO - Fellow     Anesthesia: General    Pathology:  1. Right Posterior labral tear with large paralabral cyst     2. Subacromial impingement and [facility] arthrosis    Hospital Problems  Date Reviewed: 6/4/2020    None          Indications: The patient is a 72 y.o. female who has right shoulder  Posterior labral tear and significant paralabral cyst. The patient has exhausted nonoperative modalities and is electively admitted for outpatient right shoulder arthroscopy. PROCEDURE IN DETAIL: After the procedure was explained to the patient, including the risks, benefits and possible complications, the patient signed the informed consent. The patient was then taken to the operating suite. Following administration of general anesthesia and interscalene block for postoperative pain control and infusion of intravenous antibiotic, the patient was positioned on the operating table in the supine fashion. The  right  shoulder was then examined under anesthesia and noted to be  Slightly unstablewith   posterior stress testing. At this point, the patient was then carefully positioned in the lateral decubitus position, left side down. The axillary roll was placed.  Care was taken to pad both the upper and lower extremities. The right shoulder was then prepped and draped in the sterile fashion. The right arm was then placed in the Pet Ready traction device in 45 degrees abduction and 30 degrees forward flexion with 10 pounds of traction. The scope was introduced into the shoulder through a posterior portal and the shoulder was distended and fully inspected and diagnostic arthroscopy commenced. Articular surfaces of the humeral head and glenoid were visualized and noted to be  intact. The anterior, posterior, superior and inferior labrum were visualized. There was  The obvious tear the very posterior superior aspect of the labrum at approximately the 11 o'clock position on the glenoid. The biceps anchor and the biceps itself was  intact. The undersurface of the rotator cuff was then visualized. There was  Not torn but had some undersurface fraying which was carefully debrided with sucker shaver. .  An anterior portal was then created from outside in using a spinal needle for localization. The   Posterior superior labrum was carefully mobilized from the glenoid using an elevator and a shaver and the glenoid wall was abraded with a shaver to provide a healing bed. An initial anchor was placed in the glenoid at the  11 o'clock position. Using the spectrum tissue repair device a 0-PDS suture was placed through the labrum and used to shuttle one limb of the suture from the anchor through the labrum. This suture was used to advance the labral tissue superiorly to re-approximate the labrum to its normal position on the glenoid rim and tighten the laxity created from its detachment. The labrum was then probed and found to be well attached to the glenoid and the laxity was confirmed to be corrected. Attention then turned to the subacromial space. Anterior inflow was created and careful evaluation was done.   Significant evidence of bursa inflammation was seen this was carefully removed with a sucker shaver to expose a very prominent subacromial prominence. The subacromial decompression was then carefully performed using a familia from a posterior cutting block technique. The distal clavicle was extend encountered and seen to have a large inferior spurs and be completely denuded avulsed cartilaginous covering. A  Distal clavicle excision was then performed removing approximately 1 cm of distal clavicle taking careful care to keep both the superior and posterior capsules intact. The rotator cuff was carefully visualized and seen to be normal.At this point, with the procedure complete, all arthroscopic equipment was removed from the shoulder. The portals were reapproximated using 2-0 nylon sutures. A sterile dressing was applied. The Cryo/Cuff and swathe were applied. The patient was then transferred to the Recovery Room in stable condition. Signed: Elizabeth Houston MD (6/4/2020 at 11:29 AM)

## 2020-06-17 RX ORDER — METFORMIN HYDROCHLORIDE 500 MG/1
TABLET, EXTENDED RELEASE ORAL
Qty: 90 TAB | Refills: 0 | Status: SHIPPED | OUTPATIENT
Start: 2020-06-17 | End: 2020-09-08

## 2020-06-22 RX ORDER — ROPINIROLE 1 MG/1
TABLET, FILM COATED ORAL
Qty: 60 TAB | Refills: 0 | Status: SHIPPED | OUTPATIENT
Start: 2020-06-22 | End: 2020-07-23

## 2020-07-23 ENCOUNTER — OFFICE VISIT (OUTPATIENT)
Dept: INTERNAL MEDICINE CLINIC | Age: 70
End: 2020-07-23

## 2020-07-23 VITALS
HEIGHT: 61 IN | BODY MASS INDEX: 32.28 KG/M2 | SYSTOLIC BLOOD PRESSURE: 139 MMHG | WEIGHT: 171 LBS | RESPIRATION RATE: 14 BRPM | DIASTOLIC BLOOD PRESSURE: 80 MMHG | OXYGEN SATURATION: 100 % | HEART RATE: 63 BPM

## 2020-07-23 DIAGNOSIS — E66.01 SEVERE OBESITY (HCC): ICD-10-CM

## 2020-07-23 DIAGNOSIS — L30.9 HAND ECZEMA: ICD-10-CM

## 2020-07-23 DIAGNOSIS — I10 ESSENTIAL HYPERTENSION: ICD-10-CM

## 2020-07-23 DIAGNOSIS — Z12.11 COLON CANCER SCREENING: ICD-10-CM

## 2020-07-23 DIAGNOSIS — E03.9 ACQUIRED HYPOTHYROIDISM: ICD-10-CM

## 2020-07-23 DIAGNOSIS — E78.2 MIXED HYPERLIPIDEMIA: ICD-10-CM

## 2020-07-23 DIAGNOSIS — Z00.00 MEDICARE ANNUAL WELLNESS VISIT, SUBSEQUENT: Primary | ICD-10-CM

## 2020-07-23 RX ORDER — ROPINIROLE 1 MG/1
TABLET, FILM COATED ORAL
Qty: 60 TAB | Refills: 0 | Status: SHIPPED | OUTPATIENT
Start: 2020-07-23 | End: 2020-08-25

## 2020-07-23 RX ORDER — TRIAMCINOLONE ACETONIDE 1 MG/G
CREAM TOPICAL 2 TIMES DAILY
Qty: 45 G | Refills: 1 | Status: SHIPPED | OUTPATIENT
Start: 2020-07-23 | End: 2020-09-15 | Stop reason: SDUPTHER

## 2020-07-23 NOTE — PROGRESS NOTES
This is the Subsequent Medicare Annual Wellness Exam, performed 12 months or more after the Initial AWV or the last Subsequent AWV    I have reviewed the patient's medical history in detail and updated the computerized patient record. As well as a follow-up of her health issues. She recently right rotator cuff surgery and is recovering well from that. Blood pressures been under good control. She is tolerating medications for her cholesterol well. She denies headaches. No dizziness. No nosebleeds. No chest pains or shortness of breath. No change in bowel or bladder habits. Her weight is slightly decreased.     History     Patient Active Problem List   Diagnosis Code    Back pain M54.9    Essential hypertension I10    Acquired hypothyroidism E03.9    Advance directive discussed with patient Z70.80    Mixed hyperlipidemia E78.2    Neuropathic pain of both feet G57.93    Severe obesity ([address] E66.01     Past Medical History:   Diagnosis Date    Arthritis     Cancer ([address]     skin    Chronic pain     back - PT    Diabetes ([address]     PRE DIABETIC    Hypercholesterolemia     Hypertension     Ill-defined condition     N/V    Ill-defined condition     right eye scheduled for cataract removed 2017    Thyroid disease       Past Surgical History:   Procedure Laterality Date    BREAST SURGERY PROCEDURE UNLISTED  ,3/2007,     breast biopsy - all benign    HX CATARACT REMOVAL Bilateral 2018    HX  SECTION      HX COLONOSCOPY  16    Dr. Maria Esther Allison HX HEENT Bilateral 2020    EYE LIFT    HX HEENT  2020    eyelid surgery     HX ENT      oral sx     HX HYSTERECTOMY      HX LAP CHOLECYSTECTOMY  2017    Momin    HX MALIGNANT SKIN LESION EXCISION      skin cancer from nose - BCCA per pt    HX ORTHOPAEDIC Left 2019    Ankle srugery    HX ORTHOPAEDIC Right 2020    Dr. Dusty Escobar    ORAL SURGERY     Current Outpatient Medications   Medication Sig Dispense Refill    rOPINIRole (REQUIP) 1 mg tablet Take 1 tablet by mouth twice daily 60 Tab 0    metFORMIN ER (GLUCOPHAGE XR) 500 mg tablet TAKE 1 TABLET BY MOUTH ONCE DAILY WITH SUPPER 90 Tab 0    Euthyrox 112 mcg tablet TAKE 1 TABLET BY MOUTH BEFORE BREAKFAST . APPOINTMENT REQUIRED FOR FUTURE REFILLS 90 Tab 0    chlorthalidone (HYGROTEN) 50 mg tablet Take 1 tablet by mouth once daily 90 Tab 0    conjugated estrogens (Premarin) 0.625 mg tablet Take 1 tablet by mouth once daily 90 Tab 0    fenofibrate nanocrystallized (TRICOR) 145 mg tablet Take 1 tablet by mouth once daily (Patient taking differently: Take 145 mg by mouth nightly.) 90 Tab 0    cloNIDine HCL (CATAPRES) 0.1 mg tablet Take 1 Tab by mouth two (2) times a day. 180 Tab 3    ketoconazole (NIZORAL) 2 % topical cream       Cholecalciferol, Vitamin D3, (VITAMIN D3) 2,000 unit cap capsule Take 2,000 Units by mouth daily. 90 Cap 0    vitamin E (AQUA GEMS) 400 unit capsule Take 400 Units by mouth daily.  GLUCOSAMINE/CHONDROITIN SULF A (GLUCOSAMINE-CHONDROITIN PO) Take  by mouth. 1500mg/1200mg per 2 pills. Takes one pill once daily.  loratadine (ALLERCLEAR) 10 mg tablet Take 10 mg by mouth daily as needed for Allergies.        Allergies   Allergen Reactions    Codeine Other (comments)     Hallucination/room spinning    Sudafed [Pseudoephedrine Hcl] Palpitations    Darvocet A500 [Propoxyphene N-Acetaminophen] Nausea and Vomiting       Family History   Problem Relation Age of Onset    Heart Disease Father         CAD, CABG    Diabetes Father         insulin    Hypertension Mother     Cancer Mother         nose - skin - BCCA    Hypertension Brother     Other Brother         HEART MURMUR    Hypertension Brother     Other Brother         PVD    Kidney Disease Brother         dialysis    Diabetes Brother         insulin    Anesth Problems Neg Hx      Social History     Tobacco Use    Smoking status: Never Smoker    Smokeless tobacco: Never Used   Substance Use Topics    Alcohol use: Not Currently     Comment: maybe once a year - has a sip   ROS - Per HPI  Physical Examination: General appearance - alert, well appearing, and in no distress  Ears - bilateral TM's and external ear canals normal  Nose - normal and patent, no erythema, discharge or polyps  Mouth - mucous membranes moist, pharynx normal without lesions  Neck - supple, no significant adenopathy  Lymphatics - no palpable lymphadenopathy, no hepatosplenomegaly  Chest - clear to auscultation, no wheezes, rales or rhonchi, symmetric air entry  Heart - normal rate and regular rhythm  Abdomen - soft, nontender, nondistended, no masses or organomegaly  Neurological - alert, oriented, normal speech, no focal findings or movement disorder noted  Musculoskeletal - no joint tenderness, deformity or swelling  Extremities - peripheral pulses normal, no pedal edema, no clubbing or cyanosis  Skinhands with dry scaly skin along the edges of most digits. Depression Risk Factor Screening:     3 most recent PHQ Screens 7/23/2020   Little interest or pleasure in doing things Not at all   Feeling down, depressed, irritable, or hopeless Not at all   Total Score PHQ 2 0       Alcohol Risk Factor Screening:   Do you average 1 drink per night or more than 7 drinks a week:  No    On any one occasion in the past three months have you have had more than 3 drinks containing alcohol:  No      Functional Ability and Level of Safety:   Hearing: Hearing is good. Activities of Daily Living: The home contains: no safety equipment. Patient does total self care     Ambulation: with no difficulty     Fall Risk:  Fall Risk Assessment, last 12 mths 7/23/2020   Able to walk? Yes   Fall in past 12 months?  No   Fall with injury? -   Number of falls in past 12 months -   Fall Risk Score -     Abuse Screen:  Patient is not abused       Cognitive Screening   Has your family/caregiver stated any concerns about your memory: no         Patient Care Team   Patient Care Team:  Magali Matamoros MD as PCP - General  Magali Matamoros MD as PCP - [facility] Empaneled Provider  Rafia Maguire MD (Gastroenterology)  Manuelito Alvarez DPM (Podiatry)  Nunu Wagner MD (Neurology)  Bala Gasca MD (Neurology)  Presley Hilton MD (Ophthalmology)    Assessment/Plan   Education and counseling provided:  Are appropriate based on today's review and evaluation  End-of-Life planning (with patient's consent)  Influenza Vaccine  Screening Mammography  Diabetes screening test    Diagnoses and all orders for this visit:    1. Essential hypertension -well-controlled on current meds. Tolerating his well and will continue them. 2. Severe obesity ([address] -continue metformin for weight loss. Continue to work on diet and exercise. 3. Acquired hypothyroidism -appears euthyroid. Check levels and adjust meds as needed. 4. Mixed hyperlipidemia -LDL goal of 100. Check labs to be sure that is met. Tolerating meds well. 5. Medicare annual wellness visit, subsequent    Hand eczemawe will treat with moisturizers and topical steroids.   Other orders  -     CBC WITH AUTOMATED DIFF  -     METABOLIC PANEL, COMPREHENSIVE  -     LIPID PANEL  -     TSH AND FREE T4        Health Maintenance Due   Topic Date Due    Medicare Yearly Exam  06/10/2020

## 2020-07-23 NOTE — PATIENT INSTRUCTIONS

## 2020-07-27 ENCOUNTER — HOSPITAL ENCOUNTER (OUTPATIENT)
Dept: LAB | Age: 70
Discharge: HOME | End: 2020-07-27
Payer: MEDICARE

## 2020-07-27 PROCEDURE — 36415 COLL VENOUS BLD VENIPUNCTURE: CPT

## 2020-07-27 PROCEDURE — 80053 COMPREHEN METABOLIC PANEL: CPT

## 2020-07-27 PROCEDURE — 84443 ASSAY THYROID STIM HORMONE: CPT

## 2020-07-27 PROCEDURE — 85025 COMPLETE CBC W/AUTO DIFF WBC: CPT

## 2020-07-27 PROCEDURE — 80061 LIPID PANEL: CPT

## 2020-07-28 LAB
ALBUMIN SERPL-MCNC: 4.6 G/DL (ref 3.8–4.8)
ALBUMIN/GLOB SERPL: 1.8 {RATIO} (ref 1.2–2.2)
ALP SERPL-CCNC: 32 IU/L (ref 39–117)
ALT SERPL-CCNC: 12 IU/L (ref 0–32)
AST SERPL-CCNC: 12 IU/L (ref 0–40)
BASOPHILS # BLD AUTO: 0.1 X10E3/UL (ref 0–0.2)
BASOPHILS NFR BLD AUTO: 1 %
BILIRUB SERPL-MCNC: 0.3 MG/DL (ref 0–1.2)
BUN SERPL-MCNC: 24 MG/DL (ref 8–27)
BUN/CREAT SERPL: 25 (ref 12–28)
CALCIUM SERPL-MCNC: 9.7 MG/DL (ref 8.7–10.3)
CHLORIDE SERPL-SCNC: 102 MMOL/L (ref 96–106)
CHOLEST SERPL-MCNC: 212 MG/DL (ref 100–199)
CO2 SERPL-SCNC: 21 MMOL/L (ref 20–29)
CREAT SERPL-MCNC: 0.96 MG/DL (ref 0.57–1)
EOSINOPHIL # BLD AUTO: 0.2 X10E3/UL (ref 0–0.4)
EOSINOPHIL NFR BLD AUTO: 2 %
ERYTHROCYTE [DISTWIDTH] IN BLOOD BY AUTOMATED COUNT: 12.9 % (ref 11.7–15.4)
GLOBULIN SER CALC-MCNC: 2.6 G/DL (ref 1.5–4.5)
GLUCOSE SERPL-MCNC: 101 MG/DL (ref 65–99)
HCT VFR BLD AUTO: 38.5 % (ref 34–46.6)
HDLC SERPL-MCNC: 68 MG/DL
HGB BLD-MCNC: 12.8 G/DL (ref 11.1–15.9)
IMM GRANULOCYTES # BLD AUTO: 0 X10E3/UL (ref 0–0.1)
IMM GRANULOCYTES NFR BLD AUTO: 0 %
LDLC SERPL CALC-MCNC: 108 MG/DL (ref 0–99)
LYMPHOCYTES # BLD AUTO: 2.6 X10E3/UL (ref 0.7–3.1)
LYMPHOCYTES NFR BLD AUTO: 30 %
MCH RBC QN AUTO: 28.2 PG (ref 26.6–33)
MCHC RBC AUTO-ENTMCNC: 33.2 G/DL (ref 31.5–35.7)
MCV RBC AUTO: 85 FL (ref 79–97)
MONOCYTES # BLD AUTO: 0.5 X10E3/UL (ref 0.1–0.9)
MONOCYTES NFR BLD AUTO: 6 %
NEUTROPHILS # BLD AUTO: 5.3 X10E3/UL (ref 1.4–7)
NEUTROPHILS NFR BLD AUTO: 61 %
PLATELET # BLD AUTO: 303 X10E3/UL (ref 150–450)
POTASSIUM SERPL-SCNC: 4.1 MMOL/L (ref 3.5–5.2)
PROT SERPL-MCNC: 7.2 G/DL (ref 6–8.5)
RBC # BLD AUTO: 4.54 X10E6/UL (ref 3.77–5.28)
SODIUM SERPL-SCNC: 141 MMOL/L (ref 134–144)
T4 FREE SERPL-MCNC: 1.52 NG/DL (ref 0.82–1.77)
TRIGL SERPL-MCNC: 178 MG/DL (ref 0–149)
TSH SERPL DL<=0.005 MIU/L-ACNC: 2.91 UIU/ML (ref 0.45–4.5)
VLDLC SERPL CALC-MCNC: 36 MG/DL (ref 5–40)
WBC # BLD AUTO: 8.7 X10E3/UL (ref 3.4–10.8)

## 2020-08-25 RX ORDER — ROPINIROLE 1 MG/1
TABLET, FILM COATED ORAL
Qty: 60 TAB | Refills: 0 | Status: SHIPPED | OUTPATIENT
Start: 2020-08-25 | End: 2020-09-21

## 2020-08-25 RX ORDER — FENOFIBRATE 145 MG/1
TABLET, COATED ORAL
Qty: 90 TAB | Refills: 0 | Status: SHIPPED | OUTPATIENT
Start: 2020-08-25 | End: 2020-11-20

## 2020-09-08 RX ORDER — METFORMIN HYDROCHLORIDE 500 MG/1
TABLET, EXTENDED RELEASE ORAL
Qty: 90 TAB | Refills: 0 | Status: SHIPPED | OUTPATIENT
Start: 2020-09-08 | End: 2020-12-02

## 2020-09-09 RX ORDER — LEVOTHYROXINE SODIUM 112 UG/1
TABLET ORAL
Qty: 90 TAB | Refills: 0 | Status: SHIPPED | OUTPATIENT
Start: 2020-09-09 | End: 2020-12-02

## 2020-09-09 RX ORDER — CHLORTHALIDONE 50 MG/1
TABLET ORAL
Qty: 90 TAB | Refills: 0 | Status: SHIPPED | OUTPATIENT
Start: 2020-09-09 | End: 2020-12-02

## 2020-09-14 ENCOUNTER — HOSPITAL ENCOUNTER (EMERGENCY)
Age: 70
Discharge: HOME | End: 2020-09-14
Attending: EMERGENCY MEDICINE
Payer: MEDICARE

## 2020-09-14 ENCOUNTER — TELEPHONE (OUTPATIENT)
Dept: INTERNAL MEDICINE CLINIC | Age: 70
End: 2020-09-14

## 2020-09-14 ENCOUNTER — APPOINTMENT (OUTPATIENT)
Dept: CT IMAGING | Age: 70
End: 2020-09-14
Attending: EMERGENCY MEDICINE
Payer: MEDICARE

## 2020-09-14 VITALS
DIASTOLIC BLOOD PRESSURE: 66 MMHG | BODY MASS INDEX: 32.13 KG/M2 | RESPIRATION RATE: 18 BRPM | OXYGEN SATURATION: 100 % | WEIGHT: 170.19 LBS | SYSTOLIC BLOOD PRESSURE: 121 MMHG | HEART RATE: 69 BPM | TEMPERATURE: 97.3 F | HEIGHT: 61 IN

## 2020-09-14 DIAGNOSIS — S36.81XA INJURY OF OMENTUM, INITIAL ENCOUNTER: Primary | ICD-10-CM

## 2020-09-14 DIAGNOSIS — R10.816 EPIGASTRIC ABDOMINAL TENDERNESS WITHOUT REBOUND TENDERNESS: ICD-10-CM

## 2020-09-14 LAB
ALBUMIN SERPL-MCNC: 3.8 G/DL (ref 3.5–5)
ALBUMIN/GLOB SERPL: 1 {RATIO} (ref 1.1–2.2)
ALP SERPL-CCNC: 51 U/L (ref 45–117)
ALT SERPL-CCNC: 19 U/L (ref 12–78)
ANION GAP SERPL CALC-SCNC: 9 MMOL/L (ref 5–15)
APPEARANCE UR: ABNORMAL
AST SERPL-CCNC: 14 U/L (ref 15–37)
BACTERIA URNS QL MICRO: ABNORMAL /HPF
BASOPHILS # BLD: 0.1 K/UL (ref 0–0.1)
BASOPHILS NFR BLD: 1 % (ref 0–1)
BILIRUB SERPL-MCNC: 0.3 MG/DL (ref 0.2–1)
BILIRUB UR QL: NEGATIVE
BUN SERPL-MCNC: 32 MG/DL (ref 6–20)
BUN/CREAT SERPL: 29 (ref 12–20)
CALCIUM SERPL-MCNC: 9.1 MG/DL (ref 8.5–10.1)
CHLORIDE SERPL-SCNC: 101 MMOL/L (ref 97–108)
CO2 SERPL-SCNC: 29 MMOL/L (ref 21–32)
COLOR UR: ABNORMAL
COMMENT, HOLDF: NORMAL
CREAT SERPL-MCNC: 1.09 MG/DL (ref 0.55–1.02)
DIFFERENTIAL METHOD BLD: NORMAL
EOSINOPHIL # BLD: 0.2 K/UL (ref 0–0.4)
EOSINOPHIL NFR BLD: 2 % (ref 0–7)
EPITH CASTS URNS QL MICRO: ABNORMAL /LPF
ERYTHROCYTE [DISTWIDTH] IN BLOOD BY AUTOMATED COUNT: 13.4 % (ref 11.5–14.5)
GLOBULIN SER CALC-MCNC: 3.7 G/DL (ref 2–4)
GLUCOSE SERPL-MCNC: 100 MG/DL (ref 65–100)
GLUCOSE UR STRIP.AUTO-MCNC: NEGATIVE MG/DL
HCT VFR BLD AUTO: 40.1 % (ref 35–47)
HGB BLD-MCNC: 13 G/DL (ref 11.5–16)
HGB UR QL STRIP: NEGATIVE
IMM GRANULOCYTES # BLD AUTO: 0 K/UL (ref 0–0.04)
IMM GRANULOCYTES NFR BLD AUTO: 0 % (ref 0–0.5)
KETONES UR QL STRIP.AUTO: NEGATIVE MG/DL
LEUKOCYTE ESTERASE UR QL STRIP.AUTO: NEGATIVE
LIPASE SERPL-CCNC: 508 U/L (ref 73–393)
LYMPHOCYTES # BLD: 3 K/UL (ref 0.8–3.5)
LYMPHOCYTES NFR BLD: 29 % (ref 12–49)
MCH RBC QN AUTO: 27.8 PG (ref 26–34)
MCHC RBC AUTO-ENTMCNC: 32.4 G/DL (ref 30–36.5)
MCV RBC AUTO: 85.7 FL (ref 80–99)
MONOCYTES # BLD: 0.5 K/UL (ref 0–1)
MONOCYTES NFR BLD: 5 % (ref 5–13)
NEUTS SEG # BLD: 6.4 K/UL (ref 1.8–8)
NEUTS SEG NFR BLD: 63 % (ref 32–75)
NITRITE UR QL STRIP.AUTO: NEGATIVE
NRBC # BLD: 0 K/UL (ref 0–0.01)
NRBC BLD-RTO: 0 PER 100 WBC
PH UR STRIP: 5.5 [PH] (ref 5–8)
PLATELET # BLD AUTO: 336 K/UL (ref 150–400)
PMV BLD AUTO: 10.7 FL (ref 8.9–12.9)
POTASSIUM SERPL-SCNC: 3.4 MMOL/L (ref 3.5–5.1)
PROT SERPL-MCNC: 7.5 G/DL (ref 6.4–8.2)
PROT UR STRIP-MCNC: NEGATIVE MG/DL
RBC # BLD AUTO: 4.68 M/UL (ref 3.8–5.2)
RBC #/AREA URNS HPF: ABNORMAL /HPF (ref 0–5)
SAMPLES BEING HELD,HOLD: NORMAL
SODIUM SERPL-SCNC: 139 MMOL/L (ref 136–145)
SP GR UR REFRACTOMETRY: 1.03 (ref 1–1.03)
UROBILINOGEN UR QL STRIP.AUTO: 0.2 EU/DL (ref 0.2–1)
WBC # BLD AUTO: 10.1 K/UL (ref 3.6–11)
WBC URNS QL MICRO: ABNORMAL /HPF (ref 0–4)
YEAST BUDDING URNS QL: ABNORMAL

## 2020-09-14 PROCEDURE — 36415 COLL VENOUS BLD VENIPUNCTURE: CPT

## 2020-09-14 PROCEDURE — 96375 TX/PRO/DX INJ NEW DRUG ADDON: CPT

## 2020-09-14 PROCEDURE — 74177 CT ABD & PELVIS W/CONTRAST: CPT

## 2020-09-14 PROCEDURE — 96374 THER/PROPH/DIAG INJ IV PUSH: CPT

## 2020-09-14 PROCEDURE — 80053 COMPREHEN METABOLIC PANEL: CPT

## 2020-09-14 PROCEDURE — 99284 EMERGENCY DEPT VISIT MOD MDM: CPT

## 2020-09-14 PROCEDURE — 81001 URINALYSIS AUTO W/SCOPE: CPT

## 2020-09-14 PROCEDURE — 74011000636 HC RX REV CODE- 636

## 2020-09-14 PROCEDURE — 83690 ASSAY OF LIPASE: CPT

## 2020-09-14 PROCEDURE — 74011250637 HC RX REV CODE- 250/637: Performed by: EMERGENCY MEDICINE

## 2020-09-14 PROCEDURE — 85025 COMPLETE CBC W/AUTO DIFF WBC: CPT

## 2020-09-14 PROCEDURE — 74011250636 HC RX REV CODE- 250/636: Performed by: EMERGENCY MEDICINE

## 2020-09-14 PROCEDURE — 96361 HYDRATE IV INFUSION ADD-ON: CPT

## 2020-09-14 RX ORDER — ONDANSETRON 2 MG/ML
4 INJECTION INTRAMUSCULAR; INTRAVENOUS
Status: COMPLETED | OUTPATIENT
Start: 2020-09-14 | End: 2020-09-14

## 2020-09-14 RX ORDER — FENTANYL CITRATE 50 UG/ML
50 INJECTION, SOLUTION INTRAMUSCULAR; INTRAVENOUS
Status: COMPLETED | OUTPATIENT
Start: 2020-09-14 | End: 2020-09-14

## 2020-09-14 RX ORDER — SODIUM CHLORIDE 0.9 % (FLUSH) 0.9 %
10 SYRINGE (ML) INJECTION
Status: COMPLETED | OUTPATIENT
Start: 2020-09-14 | End: 2020-09-14

## 2020-09-14 RX ORDER — METRONIDAZOLE 250 MG/1
500 TABLET ORAL
Status: COMPLETED | OUTPATIENT
Start: 2020-09-14 | End: 2020-09-14

## 2020-09-14 RX ORDER — SODIUM CHLORIDE 9 MG/ML
50 INJECTION, SOLUTION INTRAVENOUS
Status: COMPLETED | OUTPATIENT
Start: 2020-09-14 | End: 2020-09-14

## 2020-09-14 RX ORDER — METRONIDAZOLE 500 MG/1
500 TABLET ORAL 3 TIMES DAILY
Qty: 30 TAB | Refills: 0 | Status: SHIPPED | OUTPATIENT
Start: 2020-09-14 | End: 2020-09-22 | Stop reason: ALTCHOICE

## 2020-09-14 RX ORDER — KETOROLAC TROMETHAMINE 30 MG/ML
15 INJECTION, SOLUTION INTRAMUSCULAR; INTRAVENOUS
Status: COMPLETED | OUTPATIENT
Start: 2020-09-14 | End: 2020-09-14

## 2020-09-14 RX ADMIN — ONDANSETRON 4 MG: 2 INJECTION INTRAMUSCULAR; INTRAVENOUS at 13:30

## 2020-09-14 RX ADMIN — SODIUM CHLORIDE 50 ML/HR: 900 INJECTION, SOLUTION INTRAVENOUS at 14:10

## 2020-09-14 RX ADMIN — IOPAMIDOL 100 ML: 755 INJECTION, SOLUTION INTRAVENOUS at 14:10

## 2020-09-14 RX ADMIN — KETOROLAC TROMETHAMINE 15 MG: 30 INJECTION, SOLUTION INTRAMUSCULAR at 13:30

## 2020-09-14 RX ADMIN — SODIUM CHLORIDE 1000 ML: 900 INJECTION, SOLUTION INTRAVENOUS at 13:29

## 2020-09-14 RX ADMIN — METRONIDAZOLE 500 MG: 250 TABLET ORAL at 14:45

## 2020-09-14 RX ADMIN — FENTANYL CITRATE 50 MCG: 50 INJECTION INTRAMUSCULAR; INTRAVENOUS at 13:30

## 2020-09-14 RX ADMIN — Medication 10 ML: at 14:10

## 2020-09-14 NOTE — ED NOTES
Pt ambulatory out of ED with discharge instructions and prescriptions in hand given by Dr. Arias Demarco; pt verbalized understanding of discharge paperwork and time allotted for questions. VSS. Pt alert and oriented. Pt accompanied by spouse.

## 2020-09-14 NOTE — DISCHARGE INSTRUCTIONS

## 2020-09-14 NOTE — ED TRIAGE NOTES
Triage: pt c/o RLQ pain radiating into right lower back starting x2 weeks ago but last night progressively got worse. Denies N/V/D, urinary symptoms, fever. Increased pain with movement. Normal bowel movements.

## 2020-09-14 NOTE — ED PROVIDER NOTES
Please note that this dictation was completed with [employer], the computer voice recognition software.  Quite often unanticipated grammatical, syntax, homophones, and other interpretive errors are inadvertently transcribed by the computer software.  Please disregard these errors.  Please excuse any errors that have escaped final proofreading. 76-year-old female past medical history markable for arthritis, skin cancer, chronic back pain, prediabetes, high cholesterol, hypertension, nausea and vomiting, thyroid disease, and cataract of the right eye presents the ER complaining of intermittent abdominal pain x2 weeks. Patient states it has been off and on though does occur daily typically worsens through the day. Patient states predominantly right-sided right flank. And then over the past 2 days it has become constant more intense and instead of the dull ache is now associated with sharp features as well. Patient states she has been taking Tylenol for this last Tylenol was last night at 2 AM it has not helped she has been unable to sleep times the past 2 nights was on her way [facility] when she decided she would stop here for further evaluation. Patient was driven by her . Patient has not taken anything for it today patient was unable to eat this morning secondary to the nausea and the intensity of the pain. Patient states she does not have any vaginal discharge burning with urination bloody urine had a normal bowel movement (no blood) approximately an hour and a half ago which did not change the pain. Patient denies previous episodes of this type of pain. Patient has she has had 2 C-sections.     pt denies HA, vison changes, diff swallowing, CP, SOB,  F/Ch, N/V, D/Cons or other current systemic complaints    Social/ PSH reviewed in EMR    EMR Chart Reviewed           Past Medical History:   Diagnosis Date    Arthritis     Cancer ([facility] [address]     skin    Chronic pain     back - PT    Diabetes ([facility] PRE DIABETIC    Hypercholesterolemia     Hypertension     Ill-defined condition     N/V    Ill-defined condition     right eye scheduled for cataract removed 2017    Thyroid disease        Past Surgical History:   Procedure Laterality Date    BREAST SURGERY PROCEDURE UNLISTED  ,3/2007,     breast biopsy - all benign    HX CATARACT REMOVAL Bilateral 2018    HX  SECTION      HX COLONOSCOPY  16    Dr. Collin Lloyd HX HEENT Bilateral 2020    EYE LIFT    HX HEENT  2020    eyelid surgery     HX ENT      oral sx     HX HYSTERECTOMY      HX LAP CHOLECYSTECTOMY  2017    Momin    HX MALIGNANT SKIN LESION EXCISION      skin cancer from nose - BCCA per pt    HX ORTHOPAEDIC Left 2019    Ankle srugery    HX ORTHOPAEDIC Right 2020    Dr. Kimi Blackwood      ORAL SURGERY         Family History:   Problem Relation Age of Onset    Heart Disease Father         CAD, CABG    Diabetes Father         insulin    Hypertension Mother    Maru Bird Cancer Mother         nose - skin - BCCA    Hypertension Brother     Other Brother         HEART MURMUR    Hypertension Brother     Other Brother         PVD    Kidney Disease Brother         dialysis    Diabetes Brother         insulin    Anesth Problems Neg Hx        Social History     Socioeconomic History    Marital status:      Spouse name: Not on file    Number of children: Not on file    Years of education: Not on file    Highest education level: Not on file   Occupational History    Not on file   Social Needs    Financial resource strain: Not on file    Food insecurity     Worry: Not on file     Inability: Not on file    Transportation needs     Medical: Not on file     Non-medical: Not on file   Tobacco Use    Smoking status: Never Smoker    Smokeless tobacco: Never Used   Substance and Sexual Activity    Alcohol use: Not Currently     Comment: maybe once a year - has a sip    Drug use: No    Sexual activity: Yes     Partners: Male   Lifestyle    Physical activity     Days per week: Not on file     Minutes per session: Not on file    Stress: Not on file   Relationships    Social connections     Talks on phone: Not on file     Gets together: Not on file     Attends Baptism service: Not on file     Active member of club or organization: Not on file     Attends meetings of clubs or organizations: Not on file     Relationship status: Not on file    Intimate partner violence     Fear of current or ex partner: Not on file     Emotionally abused: Not on file     Physically abused: Not on file     Forced sexual activity: Not on file   Other Topics Concern    Not on file   Social History Narrative    Not on file         ALLERGIES: Codeine; Sudafed [pseudoephedrine hcl]; and Darvocet a500 [propoxyphene n-acetaminophen]    Review of Systems   Constitutional: Positive for appetite change. Negative for activity change, chills and fever. HENT: Negative for drooling, trouble swallowing and voice change. Eyes: Negative for visual disturbance. Respiratory: Negative for cough, chest tightness and shortness of breath. Cardiovascular: Negative for palpitations and leg swelling. Gastrointestinal: Positive for abdominal pain. Negative for constipation, diarrhea, nausea and vomiting. Genitourinary: Positive for flank pain. Negative for dysuria, frequency, vaginal bleeding and vaginal discharge. Musculoskeletal: Negative for gait problem. Skin: Negative for rash. Neurological: Negative for facial asymmetry and speech difficulty. All other systems reviewed and are negative. Vitals:    09/14/20 1245   BP: (!) 156/80   Pulse: 70   Resp: 18   Temp: 97.7 °F (36.5 °C)   SpO2: 99%   Weight: 77.2 kg (170 lb 3.1 oz)   Height: 5' 1\" (1.549 m)            Physical Exam  Vitals signs and nursing note reviewed. Constitutional:       General: She is not in acute distress. Appearance: Normal appearance. She is well-developed. She is obese. She is not ill-appearing, toxic-appearing or diaphoretic. Comments: NAD, AxOx4, speaking in complete sentences       HENT:      Right Ear: External ear normal.      Left Ear: External ear normal.   Eyes:      General: No scleral icterus. Right eye: No discharge. Left eye: No discharge. Extraocular Movements: Extraocular movements intact. Conjunctiva/sclera: Conjunctivae normal.      Pupils: Pupils are equal, round, and reactive to light. Neck:      Musculoskeletal: Normal range of motion and neck supple. No muscular tenderness. Vascular: No JVD. Trachea: No tracheal deviation. Cardiovascular:      Rate and Rhythm: Normal rate and regular rhythm. Pulses: Normal pulses. Heart sounds: Normal heart sounds. No murmur. No friction rub. No gallop. Pulmonary:      Effort: Pulmonary effort is normal. No respiratory distress. Breath sounds: Normal breath sounds. No stridor. No wheezing, rhonchi or rales. Chest:      Chest wall: No tenderness. Abdominal:      General: Bowel sounds are normal.      Palpations: Abdomen is soft. Tenderness: There is no abdominal tenderness. There is no guarding or rebound. Comments: Min ttp RLQ/ R flank/ no rashes    Neg peritoneal signs; Genitourinary:     Vagina: No vaginal discharge. Comments: Pt denies urinary/ vaginal complaints  Musculoskeletal: Normal range of motion. General: No swelling, tenderness, deformity or signs of injury. Right lower leg: No edema. Left lower leg: No edema. Skin:     General: Skin is warm and dry. Capillary Refill: Capillary refill takes less than 2 seconds. Coloration: Skin is not pale. Findings: No bruising, erythema or rash. Neurological:      General: No focal deficit present. Mental Status: She is alert and oriented to person, place, and time. Cranial Nerves:  No cranial nerve deficit. Sensory: No sensory deficit. Motor: No weakness or abnormal muscle tone. Coordination: Coordination normal.      Gait: Gait normal.      Deep Tendon Reflexes: Reflexes normal.   Psychiatric:         Behavior: Behavior normal.         Thought Content: Thought content normal.          MDM       Procedures    2:37 PM  Melyssa Law's  results have been reviewed with her. She has been counseled regarding her diagnosis. She verbally conveys understanding and agreement of the signs, symptoms, diagnosis, treatment and prognosis and additionally agrees to Call/ Arrange follow up as recommended with Dr. Anika Cuellar MD in 24 - 48 hours. She also agrees with the care-plan and conveys that all of her questions have been answered. I have also put together some discharge instructions for her that include: 1) educational information regarding their diagnosis, 2) how to care for their diagnosis at home, as well a 3) list of reasons why they would want to return to the ED prior to their follow-up appointment, should their condition change or for concerns.

## 2020-09-15 ENCOUNTER — OFFICE VISIT (OUTPATIENT)
Dept: INTERNAL MEDICINE CLINIC | Age: 70
End: 2020-09-15
Payer: MEDICARE

## 2020-09-15 ENCOUNTER — HOSPITAL ENCOUNTER (OUTPATIENT)
Dept: LAB | Age: 70
Discharge: HOME | End: 2020-09-15
Payer: MEDICARE

## 2020-09-15 ENCOUNTER — TELEPHONE (OUTPATIENT)
Dept: INTERNAL MEDICINE CLINIC | Age: 70
End: 2020-09-15

## 2020-09-15 VITALS
WEIGHT: 168 LBS | OXYGEN SATURATION: 97 % | DIASTOLIC BLOOD PRESSURE: 77 MMHG | RESPIRATION RATE: 10 BRPM | HEIGHT: 61 IN | BODY MASS INDEX: 31.72 KG/M2 | SYSTOLIC BLOOD PRESSURE: 133 MMHG | HEART RATE: 65 BPM | TEMPERATURE: 97.5 F

## 2020-09-15 DIAGNOSIS — L30.9 HAND ECZEMA: ICD-10-CM

## 2020-09-15 DIAGNOSIS — K85.90 ACUTE PANCREATITIS, UNSPECIFIED COMPLICATION STATUS, UNSPECIFIED PANCREATITIS TYPE: Primary | ICD-10-CM

## 2020-09-15 DIAGNOSIS — R10.13 EPIGASTRIC PAIN: Primary | ICD-10-CM

## 2020-09-15 PROCEDURE — 3017F COLORECTAL CA SCREEN DOC REV: CPT | Performed by: INTERNAL MEDICINE

## 2020-09-15 PROCEDURE — G8427 DOCREV CUR MEDS BY ELIG CLIN: HCPCS | Performed by: INTERNAL MEDICINE

## 2020-09-15 PROCEDURE — 36415 COLL VENOUS BLD VENIPUNCTURE: CPT

## 2020-09-15 PROCEDURE — 1090F PRES/ABSN URINE INCON ASSESS: CPT | Performed by: INTERNAL MEDICINE

## 2020-09-15 PROCEDURE — G8754 DIAS BP LESS 90: HCPCS | Performed by: INTERNAL MEDICINE

## 2020-09-15 PROCEDURE — G8752 SYS BP LESS 140: HCPCS | Performed by: INTERNAL MEDICINE

## 2020-09-15 PROCEDURE — 83690 ASSAY OF LIPASE: CPT

## 2020-09-15 PROCEDURE — 80076 HEPATIC FUNCTION PANEL: CPT

## 2020-09-15 PROCEDURE — 99213 OFFICE O/P EST LOW 20 MIN: CPT | Performed by: INTERNAL MEDICINE

## 2020-09-15 PROCEDURE — G8536 NO DOC ELDER MAL SCRN: HCPCS | Performed by: INTERNAL MEDICINE

## 2020-09-15 PROCEDURE — G0463 HOSPITAL OUTPT CLINIC VISIT: HCPCS | Performed by: INTERNAL MEDICINE

## 2020-09-15 PROCEDURE — G8432 DEP SCR NOT DOC, RNG: HCPCS | Performed by: INTERNAL MEDICINE

## 2020-09-15 PROCEDURE — G8417 CALC BMI ABV UP PARAM F/U: HCPCS | Performed by: INTERNAL MEDICINE

## 2020-09-15 PROCEDURE — G8399 PT W/DXA RESULTS DOCUMENT: HCPCS | Performed by: INTERNAL MEDICINE

## 2020-09-15 PROCEDURE — G9899 SCRN MAM PERF RSLTS DOC: HCPCS | Performed by: INTERNAL MEDICINE

## 2020-09-15 PROCEDURE — 1101F PT FALLS ASSESS-DOCD LE1/YR: CPT | Performed by: INTERNAL MEDICINE

## 2020-09-15 RX ORDER — TRIAMCINOLONE ACETONIDE 1 MG/G
CREAM TOPICAL 2 TIMES DAILY
Qty: 45 G | Refills: 1 | Status: SHIPPED | OUTPATIENT
Start: 2020-09-15 | End: 2021-06-08 | Stop reason: SDUPTHER

## 2020-09-15 NOTE — PROGRESS NOTES
HPI:  Adrienne Sutton is a 72y.o. year old female who is here for a 2 day history of pain in the epigastrium with some pain in the right flank with radiation to the back. No relation to meals. Seen in the ER yesterday and CT showed stranding in the omentum. Lipase was more than 500 but the ER diagnosed an inflamed hernia and treated her with flagyl. Pain is better today. No fever or chills. No nausea or vomiting. No melena or hematochezia. No pain in the legs or radiation of the pain to the legs. No change in bladder issues. Past Medical History:   Diagnosis Date    Arthritis     Cancer ([facility] [address]     skin    Chronic pain     back - PT    Diabetes ([facility] [address]     PRE DIABETIC    Hypercholesterolemia     Hypertension     Ill-defined condition     N/V    Ill-defined condition     right eye scheduled for cataract removed 2017    Thyroid disease        Past Surgical History:   Procedure Laterality Date    BREAST SURGERY PROCEDURE UNLISTED  ,3/2007,     breast biopsy - all benign    HX CATARACT REMOVAL Bilateral 2018    HX  SECTION      HX COLONOSCOPY  16    Dr. Cricket Espinosa HX HEENT Bilateral 2020    EYE LIFT    HX HEENT  2020    eyelid surgery     HX ENT      oral sx     HX HYSTERECTOMY      HX LAP CHOLECYSTECTOMY  2017    [facility]omin    HX MALIGNANT SKIN LESION EXCISION      skin cancer from nose - BCCA per pt    HX ORTHOPAEDIC Left 2019    Ankle srugery    HX ORTHOPAEDIC Right 2020    Dr. Kendal Dubon       Prior to Admission medications    Medication Sig Start Date End Date Taking? Authorizing Provider   triamcinolone acetonide (KENALOG) 0.1 % topical cream Apply  to affected area two (2) times a day. use thin layer 9/15/20  Yes Werner Mejia MD   metroNIDAZOLE (FlagyL) 500 mg tablet Take 1 Tab by mouth three (3) times daily for 10 days.  20 Yes Smitha Lovelace MD   levothyroxine (SYNTHROID) 112 mcg tablet TAKE 1 TABLET BY MOUTH BEFORE BREAKFAST . APPOINTMENT REQUIRED FOR FUTURE REFILLS 9/9/20  Yes Cornel Paez MD   chlorthalidone (HYGROTEN) 50 mg tablet Take 1 tablet by mouth once daily 9/9/20  Yes Cornel Paez MD   conjugated estrogens (Premarin) 0.625 mg tablet Take 1 tablet by mouth once daily 9/8/20  Yes Cornel Paez MD   metFORMIN ER (GLUCOPHAGE XR) 500 mg tablet TAKE 1 TABLET BY MOUTH ONCE DAILY WITH SUPPER 9/8/20  Yes Cornel Paez MD   fenofibrate nanocrystallized (TRICOR) 145 mg tablet Take 1 tablet by mouth once daily 8/25/20  Yes Cornel Paez MD   rOPINIRole (REQUIP) 1 mg tablet Take 1 tablet by mouth twice daily 8/25/20  Yes Elvira Robledo, NP   cloNIDine HCL (CATAPRES) 0.1 mg tablet Take 1 Tab by mouth two (2) times a day. 2/9/20  Yes Cornel Paez MD   ketoconazole (NIZORAL) 2 % topical cream  4/16/18  Yes Provider, Historical   Cholecalciferol, Vitamin D3, (VITAMIN D3) 2,000 unit cap capsule Take 2,000 Units by mouth daily. 4/16/18  Yes Cornel Paez MD   vitamin E (AQUA GEMS) 400 unit capsule Take 400 Units by mouth daily. Yes Provider, Historical   GLUCOSAMINE/CHONDROITIN SULF A (GLUCOSAMINE-CHONDROITIN PO) Take  by mouth. 1500mg/1200mg per 2 pills. Takes one pill once daily. Yes Provider, Historical   loratadine (ALLERCLEAR) 10 mg tablet Take 10 mg by mouth daily as needed for Allergies. Yes Provider, Historical   triamcinolone acetonide (KENALOG) 0.1 % topical cream Apply  to affected area two (2) times a day.  use thin layer 7/23/20 9/15/20  Cornel Paez MD       Social History     Socioeconomic History    Marital status:      Spouse name: Not on file    Number of children: Not on file    Years of education: Not on file    Highest education level: Not on file   Occupational History    Not on file   Social Needs    Financial resource strain: Not on file    Food insecurity     Worry: Not on file     Inability: Not on file    Transportation needs     Medical: Not on file     Non-medical: Not on file   Tobacco Use    Smoking status: Never Smoker    Smokeless tobacco: Never Used   Substance and Sexual Activity    Alcohol use: Not Currently     Comment: maybe once a year - has a sip    Drug use: No    Sexual activity: Yes     Partners: Male   Lifestyle    Physical activity     Days per week: Not on file     Minutes per session: Not on file    Stress: Not on file   Relationships    Social connections     Talks on phone: Not on file     Gets together: Not on file     Attends Gnosticist service: Not on file     Active member of club or organization: Not on file     Attends meetings of clubs or organizations: Not on file     Relationship status: Not on file    Intimate partner violence     Fear of current or ex partner: Not on file     Emotionally abused: Not on file     Physically abused: Not on file     Forced sexual activity: Not on file   Other Topics Concern    Not on file   Social History Narrative    Not on file          ROS  Per HPI    Visit Vitals  /77   Pulse 65   Temp 97.5 °F (36.4 °C) (Temporal)   Resp 10   Ht 5' 1\" (1.549 m)   Wt 168 lb (76.2 kg)   SpO2 97%   BMI 31.74 kg/m²         Physical Exam   Physical Examination: General appearance - alert, well appearing, and in no distress  Chest - clear to auscultation, no wheezes, rales or rhonchi, symmetric air entry  Heart - normal rate, regular rhythm, normal S1, S2, no murmurs, rubs, clicks or gallops  Abdomen - tenderness noted mildly in the epigastrium  no rebound tenderness noted  bowel sounds normal  Back exam - mild tenderness over the right back muscles. Musculoskeletal - no joint tenderness, deformity or swelling  Extremities - peripheral pulses normal, no pedal edema, no clubbing or cyanosis      Assessment/Plan:  Diagnoses and all orders for this visit:    1. Epigastric pain - ?  Pancreatitis versus omentum inflammation - will recheck enzymes today. Discussed the CT with Dr. Ayana Harman who read it and he says he does not see any pancreatitis or other changes. If symptoms persist, or enzymes higher, consider MRCP.   -     LIPASE  -     HEPATIC FUNCTION PANEL    2. Hand eczema  -     triamcinolone acetonide (KENALOG) 0.1 % topical cream; Apply  to affected area two (2) times a day. use thin layer              Advised her to call back or return to office if symptoms worsen/change/persist.  Discussed expected course/resolution/complications of diagnosis in detail with patient. Medication risks/benefits/costs/interactions/alternatives discussed with patient. She was given an after visit summary which includes diagnoses, current medications, & vitals. She expressed understanding with the diagnosis and plan.

## 2020-09-16 LAB
ALBUMIN SERPL-MCNC: 4.4 G/DL (ref 3.8–4.8)
ALP SERPL-CCNC: 39 IU/L (ref 39–117)
ALT SERPL-CCNC: 9 IU/L (ref 0–32)
AST SERPL-CCNC: 10 IU/L (ref 0–40)
BILIRUB DIRECT SERPL-MCNC: 0.1 MG/DL (ref 0–0.4)
BILIRUB SERPL-MCNC: <0.2 MG/DL (ref 0–1.2)
LIPASE SERPL-CCNC: 62 U/L (ref 14–72)
PROT SERPL-MCNC: 6.8 G/DL (ref 6–8.5)

## 2020-09-16 NOTE — TELEPHONE ENCOUNTER
Returned call to patient and given  # for scheduling. She reports 8/10 abdominal pain and wonders if Dr. Carlos A Saavedra can prescribe something for pain.

## 2020-09-16 NOTE — TELEPHONE ENCOUNTER
Attempted to contact patient, unsuccessful.    -Left message on personal vmail per Dr. Chang beyer for pain, advised to return call if questions.

## 2020-09-16 NOTE — TELEPHONE ENCOUNTER
Spoke with patient and advised of Dr. Ishmael Sage result note. Pt says the pain returned yesterday and she was up all night. Advised will check with Dr. Ishmael Sage about next steps ? MRCP vs. Another appt and return call, pt verbalized understanding.

## 2020-09-16 NOTE — TELEPHONE ENCOUNTER
Orders Placed This Encounter    MRI ABD W MRCP W WO CONT     Standing Status:   Future     Standing Expiration Date:   9/16/2021     Order Specific Question:   STAT Creatinine as indicated     Answer:   Yes     Order Specific Question:   Reason for Exam     Answer:   pancreatitis     The above orders were approved via [facility] per Dr. Jt Tejeda, III.

## 2020-09-18 ENCOUNTER — HOSPITAL ENCOUNTER (OUTPATIENT)
Dept: MRI IMAGING | Age: 70
Discharge: HOME | End: 2020-09-18
Attending: INTERNAL MEDICINE
Payer: MEDICARE

## 2020-09-18 VITALS — WEIGHT: 163 LBS | BODY MASS INDEX: 30.8 KG/M2

## 2020-09-18 DIAGNOSIS — K85.90 ACUTE PANCREATITIS, UNSPECIFIED COMPLICATION STATUS, UNSPECIFIED PANCREATITIS TYPE: ICD-10-CM

## 2020-09-18 PROCEDURE — A9585 GADOBUTROL INJECTION: HCPCS | Performed by: INTERNAL MEDICINE

## 2020-09-18 PROCEDURE — 74183 MRI ABD W/O CNTR FLWD CNTR: CPT

## 2020-09-18 PROCEDURE — 74011250636 HC RX REV CODE- 250/636: Performed by: INTERNAL MEDICINE

## 2020-09-18 RX ADMIN — GADOBUTROL 7.5 ML: 604.72 INJECTION INTRAVENOUS at 14:37

## 2020-09-21 ENCOUNTER — TELEPHONE (OUTPATIENT)
Dept: INTERNAL MEDICINE CLINIC | Age: 70
End: 2020-09-21

## 2020-09-21 RX ORDER — ROPINIROLE 1 MG/1
TABLET, FILM COATED ORAL
Qty: 60 TAB | Refills: 0 | Status: SHIPPED | OUTPATIENT
Start: 2020-09-21 | End: 2020-10-19

## 2020-09-21 NOTE — TELEPHONE ENCOUNTER
----- Message from Alyssa Andrews MD sent at 9/20/2020  2:30 PM EDT -----  Notify all OK - Appt if pain persists.

## 2020-09-21 NOTE — TELEPHONE ENCOUNTER
Spoke with patient and advised results OK. She is continuing to have abdominal pain.   Appt made for tomorrow @ 8 AM.

## 2020-09-22 ENCOUNTER — OFFICE VISIT (OUTPATIENT)
Dept: INTERNAL MEDICINE CLINIC | Age: 70
End: 2020-09-22
Payer: MEDICARE

## 2020-09-22 VITALS
SYSTOLIC BLOOD PRESSURE: 144 MMHG | BODY MASS INDEX: 31.45 KG/M2 | WEIGHT: 166.6 LBS | TEMPERATURE: 97.3 F | HEART RATE: 74 BPM | OXYGEN SATURATION: 97 % | RESPIRATION RATE: 12 BRPM | HEIGHT: 61 IN | DIASTOLIC BLOOD PRESSURE: 83 MMHG

## 2020-09-22 DIAGNOSIS — G89.29 CHRONIC BILATERAL LOW BACK PAIN, UNSPECIFIED WHETHER SCIATICA PRESENT: ICD-10-CM

## 2020-09-22 DIAGNOSIS — M54.14 THORACIC RADICULOPATHY: Primary | ICD-10-CM

## 2020-09-22 DIAGNOSIS — G57.93 NEUROPATHIC PAIN OF BOTH FEET: ICD-10-CM

## 2020-09-22 DIAGNOSIS — Z23 ENCOUNTER FOR IMMUNIZATION: ICD-10-CM

## 2020-09-22 DIAGNOSIS — M54.50 CHRONIC BILATERAL LOW BACK PAIN, UNSPECIFIED WHETHER SCIATICA PRESENT: ICD-10-CM

## 2020-09-22 DIAGNOSIS — M19.019 SHOULDER ARTHRITIS: ICD-10-CM

## 2020-09-22 PROCEDURE — G8753 SYS BP > OR = 140: HCPCS | Performed by: INTERNAL MEDICINE

## 2020-09-22 PROCEDURE — G8754 DIAS BP LESS 90: HCPCS | Performed by: INTERNAL MEDICINE

## 2020-09-22 PROCEDURE — 1090F PRES/ABSN URINE INCON ASSESS: CPT | Performed by: INTERNAL MEDICINE

## 2020-09-22 PROCEDURE — 99214 OFFICE O/P EST MOD 30 MIN: CPT | Performed by: INTERNAL MEDICINE

## 2020-09-22 PROCEDURE — G8432 DEP SCR NOT DOC, RNG: HCPCS | Performed by: INTERNAL MEDICINE

## 2020-09-22 PROCEDURE — 90694 VACC AIIV4 NO PRSRV 0.5ML IM: CPT

## 2020-09-22 PROCEDURE — 3017F COLORECTAL CA SCREEN DOC REV: CPT | Performed by: INTERNAL MEDICINE

## 2020-09-22 PROCEDURE — 1101F PT FALLS ASSESS-DOCD LE1/YR: CPT | Performed by: INTERNAL MEDICINE

## 2020-09-22 PROCEDURE — G8399 PT W/DXA RESULTS DOCUMENT: HCPCS | Performed by: INTERNAL MEDICINE

## 2020-09-22 PROCEDURE — G8536 NO DOC ELDER MAL SCRN: HCPCS | Performed by: INTERNAL MEDICINE

## 2020-09-22 PROCEDURE — G9899 SCRN MAM PERF RSLTS DOC: HCPCS | Performed by: INTERNAL MEDICINE

## 2020-09-22 PROCEDURE — G8427 DOCREV CUR MEDS BY ELIG CLIN: HCPCS | Performed by: INTERNAL MEDICINE

## 2020-09-22 PROCEDURE — G8417 CALC BMI ABV UP PARAM F/U: HCPCS | Performed by: INTERNAL MEDICINE

## 2020-09-22 PROCEDURE — G0463 HOSPITAL OUTPT CLINIC VISIT: HCPCS | Performed by: INTERNAL MEDICINE

## 2020-09-22 RX ORDER — GABAPENTIN 100 MG/1
100 CAPSULE ORAL 3 TIMES DAILY
Qty: 90 CAP | Refills: 0 | Status: SHIPPED | OUTPATIENT
Start: 2020-09-22 | End: 2021-01-12

## 2020-09-27 ENCOUNTER — PATIENT MESSAGE (OUTPATIENT)
Dept: INTERNAL MEDICINE CLINIC | Age: 70
End: 2020-09-27

## 2020-09-27 DIAGNOSIS — M54.14 THORACIC RADICULOPATHY: Primary | ICD-10-CM

## 2020-10-02 ENCOUNTER — HOSPITAL ENCOUNTER (OUTPATIENT)
Dept: MRI IMAGING | Age: 70
Discharge: HOME | End: 2020-10-02
Attending: INTERNAL MEDICINE
Payer: MEDICARE

## 2020-10-02 DIAGNOSIS — M54.14 THORACIC RADICULOPATHY: ICD-10-CM

## 2020-10-02 PROCEDURE — 72146 MRI CHEST SPINE W/O DYE: CPT

## 2020-10-05 ENCOUNTER — TELEPHONE (OUTPATIENT)
Dept: INTERNAL MEDICINE CLINIC | Age: 70
End: 2020-10-05

## 2020-10-05 NOTE — PROGRESS NOTES
Sent patient a message on [place]. Notes and imaging have been faxed to Dr. Vargas Gip office. They need to review records before they can make an appointment. They will call patient.

## 2020-10-05 NOTE — TELEPHONE ENCOUNTER
Maryanne Hayes (Self) [phone] (H)     Pt says that dr Keiko Gil was suppose to set up an appt with someone for injections for her back and wonders what the status is on this?

## 2020-10-08 ENCOUNTER — TELEPHONE (OUTPATIENT)
Dept: INTERNAL MEDICINE CLINIC | Age: 70
End: 2020-10-08

## 2020-10-08 DIAGNOSIS — M54.14 THORACIC RADICULOPATHY: Primary | ICD-10-CM

## 2020-10-08 DIAGNOSIS — M51.34 DDD (DEGENERATIVE DISC DISEASE), THORACIC: ICD-10-CM

## 2020-10-08 DIAGNOSIS — M19.09 OSTEOARTHRITIS OF OTHER SITE, UNSPECIFIED OSTEOARTHRITIS TYPE: ICD-10-CM

## 2020-10-08 NOTE — TELEPHONE ENCOUNTER
Returned call to patient she says she is unhappy with the way Dr. Aponte Lat office has handled getting her an appt - says earliest they could do was no until November. Patient contacted [facility] @ [facility] who she saw years ago and is scheduled to see Dr. Aristides Anthony on October 21st.  Request referral, last office note, and imaging be faxed to his attn @ F# [phone].

## 2020-10-19 RX ORDER — ROPINIROLE 1 MG/1
TABLET, FILM COATED ORAL
Qty: 60 TAB | Refills: 0 | Status: SHIPPED | OUTPATIENT
Start: 2020-10-19 | End: 2020-11-23

## 2020-10-21 ENCOUNTER — TELEPHONE (OUTPATIENT)
Dept: INTERNAL MEDICINE CLINIC | Age: 70
End: 2020-10-21

## 2020-10-21 NOTE — TELEPHONE ENCOUNTER
Pt wants you to know that  She has an appt with dr Priya Fowler  On Friday  Do not call the office thanks for your help

## 2020-11-20 RX ORDER — FENOFIBRATE 145 MG/1
TABLET, COATED ORAL
Qty: 90 TAB | Refills: 0 | Status: SHIPPED | OUTPATIENT
Start: 2020-11-20 | End: 2021-02-15

## 2020-11-23 RX ORDER — ROPINIROLE 1 MG/1
TABLET, FILM COATED ORAL
Qty: 60 TAB | Refills: 0 | Status: SHIPPED | OUTPATIENT
Start: 2020-11-23 | End: 2020-12-22

## 2020-12-02 RX ORDER — LEVOTHYROXINE SODIUM 112 UG/1
TABLET ORAL
Qty: 90 TAB | Refills: 0 | Status: SHIPPED | OUTPATIENT
Start: 2020-12-02 | End: 2021-03-04

## 2020-12-02 RX ORDER — METFORMIN HYDROCHLORIDE 500 MG/1
TABLET, EXTENDED RELEASE ORAL
Qty: 90 TAB | Refills: 0 | Status: SHIPPED | OUTPATIENT
Start: 2020-12-02 | End: 2021-03-04

## 2020-12-02 RX ORDER — CHLORTHALIDONE 50 MG/1
TABLET ORAL
Qty: 90 TAB | Refills: 0 | Status: SHIPPED | OUTPATIENT
Start: 2020-12-02 | End: 2021-03-04

## 2020-12-22 RX ORDER — ROPINIROLE 1 MG/1
TABLET, FILM COATED ORAL
Qty: 60 TAB | Refills: 0 | Status: SHIPPED | OUTPATIENT
Start: 2020-12-22 | End: 2021-01-26

## 2021-01-12 DIAGNOSIS — M54.14 THORACIC RADICULOPATHY: ICD-10-CM

## 2021-01-12 RX ORDER — GABAPENTIN 100 MG/1
CAPSULE ORAL
Qty: 90 CAP | Refills: 0 | Status: SHIPPED | OUTPATIENT
Start: 2021-01-12 | End: 2021-03-29

## 2021-01-24 LAB — SARS-COV-2, NAA: NOT DETECTED

## 2021-01-26 RX ORDER — ROPINIROLE 1 MG/1
TABLET, FILM COATED ORAL
Qty: 60 TAB | Refills: 0 | Status: SHIPPED | OUTPATIENT
Start: 2021-01-26 | End: 2021-02-15

## 2021-01-27 ENCOUNTER — IMMUNIZATION (OUTPATIENT)
Dept: INTERNAL MEDICINE CLINIC | Age: 71
End: 2021-01-27
Payer: MEDICARE

## 2021-01-27 DIAGNOSIS — Z23 ENCOUNTER FOR IMMUNIZATION: Primary | ICD-10-CM

## 2021-01-27 PROCEDURE — 91301 COVID-19, MRNA, LNP-S, PF, 100MCG/0.5ML DOSE(MODERNA): CPT | Performed by: FAMILY MEDICINE

## 2021-01-27 PROCEDURE — 0011A PR IMM ADMN SARSCOV2 100 MCG/0.5 ML 1ST DOSE: CPT | Performed by: FAMILY MEDICINE

## 2021-02-12 ENCOUNTER — TRANSCRIBE ORDER (OUTPATIENT)
Dept: REGISTRATION | Age: 71
End: 2021-02-12

## 2021-02-12 ENCOUNTER — HOSPITAL ENCOUNTER (OUTPATIENT)
Dept: PREADMISSION TESTING | Age: 71
Discharge: HOME | End: 2021-02-12
Payer: MEDICARE

## 2021-02-12 DIAGNOSIS — Z01.812 PRE-PROCEDURE LAB EXAM: ICD-10-CM

## 2021-02-12 DIAGNOSIS — Z01.812 PRE-PROCEDURE LAB EXAM: Primary | ICD-10-CM

## 2021-02-12 PROCEDURE — U0003 INFECTIOUS AGENT DETECTION BY NUCLEIC ACID (DNA OR RNA); SEVERE ACUTE RESPIRATORY SYNDROME CORONAVIRUS 2 (SARS-COV-2) (CORONAVIRUS DISEASE [COVID-19]), AMPLIFIED PROBE TECHNIQUE, MAKING USE OF HIGH THROUGHPUT TECHNOLOGIES AS DESCRIBED BY CMS-2020-01-R: HCPCS

## 2021-02-13 LAB — SARS-COV-2, COV2NT: NOT DETECTED

## 2021-02-15 RX ORDER — CLONIDINE HYDROCHLORIDE 0.1 MG/1
TABLET ORAL
Qty: 180 TAB | Refills: 0 | Status: SHIPPED | OUTPATIENT
Start: 2021-02-15 | End: 2021-05-18

## 2021-02-15 RX ORDER — ROPINIROLE 1 MG/1
TABLET, FILM COATED ORAL
Qty: 60 TAB | Refills: 0 | Status: SHIPPED | OUTPATIENT
Start: 2021-02-15 | End: 2021-03-29

## 2021-02-15 RX ORDER — FENOFIBRATE 145 MG/1
TABLET, COATED ORAL
Qty: 90 TAB | Refills: 0 | Status: SHIPPED | OUTPATIENT
Start: 2021-02-15 | End: 2021-05-18

## 2021-02-17 ENCOUNTER — ANESTHESIA (OUTPATIENT)
Dept: ENDOSCOPY | Age: 71
End: 2021-02-17
Payer: MEDICARE

## 2021-02-17 ENCOUNTER — ANESTHESIA EVENT (OUTPATIENT)
Dept: ENDOSCOPY | Age: 71
End: 2021-02-17
Payer: MEDICARE

## 2021-02-17 ENCOUNTER — HOSPITAL ENCOUNTER (OUTPATIENT)
Age: 71
Setting detail: OUTPATIENT SURGERY
Discharge: HOME | End: 2021-02-17
Attending: INTERNAL MEDICINE | Admitting: INTERNAL MEDICINE
Payer: MEDICARE

## 2021-02-17 VITALS
DIASTOLIC BLOOD PRESSURE: 73 MMHG | BODY MASS INDEX: 30.58 KG/M2 | HEIGHT: 61 IN | OXYGEN SATURATION: 99 % | RESPIRATION RATE: 21 BRPM | HEART RATE: 60 BPM | TEMPERATURE: 97.3 F | WEIGHT: 162 LBS | SYSTOLIC BLOOD PRESSURE: 139 MMHG

## 2021-02-17 PROCEDURE — 74011250636 HC RX REV CODE- 250/636: Performed by: INTERNAL MEDICINE

## 2021-02-17 PROCEDURE — 74011250636 HC RX REV CODE- 250/636: Performed by: NURSE ANESTHETIST, CERTIFIED REGISTERED

## 2021-02-17 PROCEDURE — 74011000250 HC RX REV CODE- 250: Performed by: NURSE ANESTHETIST, CERTIFIED REGISTERED

## 2021-02-17 PROCEDURE — 76060000031 HC ANESTHESIA FIRST 0.5 HR: Performed by: INTERNAL MEDICINE

## 2021-02-17 PROCEDURE — 76040000019: Performed by: INTERNAL MEDICINE

## 2021-02-17 RX ORDER — MIDAZOLAM HYDROCHLORIDE 1 MG/ML
.25-5 INJECTION, SOLUTION INTRAMUSCULAR; INTRAVENOUS
Status: DISCONTINUED | OUTPATIENT
Start: 2021-02-17 | End: 2021-02-17 | Stop reason: HOSPADM

## 2021-02-17 RX ORDER — EPINEPHRINE 0.1 MG/ML
1 INJECTION INTRACARDIAC; INTRAVENOUS
Status: DISCONTINUED | OUTPATIENT
Start: 2021-02-17 | End: 2021-02-17 | Stop reason: HOSPADM

## 2021-02-17 RX ORDER — SODIUM CHLORIDE 0.9 % (FLUSH) 0.9 %
5-40 SYRINGE (ML) INJECTION EVERY 8 HOURS
Status: DISCONTINUED | OUTPATIENT
Start: 2021-02-17 | End: 2021-02-17 | Stop reason: HOSPADM

## 2021-02-17 RX ORDER — FENTANYL CITRATE 50 UG/ML
200 INJECTION, SOLUTION INTRAMUSCULAR; INTRAVENOUS
Status: DISCONTINUED | OUTPATIENT
Start: 2021-02-17 | End: 2021-02-17 | Stop reason: HOSPADM

## 2021-02-17 RX ORDER — SODIUM CHLORIDE 9 MG/ML
150 INJECTION, SOLUTION INTRAVENOUS CONTINUOUS
Status: DISCONTINUED | OUTPATIENT
Start: 2021-02-17 | End: 2021-02-17 | Stop reason: HOSPADM

## 2021-02-17 RX ORDER — SODIUM CHLORIDE 9 MG/ML
INJECTION, SOLUTION INTRAVENOUS
Status: DISCONTINUED | OUTPATIENT
Start: 2021-02-17 | End: 2021-02-17 | Stop reason: HOSPADM

## 2021-02-17 RX ORDER — SODIUM CHLORIDE 0.9 % (FLUSH) 0.9 %
5-40 SYRINGE (ML) INJECTION AS NEEDED
Status: DISCONTINUED | OUTPATIENT
Start: 2021-02-17 | End: 2021-02-17 | Stop reason: HOSPADM

## 2021-02-17 RX ORDER — LIDOCAINE HYDROCHLORIDE 20 MG/ML
INJECTION, SOLUTION EPIDURAL; INFILTRATION; INTRACAUDAL; PERINEURAL AS NEEDED
Status: DISCONTINUED | OUTPATIENT
Start: 2021-02-17 | End: 2021-02-17 | Stop reason: HOSPADM

## 2021-02-17 RX ORDER — ATROPINE SULFATE 0.1 MG/ML
0.5 INJECTION INTRAVENOUS
Status: DISCONTINUED | OUTPATIENT
Start: 2021-02-17 | End: 2021-02-17 | Stop reason: HOSPADM

## 2021-02-17 RX ORDER — PROPOFOL 10 MG/ML
INJECTION, EMULSION INTRAVENOUS AS NEEDED
Status: DISCONTINUED | OUTPATIENT
Start: 2021-02-17 | End: 2021-02-17 | Stop reason: HOSPADM

## 2021-02-17 RX ORDER — DEXTROMETHORPHAN/PSEUDOEPHED 2.5-7.5/.8
1.2 DROPS ORAL
Status: DISCONTINUED | OUTPATIENT
Start: 2021-02-17 | End: 2021-02-17 | Stop reason: HOSPADM

## 2021-02-17 RX ADMIN — PROPOFOL 50 MG: 10 INJECTION, EMULSION INTRAVENOUS at 12:18

## 2021-02-17 RX ADMIN — PROPOFOL 50 MG: 10 INJECTION, EMULSION INTRAVENOUS at 12:10

## 2021-02-17 RX ADMIN — PROPOFOL 100 MG: 10 INJECTION, EMULSION INTRAVENOUS at 12:07

## 2021-02-17 RX ADMIN — PROPOFOL 50 MG: 10 INJECTION, EMULSION INTRAVENOUS at 12:21

## 2021-02-17 RX ADMIN — PROPOFOL 50 MG: 10 INJECTION, EMULSION INTRAVENOUS at 12:13

## 2021-02-17 RX ADMIN — PROPOFOL 50 MG: 10 INJECTION, EMULSION INTRAVENOUS at 12:15

## 2021-02-17 RX ADMIN — LIDOCAINE HYDROCHLORIDE 60 MG: 20 INJECTION, SOLUTION EPIDURAL; INFILTRATION; INTRACAUDAL; PERINEURAL at 12:07

## 2021-02-17 RX ADMIN — SODIUM CHLORIDE: 900 INJECTION, SOLUTION INTRAVENOUS at 11:35

## 2021-02-17 NOTE — ANESTHESIA POSTPROCEDURE EVALUATION
Post-Anesthesia Evaluation and Assessment    Patient: Gale Turner MRN: [id number]  SSN: [id number]    YOB: 1950  Age: 80 y.o. Sex: female      I have evaluated the patient and they are stable and ready for discharge from the PACU. Cardiovascular Function/Vital Signs  Visit Vitals  /73   Pulse 60   Temp 36.3 °C (97.3 °F)   Resp 21   Ht 5' 1\" (1.549 m)   Wt 73.5 kg (162 lb)   SpO2 99%   Breastfeeding No   BMI 30.61 kg/m²       Patient is status post MAC anesthesia for Procedure(s):  COLONOSCOPY   :-.    Nausea/Vomiting: None    Postoperative hydration reviewed and adequate. Pain:  Pain Scale 1: Numeric (0 - 10) (02/17/21 1250)  Pain Intensity 1: 0 (02/17/21 1250)   Managed    Neurological Status: At baseline    Mental Status, Level of Consciousness: Alert and  oriented to person, place, and time    Pulmonary Status:   O2 Device: Room air (02/17/21 1250)   Adequate oxygenation and airway patent    Complications related to anesthesia: None    Post-anesthesia assessment completed. No concerns    Signed By: Laisha Fitch MD     February 17, 2021              Procedure(s):  COLONOSCOPY   :-.    MAC    <BSHSIANPOST>    INITIAL Post-op Vital signs:   Vitals Value Taken Time   /73 02/17/21 1245   Temp 36.3 °C (97.3 °F) 02/17/21 1230   Pulse 60 02/17/21 1247   Resp 18 02/17/21 1247   SpO2 100 % 02/17/21 1247   Vitals shown include unvalidated device data.

## 2021-02-17 NOTE — ANESTHESIA PREPROCEDURE EVALUATION
Relevant Problems   CARDIOVASCULAR   (+) Essential hypertension      ENDOCRINE   (+) Acquired hypothyroidism   (+) Severe obesity (HCC)       Anesthetic History   No history of anesthetic complications            Review of Systems / Medical History  Patient summary reviewed, nursing notes reviewed and pertinent labs reviewed    Pulmonary  Within defined limits                 Neuro/Psych   Within defined limits           Cardiovascular  Within defined limits  Hypertension              Exercise tolerance: >4 METS     GI/Hepatic/Renal  Within defined limits              Endo/Other  Within defined limits  Diabetes: type 2  Hypothyroidism: well controlled  Arthritis     Other Findings              Physical Exam    Airway  Mallampati: II  TM Distance: > 6 cm  Neck ROM: normal range of motion   Mouth opening: Normal     Cardiovascular  Regular rate and rhythm,  S1 and S2 normal,  no murmur, click, rub, or gallop             Dental  No notable dental hx       Pulmonary  Breath sounds clear to auscultation               Abdominal  GI exam deferred       Other Findings            Anesthetic Plan    ASA: 3  Anesthesia type: MAC          Induction: Intravenous  Anesthetic plan and risks discussed with: Patient

## 2021-02-17 NOTE — DISCHARGE INSTRUCTIONS
Arian Camara [facility] [address] Palafox M.D.  Matthew Reasons, [address] [facility]  [phone]          COLONOSCOPY [address]   [id number]  1950    DISCOMFORT:  Redness at IV site- apply warm compress to area; if redness or soreness persist- contact your physician  There may be a slight amount of blood passed from the rectum  Gaseous discomfort- walking, belching will help relieve any discomfort  You may not operate a vehicle for 12 hours  You may not engage in an occupation involving machinery or appliances for the  rest of today  You may not drink alcoholic beverages for at least 12 hours  Avoid making any critical decisions for at least 24 hours    DIET:   You may resume your normal diet, but some patients find that heavy or large  meals may lead to indigestion or vomiting. I suggest a light meal as first food  intake. I recommend a whole food, plant-based diet for your overall health. ACTIVITY:  You may resume your normal daily activities. It is recommended that you spend the remainder of the day resting - avoid any strenuous activity. CALL GRETCHEN Merrill ANY SIGN OF:   Increasing pain, nausea, vomiting  Abdominal distension (swelling)  Significant bleeding (oral or rectal)  Fever   Pain in chest area  Shortness of breath    Additional Instructions:   Call Dr. Leanne Palafox if any questions or problems at [phone]   Should have a repeat colonoscopy in 5 years. Colonoscopy showed L sided diverticulosis and small internal hemorrhoids. Learning About Coronavirus [phone])  Coronavirus [phone]): Overview  What is coronavirus (COVID-19)? The coronavirus disease (COVID-19) is caused by a virus. It is an illness that was first found in [place], [place], in December 2019. It has since spread worldwide. The virus can cause fever, cough, and trouble breathing. In severe cases, it can cause pneumonia and make it hard to breathe without help.  It can cause death.  Coronaviruses are a large group of viruses. They cause the common cold. They also cause more serious illnesses like Middle East respiratory syndrome (MERS) and severe acute respiratory syndrome (SARS). COVID-19 is caused by a novel coronavirus. That means it's a new type that has not been seen in people before. This virus spreads person-to-person through droplets from coughing and sneezing. It can also spread when you are close to someone who is infected. And it can spread when you touch something that has the virus on it, such as a doorknob or a tabletop. What can you do to protect yourself from coronavirus (COVID-19)? The best way to protect yourself from getting sick is to:  · Avoid areas where there is an outbreak. · Avoid contact with people who may be infected. · Wash your hands often with soap or alcohol-based hand sanitizers. · Avoid crowds and try to stay at least 6 feet away from other people. · Wash your hands often, especially after you cough or sneeze. Use soap and water, and scrub for at least 20 seconds. If soap and water aren't available, use an alcohol-based hand . · Avoid touching your mouth, nose, and eyes. What can you do to avoid spreading the virus to others? To help avoid spreading the virus to others:  · Cover your mouth with a tissue when you cough or sneeze. Then throw the tissue in the trash. · Use a disinfectant to clean things that you touch often. · Stay home if you are sick or have been exposed to the virus. Don't go to school, work, or public areas. And don't use public transportation. · If you are sick:  ? Leave your home only if you need to get medical care. But call the doctor's office first so they know you're coming. And wear a face mask, if you have one.  ? If you have a face mask, wear it whenever you're around other people. It can help stop the spread of the virus when you cough or sneeze. ? Clean and disinfect your home every day.  Use household  and disinfectant wipes or sprays. Take special care to clean things that you grab with your hands. These include doorknobs, remote controls, phones, and handles on your refrigerator and microwave. And don't forget countertops, tabletops, bathrooms, and computer keyboards. When to call for help  Call 911 anytime you think you may need emergency care. For example, call if:  · You have severe trouble breathing. (You can't talk at all.)  · You have constant chest pain or pressure. · You are severely dizzy or lightheaded. · You are confused or can't think clearly. · Your face and lips have a blue color. · You pass out (lose consciousness) or are very hard to wake up. Call your doctor now if you develop symptoms such as:  · Shortness of breath. · Fever. · Cough. If you need to get care, call ahead to the doctor's office for instructions before you go. Make sure you wear a face mask, if you have one, to prevent exposing other people to the virus. Where can you get the latest information? The following health organizations are tracking and studying this virus. Their websites contain the most up-to-date information. Madeline Rodriguez also learn what to do if you think you may have been exposed to the virus. · U.S. Centers for Disease Control and Prevention (CDC): The CDC provides updated news about the disease and travel advice. The website also tells you how to prevent the spread of infection. www.cdc.gov  · World Health Organization [facility]): WHO offers information about the virus outbreaks. WHO also has travel advice. www.who.int  Current as of: April 1, 2020               Content Version: 12.4  © 7497-4104 Healthwise, Incorporated.    Care instructions adapted under license by your healthcare professional. If you have questions about a medical condition or this instruction, always ask your healthcare professional. [address] any warranty or liability for your use of this information.

## 2021-02-17 NOTE — PROCEDURES
Arian Camara [facility] [address] Palma M.D.  Geno Zapien, [address] [facility]  [phone]               Colonoscopy Procedure Note    NAME: Denys Cole  :  1950  MRN:  [id number]    Indications:   Personal history of colon polyps (screening only)     : Darrell Favre, MD    Referring Provider:  Brad Mccauley MD    Staff: Endoscopy Technician-1: Deborah Luke  Endoscopy RN-1: Jesenia Rdz RN    Prosthetic devices, grafts, tissues, transplant, or devices implanted: none    Medicines:  MAC anesthesia      Procedure Details:  After informed consent was obtained with all risks and benefits of the procedure explained and preprocedure exam completed, the patient was placed in the left lateral decubitus position. Universal protocol for patient identification was performed and documented in the nursing notes. Throughout the procedure, the patient's blood pressure was monitored at least every five minutes; pulse, and oxygen saturations were monitored continuously. All vital signs were documented in the nursing notes. A digital rectal exam was performed and was normal.  The Olympus videocolonoscope  was inserted in the rectum and carefully advanced to the cecum, which was identified by the ileocecal valve and appendiceal orifice. The colonoscope was slowly withdrawn with careful evaluation between folds. Retroflexion in the rectum was performed; findings and interventions are described below. Procedure start time, extent reached time/cecum time, and procedure end time are documented in the nursing notes. The quality of preparation was adequate. Findings:   1. L sided moderate diverticulosis  2. Small internal hemorrhoids    Interventions:    none    Specimens:   * No specimens in log *    EBL:  None. Complications:   No immediate complications     Impression:  -See post-procedure diagnoses. Recommendations:   -Repeat colonoscopy in 5 years.    If < 10 years, reason:  above average risk patient    Resume normal medication(s). Signed by:  Javan Keith MD          2/17/2021  12:29 PM

## 2021-02-17 NOTE — H&P
[address], [address] [place] [address]          Pre-procedure History and Physical       NAME:  Joey Duque   :   1950   MRN:   [id number]     CHIEF COMPLAINT/HPI: colon polyps    PMH:  Past Medical History:   Diagnosis Date    Arthritis     Cancer ([facility]     skin    Chronic pain     back - PT    Diabetes ([facility] [address] [address]     PRE DIABETIC    Hypercholesterolemia     Hypertension     Ill-defined condition     N/V    Ill-defined condition     right eye scheduled for cataract removed 2017    Thyroid disease        PSH:  Past Surgical History:   Procedure Laterality Date    HX CATARACT REMOVAL Bilateral     HX  SECTION      HX COLONOSCOPY  16    Dr. Elena Cedillo HX HEENT Bilateral 2020    EYE LIFT    HX HEENT  2020    eyelid surgery     HX ENT      oral sx     HX HYSTERECTOMY      HX LAP CHOLECYSTECTOMY  2017    Momin    HX MALIGNANT SKIN LESION EXCISION      skin cancer from nose - BCCA per pt    HX ORTHOPAEDIC Left 2019    Ankle srugery    HX ORTHOPAEDIC Right 2020    Dr. Echo Schneider  [phone],     breast biopsy - all benign       Allergies: Allergies   Allergen Reactions    Codeine Other (comments)     Hallucination/room spinning    Sudafed [Pseudoephedrine Hcl] Palpitations    Darvocet A500 [Propoxyphene N-Acetaminophen] Nausea and Vomiting       Home Medications:  Prior to Admission Medications   Prescriptions Last Dose Informant Patient Reported? Taking? Cholecalciferol, Vitamin D3, (VITAMIN D3) 2,000 unit cap capsule 2021 at Unknown time  No Yes   Sig: Take 2,000 Units by mouth daily. GLUCOSAMINE/CHONDROITIN SULF A (GLUCOSAMINE-CHONDROITIN PO) 2021 at Unknown time  Yes Yes   Sig: Take  by mouth. 1500mg/1200mg per 2 pills. Takes one pill once daily.    chlorthalidone (HYGROTEN) 50 mg tablet 2/16/2021 at Unknown time  No Yes   Sig: Take 1 tablet by mouth once daily   cloNIDine HCL (CATAPRES) 0.1 mg tablet 2/17/2021 at Unknown time  No Yes   Sig: Take 1 tablet by mouth twice daily   conjugated estrogens (Premarin) 0.625 mg tablet 2/16/2021 at Unknown time  No Yes   Sig: Take 1 tablet by mouth once daily   fenofibrate nanocrystallized (TRICOR) 145 mg tablet 2/16/2021 at Unknown time  No Yes   Sig: Take 1 tablet by mouth once daily   gabapentin (NEURONTIN) 100 mg capsule 2/16/2021 at Unknown time  No Yes   Sig: TAKE 1 CAPSULE BY MOUTH THREE TIMES DAILY. MAX DAILY AMOUNT: 300 MG   ketoconazole (NIZORAL) 2 % topical cream 2/16/2021 at Unknown time  Yes Yes   levothyroxine (SYNTHROID) 112 mcg tablet 2/16/2021 at Unknown time  No Yes   Sig: TAKE 1 TABLET BY MOUTH BEFORE BREAKFAST . APPOINTMENT REQUIRED FOR FUTURE REFILLS   loratadine (ALLERCLEAR) 10 mg tablet 2/16/2021 at Unknown time  Yes Yes   Sig: Take 10 mg by mouth daily as needed for Allergies. metFORMIN ER (GLUCOPHAGE XR) 500 mg tablet 2/15/2021  No No   Sig: TAKE 1 TABLET BY MOUTH ONCE DAILY WITH SUPPER   rOPINIRole (REQUIP) 1 mg tablet 2/16/2021 at Unknown time  No Yes   Sig: Take 1 tablet by mouth twice daily   triamcinolone acetonide (KENALOG) 0.1 % topical cream 2/16/2021 at Unknown time  No Yes   Sig: Apply  to affected area two (2) times a day. use thin layer   vitamin E (AQUA GEMS) 400 unit capsule 2/16/2021 at Unknown time  Yes Yes   Sig: Take 400 Units by mouth daily.       Facility-Administered Medications: None       Hospital Medications:  Current Facility-Administered Medications   Medication Dose Route Frequency    0.9% sodium chloride infusion  150 mL/hr IntraVENous CONTINUOUS    sodium chloride (NS) flush 5-40 mL  5-40 mL IntraVENous Q8H    sodium chloride (NS) flush 5-40 mL  5-40 mL IntraVENous PRN    midazolam (VERSED) injection 0.25-5 mg  0.25-5 mg IntraVENous Multiple    fentaNYL citrate (PF) injection 200 mcg  200 mcg IntraVENous Multiple    simethicone (MYLICON) 14LO/2.1ML oral drops 80 mg  1.2 mL Oral Multiple    atropine injection 0.5 mg  0.5 mg IntraVENous ONCE PRN    EPINEPHrine (ADRENALIN) 0.1 mg/mL syringe 1 mg  1 mg Endoscopically ONCE PRN     Facility-Administered Medications Ordered in Other Encounters   Medication Dose Route Frequency    0.9% sodium chloride infusion   IntraVENous CONTINUOUS       Family History:  Family History   Problem Relation Age of Onset    Heart Disease Father         CAD, CABG    Diabetes Father         insulin    Hypertension Mother     Cancer Mother         nose - skin - BCCA    Hypertension Brother     Other Brother         HEART MURMUR    Hypertension Brother     Other Brother         PVD    Kidney Disease Brother         dialysis    Diabetes Brother         insulin    Anesth Problems Neg Hx        Social History:  Social History     Tobacco Use    Smoking status: Never Smoker    Smokeless tobacco: Never Used   Substance Use Topics    Alcohol use: Not Currently     Comment: maybe once a year - has a sip       The patient was counseled at length about the risks of grace Covid-19 in the melany-operative and post-operative states including the recovery window of their procedure. The patient was made aware that grace Covid-19 after a surgical procedure may worsen their prognosis for recovering from the virus and lend to a higher morbidity and or mortality risk. The patient was given the options of postponing their procedure. All of the risks, benefits, and alternatives were discussed. The patient does  wish to proceed with the procedure. PHYSICAL EXAM PRIOR TO SEDATION:  General: Alert, in no acute distress    Lungs:            CTA bilaterally  Heart:  Normal S1, S2    Abdomen: Soft, Non distended, Non tender. Normoactive bowel sounds. Assessment:   Stable for sedation administration.   Date of last colonoscopy: 5 yrs, Polyps  Yes    Plan:     · Endoscopic procedure with sedation     Signed By: Jonatan Mcgraw MD     2/17/2021  12:05 PM

## 2021-02-17 NOTE — PERIOP NOTES

## 2021-02-17 NOTE — ROUTINE PROCESS
Hayden De Leon 1950 
[id number] Situation: 
Verbal report received from: Gracia Hernandez Procedure: Procedure(s): 
COLONOSCOPY   :- 
 
Background: 
 
Preoperative diagnosis: HSTORY OF POLYPS Postoperative diagnosis: 1)Diverticulosis 2)internal hemorrhoids :  Dr. Gina Zapien Assistant(s): Endoscopy Technician-1: Christopher Ramirez 
Endoscopy RN-1: Summer Simpson RN Specimens: * No specimens in log * H. Pylori  no Assessment: 
Intra-procedure medications Anesthesia gave intra-procedure sedation and medications, see anesthesia flow sheet yes Intravenous fluids: NS@ [facility] Vital signs stable Abdominal assessment: round and soft Recommendation: 
Discharge patient per MD order. Family or Friend Spouse Permission to share finding with family or friend yes

## 2021-02-24 ENCOUNTER — IMMUNIZATION (OUTPATIENT)
Dept: INTERNAL MEDICINE CLINIC | Age: 71
End: 2021-02-24
Payer: MEDICARE

## 2021-02-24 DIAGNOSIS — Z23 ENCOUNTER FOR IMMUNIZATION: Primary | ICD-10-CM

## 2021-02-24 PROCEDURE — 91301 COVID-19, MRNA, LNP-S, PF, 100MCG/0.5ML DOSE(MODERNA): CPT | Performed by: FAMILY MEDICINE

## 2021-02-24 PROCEDURE — 0012A COVID-19, MRNA, LNP-S, PF, 100MCG/0.5ML DOSE(MODERNA): CPT | Performed by: FAMILY MEDICINE

## 2021-03-04 DIAGNOSIS — E03.9 ACQUIRED HYPOTHYROIDISM: Primary | ICD-10-CM

## 2021-03-04 RX ORDER — CHLORTHALIDONE 50 MG/1
TABLET ORAL
Qty: 90 TAB | Refills: 0 | Status: SHIPPED | OUTPATIENT
Start: 2021-03-04 | End: 2021-03-29

## 2021-03-04 RX ORDER — LEVOTHYROXINE SODIUM 112 UG/1
TABLET ORAL
Qty: 90 TAB | Refills: 0 | Status: SHIPPED | OUTPATIENT
Start: 2021-03-04 | End: 2021-03-29

## 2021-03-04 RX ORDER — METFORMIN HYDROCHLORIDE 500 MG/1
TABLET, EXTENDED RELEASE ORAL
Qty: 90 TAB | Refills: 0 | Status: SHIPPED | OUTPATIENT
Start: 2021-03-04 | End: 2021-05-27

## 2021-03-05 NOTE — TELEPHONE ENCOUNTER
Requested Prescriptions     Pending Prescriptions Disp Refills    conjugated estrogens (Premarin) 0.625 mg tablet 90 Tab 0     Sig: Take 1 tablet by mouth once daily                       [address] [facility], [address] [facility]  [phone]
no discharge, no irritation, no pain, no redness, and no visual changes.

## 2021-03-29 DIAGNOSIS — E03.9 ACQUIRED HYPOTHYROIDISM: ICD-10-CM

## 2021-03-29 DIAGNOSIS — M54.14 THORACIC RADICULOPATHY: ICD-10-CM

## 2021-03-29 RX ORDER — CHLORTHALIDONE 50 MG/1
TABLET ORAL
Qty: 90 TAB | Refills: 0 | Status: SHIPPED | OUTPATIENT
Start: 2021-03-29 | End: 2021-09-28

## 2021-03-29 RX ORDER — ROPINIROLE 1 MG/1
TABLET, FILM COATED ORAL
Qty: 60 TAB | Refills: 0 | Status: SHIPPED | OUTPATIENT
Start: 2021-03-29 | End: 2021-04-26

## 2021-03-29 RX ORDER — LEVOTHYROXINE SODIUM 112 UG/1
TABLET ORAL
Qty: 90 TAB | Refills: 0 | Status: SHIPPED | OUTPATIENT
Start: 2021-03-29 | End: 2021-09-20

## 2021-03-29 RX ORDER — GABAPENTIN 100 MG/1
CAPSULE ORAL
Qty: 90 CAP | Refills: 0 | Status: SHIPPED | OUTPATIENT
Start: 2021-03-29 | End: 2021-07-28

## 2021-04-26 RX ORDER — ROPINIROLE 1 MG/1
TABLET, FILM COATED ORAL
Qty: 60 TAB | Refills: 0 | Status: SHIPPED | OUTPATIENT
Start: 2021-04-26 | End: 2021-05-27

## 2021-05-18 RX ORDER — FENOFIBRATE 145 MG/1
TABLET, COATED ORAL
Qty: 90 TAB | Refills: 0 | Status: SHIPPED | OUTPATIENT
Start: 2021-05-18 | End: 2021-08-20

## 2021-05-18 RX ORDER — CLONIDINE HYDROCHLORIDE 0.1 MG/1
TABLET ORAL
Qty: 180 TAB | Refills: 0 | Status: SHIPPED | OUTPATIENT
Start: 2021-05-18 | End: 2021-06-08 | Stop reason: DRUGHIGH

## 2021-05-27 RX ORDER — ROPINIROLE 1 MG/1
TABLET, FILM COATED ORAL
Qty: 60 TABLET | Refills: 0 | Status: SHIPPED | OUTPATIENT
Start: 2021-05-27 | End: 2021-06-21

## 2021-05-27 RX ORDER — METFORMIN HYDROCHLORIDE 500 MG/1
TABLET, EXTENDED RELEASE ORAL
Qty: 90 TABLET | Refills: 0 | Status: SHIPPED | OUTPATIENT
Start: 2021-05-27 | End: 2021-08-20

## 2021-06-04 ENCOUNTER — HOSPITAL ENCOUNTER (EMERGENCY)
Age: 71
Discharge: HOME | End: 2021-06-04
Attending: EMERGENCY MEDICINE
Payer: MEDICARE

## 2021-06-04 VITALS
RESPIRATION RATE: 18 BRPM | OXYGEN SATURATION: 100 % | SYSTOLIC BLOOD PRESSURE: 163 MMHG | HEIGHT: 61 IN | WEIGHT: 182.98 LBS | BODY MASS INDEX: 34.55 KG/M2 | TEMPERATURE: 98.9 F | DIASTOLIC BLOOD PRESSURE: 73 MMHG | HEART RATE: 89 BPM

## 2021-06-04 DIAGNOSIS — I10 HYPERTENSION, UNSPECIFIED TYPE: ICD-10-CM

## 2021-06-04 DIAGNOSIS — L03.113 CELLULITIS OF RIGHT UPPER EXTREMITY: Primary | ICD-10-CM

## 2021-06-04 PROCEDURE — 99283 EMERGENCY DEPT VISIT LOW MDM: CPT

## 2021-06-04 PROCEDURE — 74011250637 HC RX REV CODE- 250/637: Performed by: EMERGENCY MEDICINE

## 2021-06-04 RX ORDER — DOXYCYCLINE HYCLATE 100 MG
100 TABLET ORAL
Status: COMPLETED | OUTPATIENT
Start: 2021-06-04 | End: 2021-06-04

## 2021-06-04 RX ORDER — CLONIDINE HYDROCHLORIDE 0.1 MG/1
0.1 TABLET ORAL
Status: COMPLETED | OUTPATIENT
Start: 2021-06-04 | End: 2021-06-04

## 2021-06-04 RX ORDER — DOXYCYCLINE HYCLATE 100 MG
100 TABLET ORAL 2 TIMES DAILY
Qty: 20 TABLET | Refills: 0 | Status: SHIPPED | OUTPATIENT
Start: 2021-06-04 | End: 2021-06-14

## 2021-06-04 RX ADMIN — DOXYCYCLINE HYCLATE 100 MG: 100 TABLET, COATED ORAL at 19:53

## 2021-06-04 RX ADMIN — CLONIDINE HYDROCHLORIDE 0.1 MG: 0.1 TABLET ORAL at 19:53

## 2021-06-04 NOTE — ED TRIAGE NOTES
Pt states that she started having chills last night and today noticed redness, pain, and swelling to her right posterior arm.

## 2021-06-05 NOTE — ED PROVIDER NOTES
The history is provided by the patient. Arm Pain   This is a new problem. The current episode started yesterday. The problem occurs constantly. The problem has been gradually worsening. The pain is present in the right elbow. The pain is mild. Pertinent negatives include no numbness and full range of motion. The symptoms are aggravated by palpation. Treatments tried: NSAIDS. The treatment provided mild relief. There has been no history of extremity trauma. Skin Problem   This is a new problem. The current episode started yesterday. The problem has been gradually worsening. Patient reports a subjective fever - was not measured. The fever has been present for less than 1 day. The rash is present on the right arm. The pain is mild. The pain has been constant since onset. Pertinent negatives include no blisters, no weeping and no hives.         Past Medical History:   Diagnosis Date    Arthritis     Cancer ([facility] [address]     skin    Chronic pain     back - PT    Diabetes ([facility] [address]     PRE DIABETIC    Hypercholesterolemia     Hypertension     Ill-defined condition     N/V    Ill-defined condition     right eye scheduled for cataract removed 2017    Thyroid disease        Past Surgical History:   Procedure Laterality Date    COLONOSCOPY N/A 2021    COLONOSCOPY   :- performed by Tru Vogel MD at [address] HX CATARACT REMOVAL Bilateral 2018    HX  SECTION      HX COLONOSCOPY  16    Dr. John Mcadniel HX HEENT Bilateral 2020    EYE LIFT    HX HEENT  2020    eyelid surgery     HX HEENT      oral sx     HX HYSTERECTOMY      HX LAP CHOLECYSTECTOMY  2017    Momin    HX MALIGNANT SKIN LESION EXCISION      skin cancer from nose - BCCA per pt    HX ORTHOPAEDIC Left 2019    Ankle srugery    HX ORTHOPAEDIC Right 2020    Dr. Gina Griffin  [phone],     breast biopsy - all benign         Family History:   Problem Relation Age of Onset    Heart Disease Father         CAD, CABG    Diabetes Father         insulin    Hypertension Mother     Cancer Mother         nose - skin - BCCA    Hypertension Brother     Other Brother         HEART MURMUR    Hypertension Brother     Other Brother         PVD    Kidney Disease Brother         dialysis    Diabetes Brother         insulin    Anesth Problems Neg Hx        Social History     Socioeconomic History    Marital status:      Spouse name: Not on file    Number of children: Not on file    Years of education: Not on file    Highest education level: Not on file   Occupational History    Not on file   Tobacco Use    Smoking status: Never Smoker    Smokeless tobacco: Never Used   Substance and Sexual Activity    Alcohol use: Not Currently     Comment: maybe once a year - has a sip    Drug use: No    Sexual activity: Yes     Partners: Male   Other Topics Concern    Not on file   Social History Narrative    Not on file     Social Determinants of Health     Financial Resource Strain:     Difficulty of Paying Living Expenses:    Food Insecurity:     Worried About Running Out of Food in the Last Year:     [address] in the Last Year:    Transportation Needs:     Lack of Transportation (Medical):  Lack of Transportation (Non-Medical):    Physical Activity:     Days of Exercise per Week:     Minutes of Exercise per Session:    Stress:     Feeling of Stress :    Social Connections:     Frequency of Communication with Friends and Family:     Frequency of Social Gatherings with Friends and Family:     Attends [employer] Services:     Active Member of Clubs or Organizations:     Attends Club or Organization Meetings:     Marital Status:    Intimate Partner Violence:     Fear of Current or Ex-Partner:     Emotionally Abused:     Physically Abused:     Sexually Abused:           ALLERGIES: Codeine, Sudafed [pseudoephedrine hcl], and Darvocet a500 [propoxyphene n-acetaminophen]    Review of Systems   Constitutional: Positive for chills and fatigue. Negative for fever. Respiratory: Negative for shortness of breath. Cardiovascular: Negative for chest pain. Gastrointestinal: Negative for diarrhea, nausea and vomiting. Skin: Positive for rash. Neurological: Negative for dizziness, light-headedness and numbness. All other systems reviewed and are negative. Vitals:    06/04/21 1935 06/04/21 1940   BP: (!) 189/78 (!) 189/78   Pulse: 89    Resp: 18    Temp: 98.9 °F (37.2 °C)    SpO2: 100% 100%   Weight: 83 kg (182 lb 15.7 oz)    Height: 5' 1\" (1.549 m)             Physical Exam  Vitals and nursing note reviewed. Constitutional:       Appearance: She is well-developed. HENT:      Head: Normocephalic and atraumatic. Eyes:      General: No scleral icterus. Cardiovascular:      Rate and Rhythm: Normal rate and regular rhythm. Pulmonary:      Effort: Pulmonary effort is normal.   Abdominal:      General: There is no distension. Musculoskeletal:      Right elbow: No deformity or effusion. Normal range of motion. Cervical back: Normal range of motion. Skin:     General: Skin is warm and dry. Findings: Erythema (erythema, warmth, and mild induration over right elbow) present. No rash. Neurological:      General: No focal deficit present. Mental Status: She is alert and oriented to person, place, and time. Psychiatric:         Mood and Affect: Mood normal.         Behavior: Behavior normal.          MDM       Procedures    Education was provided to the patient today regarding their hypertension. Patient has not signs/symptoms of ACS, CHF, CVA, or other hypertensive emergency. She reported that she missed a dose of her clonidine and her elevated pressures are likely a result of this; she was given a dose of her antihypertensive while in the ED.   Patient is made aware of their elevated blood pressure and is instructed to follow up this week with their Primary Care for a recheck. Patient states their understanding and agrees to follow up this week. The patient is now resting comfortably and feels better, is alert, is not toxic, and is in no distress. History and exam is consistent with a cellulitis and she will be started on doxycycline. The patient has a normal mental status and is neurologically intact. The rash does not have petechiae or purpura. There are no mucous membrane lesions, no signs of abscess, and no bullae. The patient appears well, has no fever, altered mental status or signs of systemic toxicity. The history, exam, diagnostic testing (if any) and current condition do not demonstrate signs of sepsis, [facility] Spotted Fever, meningitis, meningococcemia, Lyme disease, toxic shock syndrome, septic arthritis, gout, or other significant systemic illness requiring further treatment, testing or consultation in the emergency department. The vital signs have been stable. The patient's condition is stable and appropriate for discharge. The patient will pursue further outpatient evaluation with the primary care physician or other designated or consulting physician as indicated in the discharge instructions.

## 2021-06-07 ENCOUNTER — TELEPHONE (OUTPATIENT)
Dept: INTERNAL MEDICINE CLINIC | Age: 71
End: 2021-06-07

## 2021-06-07 NOTE — TELEPHONE ENCOUNTER
[phone]      FYI    Pt is feeling better from her ER visit on fri.6/4/21. Patient states we MAY respond via [employer].

## 2021-06-08 ENCOUNTER — OFFICE VISIT (OUTPATIENT)
Dept: INTERNAL MEDICINE CLINIC | Age: 71
End: 2021-06-08
Payer: MEDICARE

## 2021-06-08 VITALS
RESPIRATION RATE: 18 BRPM | DIASTOLIC BLOOD PRESSURE: 84 MMHG | OXYGEN SATURATION: 100 % | SYSTOLIC BLOOD PRESSURE: 172 MMHG | WEIGHT: 180 LBS | BODY MASS INDEX: 33.99 KG/M2 | HEIGHT: 61 IN | TEMPERATURE: 97.8 F | HEART RATE: 77 BPM

## 2021-06-08 DIAGNOSIS — I10 ESSENTIAL HYPERTENSION: Primary | ICD-10-CM

## 2021-06-08 DIAGNOSIS — M70.21 OLECRANON BURSITIS OF RIGHT ELBOW: ICD-10-CM

## 2021-06-08 DIAGNOSIS — E66.01 SEVERE OBESITY (HCC): ICD-10-CM

## 2021-06-08 DIAGNOSIS — L30.9 HAND ECZEMA: ICD-10-CM

## 2021-06-08 PROCEDURE — 3017F COLORECTAL CA SCREEN DOC REV: CPT | Performed by: INTERNAL MEDICINE

## 2021-06-08 PROCEDURE — G8536 NO DOC ELDER MAL SCRN: HCPCS | Performed by: INTERNAL MEDICINE

## 2021-06-08 PROCEDURE — G8399 PT W/DXA RESULTS DOCUMENT: HCPCS | Performed by: INTERNAL MEDICINE

## 2021-06-08 PROCEDURE — G8510 SCR DEP NEG, NO PLAN REQD: HCPCS | Performed by: INTERNAL MEDICINE

## 2021-06-08 PROCEDURE — G8427 DOCREV CUR MEDS BY ELIG CLIN: HCPCS | Performed by: INTERNAL MEDICINE

## 2021-06-08 PROCEDURE — G8753 SYS BP > OR = 140: HCPCS | Performed by: INTERNAL MEDICINE

## 2021-06-08 PROCEDURE — G0463 HOSPITAL OUTPT CLINIC VISIT: HCPCS | Performed by: INTERNAL MEDICINE

## 2021-06-08 PROCEDURE — 1090F PRES/ABSN URINE INCON ASSESS: CPT | Performed by: INTERNAL MEDICINE

## 2021-06-08 PROCEDURE — G8754 DIAS BP LESS 90: HCPCS | Performed by: INTERNAL MEDICINE

## 2021-06-08 PROCEDURE — 99214 OFFICE O/P EST MOD 30 MIN: CPT | Performed by: INTERNAL MEDICINE

## 2021-06-08 PROCEDURE — 1101F PT FALLS ASSESS-DOCD LE1/YR: CPT | Performed by: INTERNAL MEDICINE

## 2021-06-08 PROCEDURE — G9899 SCRN MAM PERF RSLTS DOC: HCPCS | Performed by: INTERNAL MEDICINE

## 2021-06-08 PROCEDURE — G8417 CALC BMI ABV UP PARAM F/U: HCPCS | Performed by: INTERNAL MEDICINE

## 2021-06-08 RX ORDER — CLONIDINE HYDROCHLORIDE 0.2 MG/1
0.2 TABLET ORAL 2 TIMES DAILY
Qty: 180 TABLET | Refills: 1 | Status: SHIPPED | OUTPATIENT
Start: 2021-06-08 | End: 2021-12-17

## 2021-06-08 RX ORDER — TRIAMCINOLONE ACETONIDE 1 MG/G
CREAM TOPICAL 2 TIMES DAILY
Qty: 45 G | Refills: 1 | Status: SHIPPED | OUTPATIENT
Start: 2021-06-08

## 2021-06-08 NOTE — PROGRESS NOTES
HPI:  Adrienne Sutton is a 80y.o. year old female who is here for an emergency room follow-up visit. She was seen there over the weekend with right elbow swelling and redness that had been present over just that day. She had had some chills for 2 nights prior. She was diagnosed with a? Cellulitis versus bursitis and treated with doxycycline. The swelling and redness and tenderness are much improved. No further fevers or chills. No trauma that she is aware of. Her blood pressure was markedly elevated in the emergency room. Her weight is up as she has not been exercising since she had recent ankle surgery. She is about to start physical therapy for that. Past Medical History:   Diagnosis Date    Arthritis     Cancer ([facility] [address]     skin    Chronic pain     back - PT    Diabetes ([facility] [address]     PRE DIABETIC    Hypercholesterolemia     Hypertension     Ill-defined condition     N/V    Ill-defined condition     right eye scheduled for cataract removed 2017    Thyroid disease        Past Surgical History:   Procedure Laterality Date    COLONOSCOPY N/A 2021    COLONOSCOPY   :- performed by Bernadine Rust MD at [address] HX CATARACT REMOVAL Bilateral 2018    HX  SECTION      HX COLONOSCOPY  16    Dr. Cricket Espinosa HX HEENT Bilateral 2020    EYE LIFT    HX HEENT  2020    eyelid surgery     HX HEENT      oral sx     HX HYSTERECTOMY      HX LAP CHOLECYSTECTOMY  2017    Momin    HX MALIGNANT SKIN LESION EXCISION  2012    skin cancer from nose - BCCA per pt    HX ORTHOPAEDIC Left 2019    Ankle srugery    HX ORTHOPAEDIC Right 2020    Dr. Raad Reyes Right 2021    Right ankle achilles bone spur repair.  HX OTHER SURGICAL  2020    ORAL SURGERY    [place] BREAST SURGERY PROCEDURE UNLISTED  ,3/2007,     breast biopsy - all benign       Prior to Admission medications    Medication Sig Start Date End Date Taking? Authorizing Provider   triamcinolone acetonide (KENALOG) 0.1 % topical cream Apply  to affected area two (2) times a day. use thin layer 6/8/21  Yes Weston Melgoza MD   cloNIDine HCL (CATAPRES) 0.2 mg tablet Take 1 Tablet by mouth two (2) times a day. 6/8/21  Yes Weston Melgoza MD   doxycycline (VIBRA-TABS) 100 mg tablet Take 1 Tablet by mouth two (2) times a day for 10 days. 6/4/21 6/14/21 Yes Juan David VENEGAS MD   metFORMIN ER (GLUCOPHAGE XR) 500 mg tablet TAKE 1 TABLET BY MOUTH ONCE DAILY WITH SUPPER 5/27/21  Yes Weston Melgoza MD   rOPINIRole (REQUIP) 1 mg tablet Take 1 tablet by mouth twice daily 5/27/21  Yes Mary Robledo, NP   fenofibrate nanocrystallized (TRICOR) 145 mg tablet Take 1 tablet by mouth once daily 5/18/21  Yes Weston Melgoza MD   levothyroxine (SYNTHROID) 112 mcg tablet TAKE 1 TABLET BY MOUTH BEFORE BREAKFAST. APPOINTMENT REQUIRED FOR FUTURE REFILLS 3/29/21  Yes Weston Melgoza MD   chlorthalidone (HYGROTEN) 50 mg tablet Take 1 tablet by mouth once daily 3/29/21  Yes Weston Melgoza MD   gabapentin (NEURONTIN) 100 mg capsule TAKE 1 CAPSULE BY MOUTH THREE TIMES DAILY. **MAX DAILY AMOUNT: 300 MG** 3/29/21  Yes Weston Melgoza MD   conjugated estrogens (Premarin) 0.625 mg tablet Take 1 tablet by mouth once daily 3/5/21  Yes Weston Melgoza MD   Cholecalciferol, Vitamin D3, (VITAMIN D3) 2,000 unit cap capsule Take 2,000 Units by mouth daily. 4/16/18  Yes Weston Melgoza MD   vitamin E (AQUA GEMS) 400 unit capsule Take 400 Units by mouth daily. Yes Provider, Historical   GLUCOSAMINE/CHONDROITIN SULF A (GLUCOSAMINE-CHONDROITIN PO) Take  by mouth. 1500mg/1200mg per 2 pills. Takes one pill once daily. Yes Provider, Historical   loratadine (ALLERCLEAR) 10 mg tablet Take 10 mg by mouth daily as needed for Allergies.    Yes Provider, Historical   cloNIDine HCL (CATAPRES) 0.1 mg tablet Take 1 tablet by mouth twice daily 5/18/21 6/8/21  Shanti Montiel III, MD   triamcinolone acetonide (KENALOG) 0.1 % topical cream Apply  to affected area two (2) times a day. use thin layer 9/15/20 6/8/21  Gale Ayers MD   ketoconazole (NIZORAL) 2 % topical cream  4/16/18 6/8/21  Provider, Historical       Social History     Socioeconomic History    Marital status:      Spouse name: Not on file    Number of children: Not on file    Years of education: Not on file    Highest education level: Not on file   Occupational History    Not on file   Tobacco Use    Smoking status: Never Smoker    Smokeless tobacco: Never Used   Substance and Sexual Activity    Alcohol use: Not Currently     Comment: maybe once a year - has a sip    Drug use: No    Sexual activity: Yes     Partners: Male   Other Topics Concern    Not on file   Social History Narrative    Not on file     Social Determinants of Health     Financial Resource Strain:     Difficulty of Paying Living Expenses:    Food Insecurity:     Worried About Running Out of Food in the Last Year:     [address] in the Last Year:    Transportation Needs:     Lack of Transportation (Medical):      Lack of Transportation (Non-Medical):    Physical Activity:     Days of Exercise per Week:     Minutes of Exercise per Session:    Stress:     Feeling of Stress :    Social Connections:     Frequency of Communication with Friends and Family:     Frequency of Social Gatherings with Friends and Family:     Attends [employer] Services:     Active Member of Clubs or Organizations:     Attends Club or Organization Meetings:     Marital Status:    Intimate Partner Violence:     Fear of Current or Ex-Partner:     Emotionally Abused:     Physically Abused:     Sexually Abused:           ROS  Per HPI    Visit Vitals  BP (!) 172/84   Pulse 77   Temp 97.8 °F (36.6 °C) (Temporal)   Resp 18   Ht 5' 1\" (1.549 m)   Wt 180 lb (81.6 kg)   SpO2 100%   BMI 34.01 kg/m²         Physical Exam   Physical Examination: General appearance - alert, well appearing, and in no distress  Chest - clear to auscultation, no wheezes, rales or rhonchi, symmetric air entry  Heart - normal rate and regular rhythm  Neurological - alert, oriented, normal speech, no focal findings or movement disorder noted  Musculoskeletal - abnormal exam of right elbow with some slight prominence of the olecranon bursa. Some mild redness and swelling there. No fluctuance. No skin breaks. There are some small excoriations. Extremities - peripheral pulses normal, no pedal edema, no clubbing or cyanosis      Assessment/Plan:  Diagnoses and all orders for this visit:    1. Essential hypertension not well controlled. Will increase clonidine to twice a day and see if that is helpful. She is having no sedation from that. 2. Hand eczemaprovided a refill today. -     triamcinolone acetonide (KENALOG) 0.1 % topical cream; Apply  to affected area two (2) times a day. use thin layer    3. Severe obesity (HCC)work on diet and exercise to improve this. She may have to consider swimming to get around her ankle issues. 4. Olecranon bursitis of right elbow? Bacterial.  Will complete the doxycycline. We will also use warm compresses and elevation and she will let me know if not improving. Other orders  -     cloNIDine HCL (CATAPRES) 0.2 mg tablet; Take 1 Tablet by mouth two (2) times a day. Follow-up and Dispositions    · Return in about 1 month (around 7/8/2021). Advised her to call back or return to office if symptoms worsen/change/persist.  Discussed expected course/resolution/complications of diagnosis in detail with patient. Medication risks/benefits/costs/interactions/alternatives discussed with patient. She was given an after visit summary which includes diagnoses, current medications, & vitals. She expressed understanding with the diagnosis and plan.

## 2021-06-14 ENCOUNTER — OFFICE VISIT (OUTPATIENT)
Dept: INTERNAL MEDICINE CLINIC | Age: 71
End: 2021-06-14
Payer: MEDICARE

## 2021-06-14 VITALS
HEIGHT: 61 IN | WEIGHT: 177 LBS | SYSTOLIC BLOOD PRESSURE: 133 MMHG | DIASTOLIC BLOOD PRESSURE: 78 MMHG | TEMPERATURE: 97.8 F | RESPIRATION RATE: 16 BRPM | HEART RATE: 64 BPM | OXYGEN SATURATION: 99 % | BODY MASS INDEX: 33.42 KG/M2

## 2021-06-14 DIAGNOSIS — I10 ESSENTIAL HYPERTENSION: ICD-10-CM

## 2021-06-14 DIAGNOSIS — L03.113 CELLULITIS OF RIGHT UPPER EXTREMITY: ICD-10-CM

## 2021-06-14 DIAGNOSIS — K42.0 UMBILICAL HERNIA, INCARCERATED: Primary | ICD-10-CM

## 2021-06-14 PROCEDURE — G8417 CALC BMI ABV UP PARAM F/U: HCPCS | Performed by: INTERNAL MEDICINE

## 2021-06-14 PROCEDURE — G8427 DOCREV CUR MEDS BY ELIG CLIN: HCPCS | Performed by: INTERNAL MEDICINE

## 2021-06-14 PROCEDURE — 3017F COLORECTAL CA SCREEN DOC REV: CPT | Performed by: INTERNAL MEDICINE

## 2021-06-14 PROCEDURE — G9899 SCRN MAM PERF RSLTS DOC: HCPCS | Performed by: INTERNAL MEDICINE

## 2021-06-14 PROCEDURE — G8752 SYS BP LESS 140: HCPCS | Performed by: INTERNAL MEDICINE

## 2021-06-14 PROCEDURE — G0463 HOSPITAL OUTPT CLINIC VISIT: HCPCS | Performed by: INTERNAL MEDICINE

## 2021-06-14 PROCEDURE — G8510 SCR DEP NEG, NO PLAN REQD: HCPCS | Performed by: INTERNAL MEDICINE

## 2021-06-14 PROCEDURE — G8754 DIAS BP LESS 90: HCPCS | Performed by: INTERNAL MEDICINE

## 2021-06-14 PROCEDURE — 1101F PT FALLS ASSESS-DOCD LE1/YR: CPT | Performed by: INTERNAL MEDICINE

## 2021-06-14 PROCEDURE — G8399 PT W/DXA RESULTS DOCUMENT: HCPCS | Performed by: INTERNAL MEDICINE

## 2021-06-14 PROCEDURE — 1090F PRES/ABSN URINE INCON ASSESS: CPT | Performed by: INTERNAL MEDICINE

## 2021-06-14 PROCEDURE — G8536 NO DOC ELDER MAL SCRN: HCPCS | Performed by: INTERNAL MEDICINE

## 2021-06-14 PROCEDURE — 99214 OFFICE O/P EST MOD 30 MIN: CPT | Performed by: INTERNAL MEDICINE

## 2021-06-14 NOTE — PROGRESS NOTES
Verified name and birth date for privacy precautions. Chart reviewed in preparation for today's visit. Chief Complaint   Patient presents with    Mass          There are no preventive care reminders to display for this patient. Wt Readings from Last 3 Encounters:   06/14/21 177 lb (80.3 kg)   06/08/21 180 lb (81.6 kg)   06/04/21 182 lb 15.7 oz (83 kg)     Temp Readings from Last 3 Encounters:   06/14/21 97.8 °F (36.6 °C) (Temporal)   06/08/21 97.8 °F (36.6 °C) (Temporal)   06/04/21 98.9 °F (37.2 °C)     BP Readings from Last 3 Encounters:   06/14/21 133/78   06/08/21 (!) 172/84   06/04/21 (!) 163/73     Pulse Readings from Last 3 Encounters:   06/14/21 64   06/08/21 77   06/04/21 89         Learning Assessment:  :     Learning Assessment 9/19/2014   PRIMARY LEARNER Patient   HIGHEST LEVEL OF EDUCATION - PRIMARY LEARNER  GRADUATED HIGH SCHOOL OR GED   BARRIERS PRIMARY LEARNER NONE   CO-LEARNER CAREGIVER No   PRIMARY LANGUAGE ENGLISH   LEARNER PREFERENCE PRIMARY DEMONSTRATION   ANSWERED BY patient   RELATIONSHIP SELF       Depression Screening:  :     3 most recent PHQ Screens 6/14/2021   Little interest or pleasure in doing things Not at all   Feeling down, depressed, irritable, or hopeless Not at all   Total Score PHQ 2 0       Fall Risk Assessment:  :     Fall Risk Assessment, last 12 mths 6/14/2021   Able to walk? Yes   Fall in past 12 months? 0   Do you feel unsteady? 0   Are you worried about falling 0   Number of falls in past 12 months -   Fall with injury? -       Abuse Screening:  :     Abuse Screening Questionnaire 6/14/2021 11/13/2017   Do you ever feel afraid of your partner? N N   Are you in a relationship with someone who physically or mentally threatens you? N N   Is it safe for you to go home?  Katarina Sheikh

## 2021-06-15 NOTE — PROGRESS NOTES
HPI:  Sammy Peoples is a 80y.o. year old female who is here for several issues. Over the last 24 hours has pain in the umbilical area with some swelling. Had a previous CT scan done in the fall that revealed umbilical hernia with incarcerated fat. Her symptoms then resolved. No nausea or vomiting. No fever or chills. Bowels are good. Her cellulitis in the elbow is much better. Some itching that is getting better. Her BP has been much improved and tolerating meds well. Past Medical History:   Diagnosis Date    Arthritis     Cancer ([address]     skin    Chronic pain     back - PT    Diabetes ([address]     PRE DIABETIC    Hypercholesterolemia     Hypertension     Ill-defined condition     N/V    Ill-defined condition     right eye scheduled for cataract removed 2017    Thyroid disease        Past Surgical History:   Procedure Laterality Date    COLONOSCOPY N/A 2021    COLONOSCOPY   :- performed by Becky Lucas MD at [address] HX CATARACT REMOVAL Bilateral 2018    HX  SECTION      HX COLONOSCOPY  16    Dr. Lola Mariee HX HEENT Bilateral 2020    EYE LIFT    HX HEENT  2020    eyelid surgery     HX HEENT  2020    oral sx     HX HYSTERECTOMY      HX LAP CHOLECYSTECTOMY  2017    Momin    HX MALIGNANT SKIN LESION EXCISION  2012    skin cancer from nose - BCCA per pt    HX ORTHOPAEDIC Left 2019    Ankle srugery    HX ORTHOPAEDIC Right 2020    Dr. Pantera Mai Right 2021    Right ankle achilles bone spur repair.  HX OTHER SURGICAL  2020    ORAL SURGERY    [place] BREAST SURGERY PROCEDURE UNLISTED  ,3/2007,     breast biopsy - all benign       Prior to Admission medications    Medication Sig Start Date End Date Taking? Authorizing Provider   triamcinolone acetonide (KENALOG) 0.1 % topical cream Apply  to affected area two (2) times a day.  use thin layer 21  Yes Loree Cruz MD   cloNIDine HCL (CATAPRES) 0.2 mg tablet Take 1 Tablet by mouth two (2) times a day. 6/8/21  Yes Loree Cruz MD   doxycycline (VIBRA-TABS) 100 mg tablet Take 1 Tablet by mouth two (2) times a day for 10 days. 6/4/21 6/14/21 Yes Joaquina VENEGAS MD   metFORMIN ER (GLUCOPHAGE XR) 500 mg tablet TAKE 1 TABLET BY MOUTH ONCE DAILY WITH SUPPER 5/27/21  Yes Loree Cruz MD   rOPINIRole (REQUIP) 1 mg tablet Take 1 tablet by mouth twice daily 5/27/21  Yes Natalee Robledo, NP   fenofibrate nanocrystallized (TRICOR) 145 mg tablet Take 1 tablet by mouth once daily 5/18/21  Yes Loree Cruz MD   levothyroxine (SYNTHROID) 112 mcg tablet TAKE 1 TABLET BY MOUTH BEFORE BREAKFAST. APPOINTMENT REQUIRED FOR FUTURE REFILLS 3/29/21  Yes Loree Cruz MD   chlorthalidone (HYGROTEN) 50 mg tablet Take 1 tablet by mouth once daily 3/29/21  Yes Loree Cruz MD   gabapentin (NEURONTIN) 100 mg capsule TAKE 1 CAPSULE BY MOUTH THREE TIMES DAILY. **MAX DAILY AMOUNT: 300 MG** 3/29/21  Yes Loree Cruz MD   conjugated estrogens (Premarin) 0.625 mg tablet Take 1 tablet by mouth once daily 3/5/21  Yes Loree Cruz MD   Cholecalciferol, Vitamin D3, (VITAMIN D3) 2,000 unit cap capsule Take 2,000 Units by mouth daily. 4/16/18  Yes Loree Cruz MD   vitamin E (AQUA GEMS) 400 unit capsule Take 400 Units by mouth daily. Yes Provider, Historical   GLUCOSAMINE/CHONDROITIN SULF A (GLUCOSAMINE-CHONDROITIN PO) Take  by mouth. 1500mg/1200mg per 2 pills. Takes one pill once daily. Yes Provider, Historical   loratadine (ALLERCLEAR) 10 mg tablet Take 10 mg by mouth daily as needed for Allergies.    Yes Provider, Historical       Social History     Socioeconomic History    Marital status:      Spouse name: Not on file    Number of children: Not on file    Years of education: Not on file    Highest education level: Not on file   Occupational History    Not on file   Tobacco Use    Smoking status: Never Smoker    Smokeless tobacco: Never Used   Substance and Sexual Activity    Alcohol use: Not Currently     Comment: maybe once a year - has a sip    Drug use: No    Sexual activity: Yes     Partners: Male   Other Topics Concern    Not on file   Social History Narrative    Not on file     Social Determinants of Health     Financial Resource Strain:     Difficulty of Paying Living Expenses:    Food Insecurity:     Worried About Running Out of Food in the Last Year:     [address] in the Last Year:    Transportation Needs:     Lack of Transportation (Medical):  Lack of Transportation (Non-Medical):    Physical Activity:     Days of Exercise per Week:     Minutes of Exercise per Session:    Stress:     Feeling of Stress :    Social Connections:     Frequency of Communication with Friends and Family:     Frequency of Social Gatherings with Friends and Family:     Attends Rastafarian Services:     Active Member of Clubs or Organizations:     Attends Club or Organization Meetings:     Marital Status:    Intimate Partner Violence:     Fear of Current or Ex-Partner:     Emotionally Abused:     Physically Abused:     Sexually Abused:           ROS  Per HPI    Visit Vitals  /78 (BP 1 Location: Left arm, BP Patient Position: Sitting, BP Cuff Size: Large adult)   Pulse 64   Temp 97.8 °F (36.6 °C) (Temporal)   Resp 16   Ht 5' 1\" (1.549 m)   Wt 177 lb (80.3 kg)   SpO2 99%   BMI 33.44 kg/m²         Physical Exam   Physical Examination: General appearance - alert, well appearing, and in no distress  Chest - clear to auscultation, no wheezes, rales or rhonchi, symmetric air entry  Heart - normal rate, regular rhythm, normal S1, S2, no murmurs, rubs, clicks or gallops  Abdomen -active bowel sounds. There is mild tenderness around a small umbilical hernia that is easily reducible. Well-healed surgical scar in the midline below that.   There is marked improvement in the erythema of the elbow with no tenderness or fluctuance. Assessment/Plan:  Diagnoses and all orders for this visit:    1. Umbilical hernia, incarcerated previously noted on CT scan. Will refer to surgery to have evaluation for repair.  -     REFERRAL TO SURGERY    2. Cellulitis of right upper extremityresolved. Will complete her antibiotics and let me know if symptoms recur. 3. Essential hypertensionblood pressure well controlled on current meds and tolerating them well. Will continue those at the current dose. Advised her to call back or return to office if symptoms worsen/change/persist.  Discussed expected course/resolution/complications of diagnosis in detail with patient. Medication risks/benefits/costs/interactions/alternatives discussed with patient. She was given an after visit summary which includes diagnoses, current medications, & vitals. She expressed understanding with the diagnosis and plan.

## 2021-06-16 ENCOUNTER — OFFICE VISIT (OUTPATIENT)
Dept: SURGERY | Age: 71
End: 2021-06-16
Payer: MEDICARE

## 2021-06-16 VITALS
WEIGHT: 174.6 LBS | HEIGHT: 61 IN | DIASTOLIC BLOOD PRESSURE: 73 MMHG | TEMPERATURE: 98.2 F | HEART RATE: 66 BPM | BODY MASS INDEX: 32.97 KG/M2 | SYSTOLIC BLOOD PRESSURE: 129 MMHG | OXYGEN SATURATION: 96 % | RESPIRATION RATE: 18 BRPM

## 2021-06-16 DIAGNOSIS — K42.9 RECURRENT UMBILICAL HERNIA: Primary | ICD-10-CM

## 2021-06-16 PROCEDURE — 1101F PT FALLS ASSESS-DOCD LE1/YR: CPT | Performed by: SURGERY

## 2021-06-16 PROCEDURE — G8752 SYS BP LESS 140: HCPCS | Performed by: SURGERY

## 2021-06-16 PROCEDURE — 3017F COLORECTAL CA SCREEN DOC REV: CPT | Performed by: SURGERY

## 2021-06-16 PROCEDURE — 1090F PRES/ABSN URINE INCON ASSESS: CPT | Performed by: SURGERY

## 2021-06-16 PROCEDURE — 99203 OFFICE O/P NEW LOW 30 MIN: CPT | Performed by: SURGERY

## 2021-06-16 PROCEDURE — G9899 SCRN MAM PERF RSLTS DOC: HCPCS | Performed by: SURGERY

## 2021-06-16 PROCEDURE — G8399 PT W/DXA RESULTS DOCUMENT: HCPCS | Performed by: SURGERY

## 2021-06-16 PROCEDURE — G8536 NO DOC ELDER MAL SCRN: HCPCS | Performed by: SURGERY

## 2021-06-16 PROCEDURE — G8510 SCR DEP NEG, NO PLAN REQD: HCPCS | Performed by: SURGERY

## 2021-06-16 PROCEDURE — G8417 CALC BMI ABV UP PARAM F/U: HCPCS | Performed by: SURGERY

## 2021-06-16 PROCEDURE — G8754 DIAS BP LESS 90: HCPCS | Performed by: SURGERY

## 2021-06-16 PROCEDURE — G8427 DOCREV CUR MEDS BY ELIG CLIN: HCPCS | Performed by: SURGERY

## 2021-06-16 NOTE — LETTER
6/17/2021    Patient: Carlos Israel   YOB: 1950   Date of Visit: 6/16/2021     Terri Pardo MD  [address] [place] [address]    Dear Terri Pardo MD,      Thank you for referring Ms. Kevin Cao to [facility] for evaluation. My notes for this consultation are attached. If you have questions, please do not hesitate to call me. I look forward to following your patient along with you.       Sincerely,    Shauna Daniels MD

## 2021-06-16 NOTE — PROGRESS NOTES
1. Have you been to the ER, urgent care clinic since your last visit? Hospitalized since your last visit? No    2. Have you seen or consulted any other health care providers outside of the [address] [place] [address] since your last visit? Include any pap smears or colon screening.  No

## 2021-06-17 NOTE — H&P (VIEW-ONLY)
[facility] at [address] [place] [address] Surgery History and Physical    History of Present Illness:      Bhupinder Ocampo is a 80 y.o. female who has an umbilical hernia. She has had the umbilical hernia for a few years. She has a previous history of a laparoscopic cholecystectomy done by Dr. Haley Sutton in 1415 and had a umbilical hernia repair with 0 Vicryl suture at the time of surgery. She has had a recurrence of the bulge and some pain at the umbilicus. The pain in general is a 2-3 out of 10. She does not have any nausea vomiting. The pain occurs with heavy lifting and straining. Past Medical History:   Diagnosis Date    Arthritis     Cancer ([address]     skin    Chronic pain     back - PT    Diabetes ([facility] [address]     PRE DIABETIC    Hypercholesterolemia     Hypertension     Ill-defined condition     N/V    Ill-defined condition     right eye scheduled for cataract removed 2017    Thyroid disease        Past Surgical History:   Procedure Laterality Date    COLONOSCOPY N/A 2021    COLONOSCOPY   :- performed by Brennen Sanchez MD at [address] HX CATARACT REMOVAL Bilateral 2018    HX  SECTION      HX COLONOSCOPY  16    Dr. Sheeba Echevarria HX HEENT Bilateral 2020    EYE LIFT    HX HEENT  2020    eyelid surgery     HX HEENT      oral sx     HX HYSTERECTOMY      HX LAP CHOLECYSTECTOMY  2017    Momin    HX MALIGNANT SKIN LESION EXCISION  2012    skin cancer from nose - BCCA per pt    HX ORTHOPAEDIC Left 2019    Ankle srugery    HX ORTHOPAEDIC Right 2020    Dr. Rothman Pen Right 2021    Right ankle achilles bone spur repair.  HX OTHER SURGICAL  2020    ORAL SURGERY    MD BREAST SURGERY PROCEDURE UNLISTED  ,3/2007,     breast biopsy - all benign         Current Outpatient Medications:     triamcinolone acetonide (KENALOG) 0.1 % topical cream, Apply  to affected area two (2) times a day.  use thin layer, Disp: 45 g, Rfl: 1    cloNIDine HCL (CATAPRES) 0.2 mg tablet, Take 1 Tablet by mouth two (2) times a day., Disp: 180 Tablet, Rfl: 1    metFORMIN ER (GLUCOPHAGE XR) 500 mg tablet, TAKE 1 TABLET BY MOUTH ONCE DAILY WITH SUPPER, Disp: 90 Tablet, Rfl: 0    rOPINIRole (REQUIP) 1 mg tablet, Take 1 tablet by mouth twice daily, Disp: 60 Tablet, Rfl: 0    fenofibrate nanocrystallized (TRICOR) 145 mg tablet, Take 1 tablet by mouth once daily, Disp: 90 Tab, Rfl: 0    levothyroxine (SYNTHROID) 112 mcg tablet, TAKE 1 TABLET BY MOUTH BEFORE BREAKFAST. APPOINTMENT REQUIRED FOR FUTURE REFILLS, Disp: 90 Tab, Rfl: 0    chlorthalidone (HYGROTEN) 50 mg tablet, Take 1 tablet by mouth once daily, Disp: 90 Tab, Rfl: 0    gabapentin (NEURONTIN) 100 mg capsule, TAKE 1 CAPSULE BY MOUTH THREE TIMES DAILY. **MAX DAILY AMOUNT: 300 MG**, Disp: 90 Cap, Rfl: 0    conjugated estrogens (Premarin) 0.625 mg tablet, Take 1 tablet by mouth once daily, Disp: 90 Tab, Rfl: 0    Cholecalciferol, Vitamin D3, (VITAMIN D3) 2,000 unit cap capsule, Take 2,000 Units by mouth daily. , Disp: 90 Cap, Rfl: 0    vitamin E (AQUA GEMS) 400 unit capsule, Take 400 Units by mouth daily. , Disp: , Rfl:     GLUCOSAMINE/CHONDROITIN SULF A (GLUCOSAMINE-CHONDROITIN PO), Take  by mouth. 1500mg/1200mg per 2 pills. Takes one pill once daily. , Disp: , Rfl:     loratadine (ALLERCLEAR) 10 mg tablet, Take 10 mg by mouth daily as needed for Allergies. , Disp: , Rfl:     Allergies   Allergen Reactions    Codeine Other (comments)     Hallucination/room spinning    Sudafed [Pseudoephedrine Hcl] Palpitations    Darvocet A500 [Propoxyphene N-Acetaminophen] Nausea and Vomiting       Social History     Socioeconomic History    Marital status:      Spouse name: Not on file    Number of children: Not on file    Years of education: Not on file    Highest education level: Not on file   Occupational History    Not on file   Tobacco Use    Smoking status: Never Smoker    Smokeless tobacco: Never Used   Substance and Sexual Activity    Alcohol use: Not Currently     Comment: maybe once a year - has a sip    Drug use: No    Sexual activity: Yes     Partners: Male   Other Topics Concern    Not on file   Social History Narrative    Not on file     Social Determinants of Health     Financial Resource Strain:     Difficulty of Paying Living Expenses:    Food Insecurity:     Worried About Running Out of Food in the Last Year:     [address] in the Last Year:    Transportation Needs:     Lack of Transportation (Medical):      Lack of Transportation (Non-Medical):    Physical Activity:     Days of Exercise per Week:     Minutes of Exercise per Session:    Stress:     Feeling of Stress :    Social Connections:     Frequency of Communication with Friends and Family:     Frequency of Social Gatherings with Friends and Family:     Attends Yazdanism Services:     Active Member of Clubs or Organizations:     Attends Club or Organization Meetings:     Marital Status:    Intimate Partner Violence:     Fear of Current or Ex-Partner:     Emotionally Abused:     Physically Abused:     Sexually Abused:        Family History   Problem Relation Age of Onset    Heart Disease Father         CAD, CABG    Diabetes Father         insulin    Hypertension Mother     Cancer Mother         nose - skin - BCCA    Hypertension Brother     Other Brother         HEART MURMUR    Hypertension Brother     Other Brother         PVD    Kidney Disease Brother         dialysis    Diabetes Brother         insulin    Anesth Problems Neg Hx        ROS   Constitutional: negative  Ears, Nose, Mouth, Throat, and Face: negative  Respiratory: negative  Cardiovascular: negative  Gastrointestinal: Umbilical hernia  Genitourinary:negative  Integument/Breast: negative  Hematologic/Lymphatic: negative  Behavioral/Psychiatric: negative  Allergic/Immunologic: negative      Physical Exam:     Visit Vitals  /73 (BP 1 Location: Left arm, BP Patient Position: Sitting, BP Cuff Size: Adult)   Pulse 66   Temp 98.2 °F (36.8 °C) (Oral)   Resp 18   Ht 5' 1\" (1.549 m)   Wt 174 lb 9.6 oz (79.2 kg)   SpO2 96%   BMI 32.99 kg/m²       General - alert and oriented, no apparent distress  HEENT - no jaundice, no hearing imparement  Pulm - CTAB, no C/W/R  CV - RRR, no M/R/G  Abd -soft, nondistended, bowel sounds present, some central obesity, umbilical hernia defect appears to be about a centimeter in size, soft and reducible, minimal tenderness to palpation  Ext - pulses intact in UE and LE bilaterally, no edema  Skin - supple, no rashes  Psychiatric - normal affect, good mood    Labs  Lab Results   Component Value Date/Time    Sodium 139 09/14/2020 12:55 PM    Potassium 3.4 (L) 09/14/2020 12:55 PM    Chloride 101 09/14/2020 12:55 PM    CO2 29 09/14/2020 12:55 PM    Anion gap 9 09/14/2020 12:55 PM    Glucose 100 09/14/2020 12:55 PM    BUN 32 (H) 09/14/2020 12:55 PM    Creatinine 1.09 (H) 09/14/2020 12:55 PM    BUN/Creatinine ratio 29 (H) 09/14/2020 12:55 PM    GFR est AA >60 09/14/2020 12:55 PM    GFR est non-AA 50 (L) 09/14/2020 12:55 PM    Calcium 9.1 09/14/2020 12:55 PM    Bilirubin, total <0.2 09/15/2020 10:21 AM    Alk. phosphatase 39 09/15/2020 10:21 AM    Protein, total 6.8 09/15/2020 10:21 AM    Albumin 4.4 09/15/2020 10:21 AM    Globulin 3.7 09/14/2020 12:55 PM    A-G Ratio 1.0 (L) 09/14/2020 12:55 PM    ALT (SGPT) 9 09/15/2020 10:21 AM    AST (SGOT) 10 09/15/2020 10:21 AM     Lab Results   Component Value Date/Time    WBC 10.1 09/14/2020 12:55 PM    Hemoglobin (POC) 11.8 11/20/2017 10:22 AM    HGB 13.0 09/14/2020 12:55 PM    HCT 40.1 09/14/2020 12:55 PM    PLATELET 028 41/05/2298 12:55 PM    MCV 85.7 09/14/2020 12:55 PM         Imaging  none  I have reviewed and agree with all of the pertinent images    Assessment:     Sammy Peoples is a 80 y.o. female with recurrent umbilical hernia. Recommendations:     1. She does appear to have a small recurrent umbilical hernia likely about a centimeter or so in size. I will plan on repairing this open since her previous repair was not a complete umbilical hernia repair and was a partial laparoscopic repair with absorbable stitches. It is possible she might need mesh but given the size appears to be small seems less likely. I have discussed the above procedure with the patient in detail. We reviewed the benefits and possible complications of the surgery which include bleeding, infection, damage to adjacent organs, venous thromboembolism, need for repeat surgery, death and other unforseen complications. The patient agreed to proceed with the surgery. Vane Gorman MD    Greater than half of the time: 30 minutes was used in counciling the patient about diagnosis and treatment plan    Ms. Khloe Scherer has a reminder for a \"due or due soon\" health maintenance. I have asked that she contact her primary care provider for follow-up on this health maintenance.

## 2021-06-17 NOTE — PROGRESS NOTES
[facility] at [facility] Surgery History and Physical    History of Present Illness:      Vincent Guajardo is a 80 y.o. female who has an umbilical hernia. She has had the umbilical hernia for a few years. She has a previous history of a laparoscopic cholecystectomy done by Dr. Philip Polk in  and had a umbilical hernia repair with 0 Vicryl suture at the time of surgery. She has had a recurrence of the bulge and some pain at the umbilicus. The pain in general is a 2-3 out of 10. She does not have any nausea vomiting. The pain occurs with heavy lifting and straining. Past Medical History:   Diagnosis Date    Arthritis     Cancer ([address]     skin    Chronic pain     back - PT    Diabetes ([facility] [address] [facility]     PRE DIABETIC    Hypercholesterolemia     Hypertension     Ill-defined condition     N/V    Ill-defined condition     right eye scheduled for cataract removed 2017    Thyroid disease        Past Surgical History:   Procedure Laterality Date    COLONOSCOPY N/A 2021    COLONOSCOPY   :- performed by Arnaldo Valentin MD at [address] HX CATARACT REMOVAL Bilateral 2018    HX  SECTION      HX COLONOSCOPY  16    Dr. Nadege Cook HX HEENT Bilateral 2020    EYE LIFT    HX HEENT  2020    eyelid surgery     HX HEENT      oral sx     HX HYSTERECTOMY      HX LAP CHOLECYSTECTOMY  2017    Momin    HX MALIGNANT SKIN LESION EXCISION  2012    skin cancer from nose - BCCA per pt    HX ORTHOPAEDIC Left 2019    Ankle srugery    HX ORTHOPAEDIC Right 2020    Dr. Chuck Evangelista Right 2021    Right ankle achilles bone spur repair.  HX OTHER SURGICAL  2020    ORAL SURGERY    CO BREAST SURGERY PROCEDURE UNLISTED  ,3/2007,     breast biopsy - all benign         Current Outpatient Medications:     triamcinolone acetonide (KENALOG) 0.1 % topical cream, Apply  to affected area two (2) times a day.  use thin layer, Disp: 45 g, Rfl: 1    cloNIDine HCL (CATAPRES) 0.2 mg tablet, Take 1 Tablet by mouth two (2) times a day., Disp: 180 Tablet, Rfl: 1    metFORMIN ER (GLUCOPHAGE XR) 500 mg tablet, TAKE 1 TABLET BY MOUTH ONCE DAILY WITH SUPPER, Disp: 90 Tablet, Rfl: 0    rOPINIRole (REQUIP) 1 mg tablet, Take 1 tablet by mouth twice daily, Disp: 60 Tablet, Rfl: 0    fenofibrate nanocrystallized (TRICOR) 145 mg tablet, Take 1 tablet by mouth once daily, Disp: 90 Tab, Rfl: 0    levothyroxine (SYNTHROID) 112 mcg tablet, TAKE 1 TABLET BY MOUTH BEFORE BREAKFAST. APPOINTMENT REQUIRED FOR FUTURE REFILLS, Disp: 90 Tab, Rfl: 0    chlorthalidone (HYGROTEN) 50 mg tablet, Take 1 tablet by mouth once daily, Disp: 90 Tab, Rfl: 0    gabapentin (NEURONTIN) 100 mg capsule, TAKE 1 CAPSULE BY MOUTH THREE TIMES DAILY. **MAX DAILY AMOUNT: 300 MG**, Disp: 90 Cap, Rfl: 0    conjugated estrogens (Premarin) 0.625 mg tablet, Take 1 tablet by mouth once daily, Disp: 90 Tab, Rfl: 0    Cholecalciferol, Vitamin D3, (VITAMIN D3) 2,000 unit cap capsule, Take 2,000 Units by mouth daily. , Disp: 90 Cap, Rfl: 0    vitamin E (AQUA GEMS) 400 unit capsule, Take 400 Units by mouth daily. , Disp: , Rfl:     GLUCOSAMINE/CHONDROITIN SULF A (GLUCOSAMINE-CHONDROITIN PO), Take  by mouth. 1500mg/1200mg per 2 pills. Takes one pill once daily. , Disp: , Rfl:     loratadine (ALLERCLEAR) 10 mg tablet, Take 10 mg by mouth daily as needed for Allergies. , Disp: , Rfl:     Allergies   Allergen Reactions    Codeine Other (comments)     Hallucination/room spinning    Sudafed [Pseudoephedrine Hcl] Palpitations    Darvocet A500 [Propoxyphene N-Acetaminophen] Nausea and Vomiting       Social History     Socioeconomic History    Marital status:      Spouse name: Not on file    Number of children: Not on file    Years of education: Not on file    Highest education level: Not on file   Occupational History    Not on file   Tobacco Use    Smoking status: Never Smoker    Smokeless tobacco: Never Used   Substance and Sexual Activity    Alcohol use: Not Currently     Comment: maybe once a year - has a sip    Drug use: No    Sexual activity: Yes     Partners: Male   Other Topics Concern    Not on file   Social History Narrative    Not on file     Social Determinants of Health     Financial Resource Strain:     Difficulty of Paying Living Expenses:    Food Insecurity:     Worried About Running Out of Food in the Last Year:     [address] in the Last Year:    Transportation Needs:     Lack of Transportation (Medical):      Lack of Transportation (Non-Medical):    Physical Activity:     Days of Exercise per Week:     Minutes of Exercise per Session:    Stress:     Feeling of Stress :    Social Connections:     Frequency of Communication with Friends and Family:     Frequency of Social Gatherings with Friends and Family:     Attends Caodaism Services:     Active Member of Clubs or Organizations:     Attends Club or Organization Meetings:     Marital Status:    Intimate Partner Violence:     Fear of Current or Ex-Partner:     Emotionally Abused:     Physically Abused:     Sexually Abused:        Family History   Problem Relation Age of Onset    Heart Disease Father         CAD, CABG    Diabetes Father         insulin    Hypertension Mother     Cancer Mother         nose - skin - BCCA    Hypertension Brother     Other Brother         HEART MURMUR    Hypertension Brother     Other Brother         PVD    Kidney Disease Brother         dialysis    Diabetes Brother         insulin    Anesth Problems Neg Hx        ROS   Constitutional: negative  Ears, Nose, Mouth, Throat, and Face: negative  Respiratory: negative  Cardiovascular: negative  Gastrointestinal: Umbilical hernia  Genitourinary:negative  Integument/Breast: negative  Hematologic/Lymphatic: negative  Behavioral/Psychiatric: negative  Allergic/Immunologic: negative      Physical Exam:     Visit Vitals  /73 (BP 1 Location: Left arm, BP Patient Position: Sitting, BP Cuff Size: Adult)   Pulse 66   Temp 98.2 °F (36.8 °C) (Oral)   Resp 18   Ht 5' 1\" (1.549 m)   Wt 174 lb 9.6 oz (79.2 kg)   SpO2 96%   BMI 32.99 kg/m²       General - alert and oriented, no apparent distress  HEENT - no jaundice, no hearing imparement  Pulm - CTAB, no C/W/R  CV - RRR, no M/R/G  Abd -soft, nondistended, bowel sounds present, some central obesity, umbilical hernia defect appears to be about a centimeter in size, soft and reducible, minimal tenderness to palpation  Ext - pulses intact in UE and LE bilaterally, no edema  Skin - supple, no rashes  Psychiatric - normal affect, good mood    Labs  Lab Results   Component Value Date/Time    Sodium 139 09/14/2020 12:55 PM    Potassium 3.4 (L) 09/14/2020 12:55 PM    Chloride 101 09/14/2020 12:55 PM    CO2 29 09/14/2020 12:55 PM    Anion gap 9 09/14/2020 12:55 PM    Glucose 100 09/14/2020 12:55 PM    BUN 32 (H) 09/14/2020 12:55 PM    Creatinine 1.09 (H) 09/14/2020 12:55 PM    BUN/Creatinine ratio 29 (H) 09/14/2020 12:55 PM    GFR est AA >60 09/14/2020 12:55 PM    GFR est non-AA 50 (L) 09/14/2020 12:55 PM    Calcium 9.1 09/14/2020 12:55 PM    Bilirubin, total <0.2 09/15/2020 10:21 AM    Alk. phosphatase 39 09/15/2020 10:21 AM    Protein, total 6.8 09/15/2020 10:21 AM    Albumin 4.4 09/15/2020 10:21 AM    Globulin 3.7 09/14/2020 12:55 PM    A-G Ratio 1.0 (L) 09/14/2020 12:55 PM    ALT (SGPT) 9 09/15/2020 10:21 AM    AST (SGOT) 10 09/15/2020 10:21 AM     Lab Results   Component Value Date/Time    WBC 10.1 09/14/2020 12:55 PM    Hemoglobin (POC) 11.8 11/20/2017 10:22 AM    HGB 13.0 09/14/2020 12:55 PM    HCT 40.1 09/14/2020 12:55 PM    PLATELET 903 47/02/1903 12:55 PM    MCV 85.7 09/14/2020 12:55 PM         Imaging  none  I have reviewed and agree with all of the pertinent images    Assessment:     Donovan Mulligan is a 80 y.o. female with recurrent umbilical hernia. Recommendations:     1. She does appear to have a small recurrent umbilical hernia likely about a centimeter or so in size. I will plan on repairing this open since her previous repair was not a complete umbilical hernia repair and was a partial laparoscopic repair with absorbable stitches. It is possible she might need mesh but given the size appears to be small seems less likely. I have discussed the above procedure with the patient in detail. We reviewed the benefits and possible complications of the surgery which include bleeding, infection, damage to adjacent organs, venous thromboembolism, need for repeat surgery, death and other unforseen complications. The patient agreed to proceed with the surgery. Clifton Park MD    Greater than half of the time: 30 minutes was used in counciling the patient about diagnosis and treatment plan    Ms. Jonathan Eason has a reminder for a \"due or due soon\" health maintenance. I have asked that she contact her primary care provider for follow-up on this health maintenance.

## 2021-06-18 ENCOUNTER — TELEPHONE (OUTPATIENT)
Dept: SURGERY | Age: 71
End: 2021-06-18

## 2021-06-18 NOTE — TELEPHONE ENCOUNTER
Patient stated her pain has gotten worse and it has spread to side and lower back and is experiencing bloating. Wanted to know if there is anything she can do to cope with the pain or anything she can take until her surgery.

## 2021-06-18 NOTE — TELEPHONE ENCOUNTER
Returned call to Ms Richards Sona verified all PHI. Reviewed notes patient is scheduled for Recurrent Umbilical Hernia Repair Surgery on 7/20/21. Patient states the pain has moved around to the right side and extends to the back. If she moves a certain way it increases to 8/10. The pain currently is a 2/10 while resting. Patient states she has taken tylenol with no relief. Ms Susie Magallanes is asking if possible could her surgery date be moved up? I suggested she try some OTC Motrin or use a support girdle. Message forwarded to the  with regards to the surgery date.

## 2021-06-21 RX ORDER — ROPINIROLE 1 MG/1
TABLET, FILM COATED ORAL
Qty: 60 TABLET | Refills: 0 | Status: SHIPPED | OUTPATIENT
Start: 2021-06-21 | End: 2021-07-19

## 2021-06-23 ENCOUNTER — TELEPHONE (OUTPATIENT)
Dept: INTERNAL MEDICINE CLINIC | Age: 71
End: 2021-06-23

## 2021-06-23 ENCOUNTER — HOSPITAL ENCOUNTER (OUTPATIENT)
Dept: PREADMISSION TESTING | Age: 71
Discharge: HOME | End: 2021-06-23
Payer: MEDICARE

## 2021-06-23 VITALS
SYSTOLIC BLOOD PRESSURE: 126 MMHG | RESPIRATION RATE: 18 BRPM | BODY MASS INDEX: 33.84 KG/M2 | HEART RATE: 56 BPM | WEIGHT: 179.23 LBS | DIASTOLIC BLOOD PRESSURE: 77 MMHG | TEMPERATURE: 97.8 F | HEIGHT: 61 IN

## 2021-06-23 LAB
ANION GAP SERPL CALC-SCNC: 5 MMOL/L (ref 5–15)
ATRIAL RATE: 55 BPM
BASOPHILS # BLD: 0.1 K/UL (ref 0–0.1)
BASOPHILS NFR BLD: 1 % (ref 0–1)
BUN SERPL-MCNC: 30 MG/DL (ref 6–20)
BUN/CREAT SERPL: 30 (ref 12–20)
CALCIUM SERPL-MCNC: 9.8 MG/DL (ref 8.5–10.1)
CALCULATED P AXIS, ECG09: 49 DEGREES
CALCULATED R AXIS, ECG10: 15 DEGREES
CALCULATED T AXIS, ECG11: 3 DEGREES
CHLORIDE SERPL-SCNC: 106 MMOL/L (ref 97–108)
CO2 SERPL-SCNC: 29 MMOL/L (ref 21–32)
CREAT SERPL-MCNC: 0.99 MG/DL (ref 0.55–1.02)
DIAGNOSIS, 93000: NORMAL
DIFFERENTIAL METHOD BLD: NORMAL
EOSINOPHIL # BLD: 0.2 K/UL (ref 0–0.4)
EOSINOPHIL NFR BLD: 3 % (ref 0–7)
ERYTHROCYTE [DISTWIDTH] IN BLOOD BY AUTOMATED COUNT: 13.2 % (ref 11.5–14.5)
EST. AVERAGE GLUCOSE BLD GHB EST-MCNC: 126 MG/DL
GLUCOSE SERPL-MCNC: 114 MG/DL (ref 65–100)
HBA1C MFR BLD: 6 % (ref 4–5.6)
HCT VFR BLD AUTO: 36.4 % (ref 35–47)
HGB BLD-MCNC: 11.6 G/DL (ref 11.5–16)
IMM GRANULOCYTES # BLD AUTO: 0 K/UL (ref 0–0.04)
IMM GRANULOCYTES NFR BLD AUTO: 0 % (ref 0–0.5)
LYMPHOCYTES # BLD: 2.4 K/UL (ref 0.8–3.5)
LYMPHOCYTES NFR BLD: 38 % (ref 12–49)
MCH RBC QN AUTO: 28.6 PG (ref 26–34)
MCHC RBC AUTO-ENTMCNC: 31.9 G/DL (ref 30–36.5)
MCV RBC AUTO: 89.7 FL (ref 80–99)
MONOCYTES # BLD: 0.4 K/UL (ref 0–1)
MONOCYTES NFR BLD: 7 % (ref 5–13)
NEUTS SEG # BLD: 3.2 K/UL (ref 1.8–8)
NEUTS SEG NFR BLD: 51 % (ref 32–75)
NRBC # BLD: 0 K/UL (ref 0–0.01)
NRBC BLD-RTO: 0 PER 100 WBC
P-R INTERVAL, ECG05: 158 MS
PLATELET # BLD AUTO: 273 K/UL (ref 150–400)
PMV BLD AUTO: 11.8 FL (ref 8.9–12.9)
POTASSIUM SERPL-SCNC: 3.7 MMOL/L (ref 3.5–5.1)
Q-T INTERVAL, ECG07: 450 MS
QRS DURATION, ECG06: 86 MS
QTC CALCULATION (BEZET), ECG08: 430 MS
RBC # BLD AUTO: 4.06 M/UL (ref 3.8–5.2)
SODIUM SERPL-SCNC: 140 MMOL/L (ref 136–145)
VENTRICULAR RATE, ECG03: 55 BPM
WBC # BLD AUTO: 6.2 K/UL (ref 3.6–11)

## 2021-06-23 PROCEDURE — 83036 HEMOGLOBIN GLYCOSYLATED A1C: CPT

## 2021-06-23 PROCEDURE — 80048 BASIC METABOLIC PNL TOTAL CA: CPT

## 2021-06-23 PROCEDURE — 93005 ELECTROCARDIOGRAM TRACING: CPT

## 2021-06-23 PROCEDURE — 85025 COMPLETE CBC W/AUTO DIFF WBC: CPT

## 2021-06-23 PROCEDURE — 36415 COLL VENOUS BLD VENIPUNCTURE: CPT

## 2021-06-23 NOTE — TELEPHONE ENCOUNTER
Patient states her pharmacy let her know the Premarin needs a prior authorization. Asked if you could take care of that. Also, needs to know whether she can take the Premarin the night before her surgery on Friday -- so that would be tomorrow night. Patient states we MAY respond via [employer].

## 2021-06-23 NOTE — PERIOP NOTES
MODERNA 01/27/21 & 02/24/21 COPY PLACED ON CHART     PRE-OPERATIVE INSTRUCTIONS REVIEWED WITH PATIENT. PT GIVEN TIME TO ASK QUESTIONS  PATIENT GIVEN 2-BOTTLE OF CHG SOAP. REVIEWED  PATIENT GIVEN [facility]SI INFECTION FAQ SHEET.     INSTRUCTIONS GIVEN AND REVIEWED ON ADMISSION PROCESS    EKG PERFORMED

## 2021-06-25 ENCOUNTER — ANESTHESIA (OUTPATIENT)
Dept: SURGERY | Age: 71
End: 2021-06-25
Payer: MEDICARE

## 2021-06-25 ENCOUNTER — HOSPITAL ENCOUNTER (OUTPATIENT)
Age: 71
Setting detail: OUTPATIENT SURGERY
Discharge: HOME | End: 2021-06-25
Attending: SURGERY | Admitting: SURGERY
Payer: MEDICARE

## 2021-06-25 ENCOUNTER — ANESTHESIA EVENT (OUTPATIENT)
Dept: SURGERY | Age: 71
End: 2021-06-25
Payer: MEDICARE

## 2021-06-25 VITALS
HEIGHT: 61 IN | WEIGHT: 179.23 LBS | BODY MASS INDEX: 33.84 KG/M2 | RESPIRATION RATE: 13 BRPM | HEART RATE: 60 BPM | DIASTOLIC BLOOD PRESSURE: 68 MMHG | OXYGEN SATURATION: 95 % | SYSTOLIC BLOOD PRESSURE: 138 MMHG | TEMPERATURE: 97.5 F

## 2021-06-25 DIAGNOSIS — K42.9 RECURRENT UMBILICAL HERNIA: Primary | ICD-10-CM

## 2021-06-25 LAB
GLUCOSE BLD STRIP.AUTO-MCNC: 107 MG/DL (ref 65–117)
GLUCOSE BLD STRIP.AUTO-MCNC: 109 MG/DL (ref 65–117)
SERVICE CMNT-IMP: NORMAL
SERVICE CMNT-IMP: NORMAL

## 2021-06-25 PROCEDURE — 77030040361 HC SLV COMPR DVT MDII -B: Performed by: SURGERY

## 2021-06-25 PROCEDURE — 74011000250 HC RX REV CODE- 250: Performed by: NURSE ANESTHETIST, CERTIFIED REGISTERED

## 2021-06-25 PROCEDURE — C1781 MESH (IMPLANTABLE): HCPCS | Performed by: SURGERY

## 2021-06-25 PROCEDURE — 77030010507 HC ADH SKN DERMBND J&J -B: Performed by: SURGERY

## 2021-06-25 PROCEDURE — 76210000016 HC OR PH I REC 1 TO 1.5 HR: Performed by: SURGERY

## 2021-06-25 PROCEDURE — 76060000035 HC ANESTHESIA 2 TO 2.5 HR: Performed by: SURGERY

## 2021-06-25 PROCEDURE — 77030008684 HC TU ET CUF COVD -B: Performed by: ANESTHESIOLOGY

## 2021-06-25 PROCEDURE — 77030040922 HC BLNKT HYPOTHRM STRY -A

## 2021-06-25 PROCEDURE — 77030018548 HC SUT ETHBND2 J&J -B: Performed by: SURGERY

## 2021-06-25 PROCEDURE — 74011250636 HC RX REV CODE- 250/636: Performed by: SURGERY

## 2021-06-25 PROCEDURE — 82962 GLUCOSE BLOOD TEST: CPT

## 2021-06-25 PROCEDURE — 77030031139 HC SUT VCRL2 J&J -A: Performed by: SURGERY

## 2021-06-25 PROCEDURE — 76010000153 HC OR TIME 1.5 TO 2 HR: Performed by: SURGERY

## 2021-06-25 PROCEDURE — 77030002933 HC SUT MCRYL J&J -A: Performed by: SURGERY

## 2021-06-25 PROCEDURE — 77030018831 HC SOL IRR H20 BAXT -A: Performed by: SURGERY

## 2021-06-25 PROCEDURE — 76210000020 HC REC RM PH II FIRST 0.5 HR: Performed by: SURGERY

## 2021-06-25 PROCEDURE — 77030042556 HC PNCL CAUT -B: Performed by: SURGERY

## 2021-06-25 PROCEDURE — 2709999900 HC NON-CHARGEABLE SUPPLY: Performed by: SURGERY

## 2021-06-25 PROCEDURE — 74011000250 HC RX REV CODE- 250: Performed by: SURGERY

## 2021-06-25 PROCEDURE — 74011250636 HC RX REV CODE- 250/636

## 2021-06-25 PROCEDURE — 77030002916 HC SUT ETHLN J&J -A: Performed by: SURGERY

## 2021-06-25 PROCEDURE — 74011250636 HC RX REV CODE- 250/636: Performed by: NURSE ANESTHETIST, CERTIFIED REGISTERED

## 2021-06-25 PROCEDURE — 49585 PR REPAIR UMBILICAL HERN,5+Y/O,REDUC: CPT | Performed by: SURGERY

## 2021-06-25 PROCEDURE — 74011250636 HC RX REV CODE- 250/636: Performed by: ANESTHESIOLOGY

## 2021-06-25 DEVICE — VENTRALEX ST HERNIA PATCH, 6.4 CM (2.5"), CIRCLE
Type: IMPLANTABLE DEVICE | Site: UMBILICAL | Status: FUNCTIONAL
Brand: VENTRALEX

## 2021-06-25 RX ORDER — ONDANSETRON 2 MG/ML
4 INJECTION INTRAMUSCULAR; INTRAVENOUS AS NEEDED
Status: DISCONTINUED | OUTPATIENT
Start: 2021-06-25 | End: 2021-06-25 | Stop reason: HOSPADM

## 2021-06-25 RX ORDER — MIDAZOLAM HYDROCHLORIDE 1 MG/ML
0.5 INJECTION, SOLUTION INTRAMUSCULAR; INTRAVENOUS
Status: DISCONTINUED | OUTPATIENT
Start: 2021-06-25 | End: 2021-06-25 | Stop reason: HOSPADM

## 2021-06-25 RX ORDER — OXYCODONE AND ACETAMINOPHEN 5; 325 MG/1; MG/1
1 TABLET ORAL AS NEEDED
Status: DISCONTINUED | OUTPATIENT
Start: 2021-06-25 | End: 2021-06-25 | Stop reason: HOSPADM

## 2021-06-25 RX ORDER — SODIUM CHLORIDE 9 MG/ML
50 INJECTION, SOLUTION INTRAVENOUS CONTINUOUS
Status: DISCONTINUED | OUTPATIENT
Start: 2021-06-25 | End: 2021-06-25 | Stop reason: HOSPADM

## 2021-06-25 RX ORDER — KETOROLAC TROMETHAMINE 30 MG/ML
15 INJECTION, SOLUTION INTRAMUSCULAR; INTRAVENOUS
Status: COMPLETED | OUTPATIENT
Start: 2021-06-25 | End: 2021-06-25

## 2021-06-25 RX ORDER — GLYCOPYRROLATE 0.2 MG/ML
INJECTION INTRAMUSCULAR; INTRAVENOUS AS NEEDED
Status: DISCONTINUED | OUTPATIENT
Start: 2021-06-25 | End: 2021-06-25 | Stop reason: HOSPADM

## 2021-06-25 RX ORDER — SODIUM CHLORIDE 0.9 % (FLUSH) 0.9 %
5-40 SYRINGE (ML) INJECTION EVERY 8 HOURS
Status: DISCONTINUED | OUTPATIENT
Start: 2021-06-25 | End: 2021-06-25 | Stop reason: HOSPADM

## 2021-06-25 RX ORDER — FENTANYL CITRATE 50 UG/ML
25 INJECTION, SOLUTION INTRAMUSCULAR; INTRAVENOUS
Status: DISCONTINUED | OUTPATIENT
Start: 2021-06-25 | End: 2021-06-25 | Stop reason: HOSPADM

## 2021-06-25 RX ORDER — FENTANYL CITRATE 50 UG/ML
50 INJECTION, SOLUTION INTRAMUSCULAR; INTRAVENOUS AS NEEDED
Status: DISCONTINUED | OUTPATIENT
Start: 2021-06-25 | End: 2021-06-25 | Stop reason: HOSPADM

## 2021-06-25 RX ORDER — DEXAMETHASONE SODIUM PHOSPHATE 4 MG/ML
INJECTION, SOLUTION INTRA-ARTICULAR; INTRALESIONAL; INTRAMUSCULAR; INTRAVENOUS; SOFT TISSUE AS NEEDED
Status: DISCONTINUED | OUTPATIENT
Start: 2021-06-25 | End: 2021-06-25 | Stop reason: HOSPADM

## 2021-06-25 RX ORDER — HYDROMORPHONE HYDROCHLORIDE 1 MG/ML
0.2 INJECTION, SOLUTION INTRAMUSCULAR; INTRAVENOUS; SUBCUTANEOUS
Status: DISCONTINUED | OUTPATIENT
Start: 2021-06-25 | End: 2021-06-25 | Stop reason: HOSPADM

## 2021-06-25 RX ORDER — ROCURONIUM BROMIDE 10 MG/ML
INJECTION, SOLUTION INTRAVENOUS AS NEEDED
Status: DISCONTINUED | OUTPATIENT
Start: 2021-06-25 | End: 2021-06-25 | Stop reason: HOSPADM

## 2021-06-25 RX ORDER — BUPIVACAINE HYDROCHLORIDE AND EPINEPHRINE 5; 5 MG/ML; UG/ML
INJECTION, SOLUTION EPIDURAL; INTRACAUDAL; PERINEURAL AS NEEDED
Status: DISCONTINUED | OUTPATIENT
Start: 2021-06-25 | End: 2021-06-25 | Stop reason: HOSPADM

## 2021-06-25 RX ORDER — FENTANYL CITRATE 50 UG/ML
INJECTION, SOLUTION INTRAMUSCULAR; INTRAVENOUS
Status: COMPLETED
Start: 2021-06-25 | End: 2021-06-25

## 2021-06-25 RX ORDER — FENTANYL CITRATE 50 UG/ML
INJECTION, SOLUTION INTRAMUSCULAR; INTRAVENOUS AS NEEDED
Status: DISCONTINUED | OUTPATIENT
Start: 2021-06-25 | End: 2021-06-25 | Stop reason: HOSPADM

## 2021-06-25 RX ORDER — MIDAZOLAM HYDROCHLORIDE 1 MG/ML
1 INJECTION, SOLUTION INTRAMUSCULAR; INTRAVENOUS AS NEEDED
Status: DISCONTINUED | OUTPATIENT
Start: 2021-06-25 | End: 2021-06-25 | Stop reason: HOSPADM

## 2021-06-25 RX ORDER — LIDOCAINE HYDROCHLORIDE 10 MG/ML
0.1 INJECTION, SOLUTION EPIDURAL; INFILTRATION; INTRACAUDAL; PERINEURAL AS NEEDED
Status: DISCONTINUED | OUTPATIENT
Start: 2021-06-25 | End: 2021-06-25 | Stop reason: HOSPADM

## 2021-06-25 RX ORDER — SODIUM CHLORIDE 0.9 % (FLUSH) 0.9 %
5-40 SYRINGE (ML) INJECTION AS NEEDED
Status: DISCONTINUED | OUTPATIENT
Start: 2021-06-25 | End: 2021-06-25 | Stop reason: HOSPADM

## 2021-06-25 RX ORDER — OXYCODONE AND ACETAMINOPHEN 5; 325 MG/1; MG/1
1 TABLET ORAL
Qty: 30 TABLET | Refills: 0 | Status: SHIPPED | OUTPATIENT
Start: 2021-06-25 | End: 2021-07-02

## 2021-06-25 RX ORDER — DIPHENHYDRAMINE HYDROCHLORIDE 50 MG/ML
12.5 INJECTION, SOLUTION INTRAMUSCULAR; INTRAVENOUS AS NEEDED
Status: DISCONTINUED | OUTPATIENT
Start: 2021-06-25 | End: 2021-06-25 | Stop reason: HOSPADM

## 2021-06-25 RX ORDER — SODIUM CHLORIDE, SODIUM LACTATE, POTASSIUM CHLORIDE, CALCIUM CHLORIDE 600; 310; 30; 20 MG/100ML; MG/100ML; MG/100ML; MG/100ML
75 INJECTION, SOLUTION INTRAVENOUS CONTINUOUS
Status: DISCONTINUED | OUTPATIENT
Start: 2021-06-25 | End: 2021-06-25 | Stop reason: HOSPADM

## 2021-06-25 RX ORDER — MORPHINE SULFATE 2 MG/ML
2 INJECTION, SOLUTION INTRAMUSCULAR; INTRAVENOUS
Status: DISCONTINUED | OUTPATIENT
Start: 2021-06-25 | End: 2021-06-25 | Stop reason: HOSPADM

## 2021-06-25 RX ORDER — NEOSTIGMINE METHYLSULFATE 1 MG/ML
INJECTION, SOLUTION INTRAVENOUS AS NEEDED
Status: DISCONTINUED | OUTPATIENT
Start: 2021-06-25 | End: 2021-06-25 | Stop reason: HOSPADM

## 2021-06-25 RX ORDER — IBUPROFEN 800 MG/1
800 TABLET ORAL
Qty: 30 TABLET | Refills: 0 | Status: SHIPPED | OUTPATIENT
Start: 2021-06-25

## 2021-06-25 RX ORDER — PROPOFOL 10 MG/ML
INJECTION, EMULSION INTRAVENOUS AS NEEDED
Status: DISCONTINUED | OUTPATIENT
Start: 2021-06-25 | End: 2021-06-25 | Stop reason: HOSPADM

## 2021-06-25 RX ORDER — ONDANSETRON 2 MG/ML
INJECTION INTRAMUSCULAR; INTRAVENOUS AS NEEDED
Status: DISCONTINUED | OUTPATIENT
Start: 2021-06-25 | End: 2021-06-25 | Stop reason: HOSPADM

## 2021-06-25 RX ORDER — MIDAZOLAM HYDROCHLORIDE 1 MG/ML
INJECTION, SOLUTION INTRAMUSCULAR; INTRAVENOUS AS NEEDED
Status: DISCONTINUED | OUTPATIENT
Start: 2021-06-25 | End: 2021-06-25 | Stop reason: HOSPADM

## 2021-06-25 RX ORDER — SUCCINYLCHOLINE CHLORIDE 20 MG/ML
INJECTION INTRAMUSCULAR; INTRAVENOUS AS NEEDED
Status: DISCONTINUED | OUTPATIENT
Start: 2021-06-25 | End: 2021-06-25 | Stop reason: HOSPADM

## 2021-06-25 RX ADMIN — DEXAMETHASONE SODIUM PHOSPHATE 4 MG: 4 INJECTION, SOLUTION INTRAMUSCULAR; INTRAVENOUS at 12:48

## 2021-06-25 RX ADMIN — WATER 2 G: 1 INJECTION INTRAMUSCULAR; INTRAVENOUS; SUBCUTANEOUS at 12:48

## 2021-06-25 RX ADMIN — FENTANYL CITRATE 25 MCG: 50 INJECTION INTRAMUSCULAR; INTRAVENOUS at 15:17

## 2021-06-25 RX ADMIN — ONDANSETRON HYDROCHLORIDE 4 MG: 2 INJECTION, SOLUTION INTRAMUSCULAR; INTRAVENOUS at 12:48

## 2021-06-25 RX ADMIN — FENTANYL CITRATE 25 MCG: 50 INJECTION, SOLUTION INTRAMUSCULAR; INTRAVENOUS at 13:32

## 2021-06-25 RX ADMIN — NEOSTIGMINE METHYLSULFATE 1.5 MG: 1 INJECTION, SOLUTION INTRAVENOUS at 14:03

## 2021-06-25 RX ADMIN — MIDAZOLAM 2 MG: 1 INJECTION INTRAMUSCULAR; INTRAVENOUS at 12:30

## 2021-06-25 RX ADMIN — PROPOFOL 120 MG: 10 INJECTION, EMULSION INTRAVENOUS at 12:37

## 2021-06-25 RX ADMIN — FENTANYL CITRATE 50 MCG: 50 INJECTION, SOLUTION INTRAMUSCULAR; INTRAVENOUS at 13:01

## 2021-06-25 RX ADMIN — ROCURONIUM BROMIDE 20 MG: 10 SOLUTION INTRAVENOUS at 12:40

## 2021-06-25 RX ADMIN — SUCCINYLCHOLINE CHLORIDE 120 MG: 20 INJECTION, SOLUTION INTRAMUSCULAR; INTRAVENOUS at 12:37

## 2021-06-25 RX ADMIN — SODIUM CHLORIDE, POTASSIUM CHLORIDE, SODIUM LACTATE AND CALCIUM CHLORIDE 75 ML/HR: 600; 310; 30; 20 INJECTION, SOLUTION INTRAVENOUS at 12:21

## 2021-06-25 RX ADMIN — KETOROLAC TROMETHAMINE 15 MG: 60 INJECTION, SOLUTION INTRAMUSCULAR at 14:50

## 2021-06-25 RX ADMIN — PROPOFOL 40 MG: 10 INJECTION, EMULSION INTRAVENOUS at 12:38

## 2021-06-25 RX ADMIN — GLYCOPYRROLATE 0.4 MG: 0.2 INJECTION, SOLUTION INTRAMUSCULAR; INTRAVENOUS at 14:03

## 2021-06-25 RX ADMIN — FENTANYL CITRATE 25 MCG: 50 INJECTION INTRAMUSCULAR; INTRAVENOUS at 15:00

## 2021-06-25 NOTE — DISCHARGE INSTRUCTIONS
Umbilical Hernia Repair    You had ibuprofen at 3 pm. Next dose is at 9 pm      Patient Discharge Instructions    Vincent Guajardo / [id number] : 1950    Admitted 2021 Discharged: 2021       · It is important that you take the medication exactly as they are prescribed. · Keep your medication in the bottles provided by the pharmacist and keep a list of the medication names, dosages, and times to be taken in your wallet. · Do not take other medications without consulting your doctor. · Wound care: May shower at home starting tomorrow, you may remove the dressing on the belly button in 5 days at home, if it falls off that's ok just remove it,  do not remove the dermabond covering the wound. It will fall off on its own in a few weeks. · No heavy lifting or strenuous activity for 2 wks      What to do at Home    See detailed instructions below. Follow-up with Dr. Martinez Gill in 2 week(s). Call the office ([phone]) to schedule your appointment. Information obtained by :  I understand that if any problems occur once I am at home I am to contact my physician. I understand and acknowledge receipt of the instructions indicated above. [de-identified] or R.N.'s Signature                                                                  Date/Time                                                                                                                                              Patient or Representative Signature                                                          Date/Time     Umbilical Hernia Repair: What to Expect at Home  Your Recovery  After surgery to repair your hernia, you are likely to have pain for a few days. You may also feel like you have the flu, and you may have a low fever and feel tired and nauseated. This is common.   You should feel better after a few days and will probably feel much better in 7 days. For several weeks you may feel twinges or pulling in the hernia repair when you move. You may have some bruising around the area of your hernia repair. This is normal.  This care sheet gives you a general idea about how long it will take for you to recover. But each person recovers at a different pace. Follow the steps below to get better as quickly as possible. How can you care for yourself at home? Activity  · Rest when you feel tired. Getting enough sleep will help you recover. Sleep with your head up by using three or four pillows. You can also try to sleep with your head up in a recliner chair. Do not sleep on your side or stomach. · Try to walk each day. Start by walking a little more than you did the day before. Bit by bit, increase the amount you walk. Walking boosts blood flow and helps prevent pneumonia and constipation. · Put ice or a cold pack on the area of your hernia repair for 10 to 20 minutes at a time. Try to do this every 1 to 2 hours for the first 24 hours (when you are awake) or until the swelling goes down. Put a thin cloth between the ice and your skin. · If your doctor gives you an abdominal binder to wear, use it as directed. This is an elastic bandage that wraps around your belly and upper hips. It helps support your belly muscles after surgery. · Avoid strenuous activities, such as biking, jogging, weight lifting, or aerobic exercise, until your doctor says it is okay. · Avoid lifting anything that would make you strain. This may include heavy grocery bags and milk containers, a heavy briefcase or backpack, cat litter or dog food bags, a vacuum , or a child. · Ask your doctor when you can drive again. · Most people are able to return to work within 1 to 2 weeks after surgery. But if your job requires that you to do heavy lifting or strenuous activity, you may need to take 4 to 6 weeks off from work.   · You may shower 24 to 48 hours after surgery, if your doctor okays it. Pat the cut (incision) dry. Do not take a bath for the first 2 weeks, or until your doctor tells you it is okay. · Ask your doctor when it is okay for you to have sex. Diet  · You can eat your normal diet. If your stomach is upset, try bland, low-fat foods like plain rice, broiled chicken, toast, and yogurt. · Drink plenty of fluids (unless your doctor tells you not to). · You may notice that your bowel movements are not regular right after your surgery. This is common. Avoid constipation and straining with bowel movements. You may want to take a fiber supplement every day. If you have not had a bowel movement after a couple of days, ask your doctor about taking a mild laxative. Medicines  · Take pain medicines exactly as directed. ¨ If the doctor gave you a prescription medicine for pain, take it as prescribed. ¨ If you are not taking a prescription pain medicine, ask your doctor if you can take an over-the-counter medicine. ¨ Do not take two or more pain medicines at the same time unless the doctor told you to. Many pain medicines have acetaminophen, which is Tylenol. Too much acetaminophen (Tylenol) can be harmful. · If your doctor prescribed antibiotics, take them as directed. Do not stop taking them just because you feel better. You need to take the full course of antibiotics. · If you think your pain medicine is making you sick to your stomach:  ¨ Take your medicine after meals (unless your doctor has told you not to). ¨ Ask your doctor for a different pain medicine. Incision care  · Wash the area daily with warm, soapy water, and pat it dry. Don't use hydrogen peroxide or alcohol, which may delay healing. You may cover the area with a gauze bandage if it weeps or rubs against clothing. Change the bandage every day. Other instructions  · Hold a pillow over your incision when you cough or take deep breaths.  This will support your belly and decrease your pain. · Do breathing exercises at home as instructed by your doctor. This will help prevent pneumonia. · If you had laparoscopic surgery, you may also have pain in your left shoulder. The pain usually lasts about a day or two. Follow-up care is a key part of your treatment and safety. Be sure to make and go to all appointments, and call your doctor if you are having problems. It's also a good idea to know your test results and keep a list of the medicines you take. When should you call for help? Call 911 anytime you think you may need emergency care. For example, call if:  · You passed out (lost consciousness). · You have sudden chest pain and shortness of breath, or you cough up blood. · You have severe pain in your belly. Call your doctor now or seek immediate medical care if:  · You are sick to your stomach and cannot keep fluids down. · You have signs of a blood clot, such as:  ¨ Pain in your calf, back of the knee, thigh, or groin. ¨ Redness and swelling in your leg or groin. · You have signs of infection, such as:  ¨ Increased pain, swelling, warmth, or redness. ¨ Red streaks leading from the incision. ¨ Pus draining from the incision. ¨ Swollen lymph nodes in the groin. ¨ A fever. · You have trouble passing urine or stool, especially if you have mild pain or swelling in your lower belly. · Bright red blood has soaked through the bandage over your incision. Watch closely for changes in your health, and be sure to contact your doctor if:  · Your swelling is getting worse. · Your swelling is not going down. · You still don't have a bowel movement after taking a laxative. Where can you learn more? Go to [employer]  Enter B577 in the search box to learn more about \"Abdominal Hernia Repair: What to Expect at Home. \"   © 5994-9491 [employer].  Care instructions adapted under license by [facility] (which disclaims liability or warranty for this information). This care instruction is for use with your licensed healthcare professional. If you have questions about a medical condition or this instruction, always ask your healthcare professional. Khalif Pelayo any warranty or liability for your use of this information. Content Version: 8.8.60430; Last Revised: August 24, 2010    ______________________________________________________________________    Anesthesia Discharge Instructions    After general anesthesia or intervenous sedation, for 24 hours or while taking prescription Narcotics:  · Limit your activities  · Do not drive or operate hazardous machinery  · If you have not urinated within 8 hours after discharge, please contact your surgeon on call. · Do not make important personal or business decisions  · Do not drink alcoholic beverages    Report the following to your surgeon:  · Excessive pain, swelling, redness or odor of or around the surgical area  · Temperature over 100.5 degrees  · Nausea and vomiting lasting longer than 4 hours or if unable to take medication  · Any signs of decreased circulation or nerve impairment to extremity:  Change in color, persistent numbness, tingling, coldness or increased pain.   · Any questions      \

## 2021-06-25 NOTE — ANESTHESIA POSTPROCEDURE EVALUATION
Procedure(s):  OPEN RECURRENT UMBILICAL HERNIA REPAIR WITH POSSIBLE MESH. general    Anesthesia Post Evaluation      Multimodal analgesia: multimodal analgesia used between 6 hours prior to anesthesia start to PACU discharge  Patient location during evaluation: PACU  Patient participation: complete - patient participated  Level of consciousness: awake and alert  Pain management: adequate  Airway patency: patent  Anesthetic complications: no  Cardiovascular status: acceptable  Respiratory status: acceptable  Hydration status: acceptable  Comments: Seen, no complaints   Post anesthesia nausea and vomiting:  none  Final Post Anesthesia Temperature Assessment:  Normothermia (36.0-37.5 degrees C)      INITIAL Post-op Vital signs:   Vitals Value Taken Time   /67 06/25/21 1515   Temp 36.4 °C (97.5 °F) 06/25/21 1428   Pulse 63 06/25/21 1525   Resp 14 06/25/21 1525   SpO2 96 % 06/25/21 1525   Vitals shown include unvalidated device data.

## 2021-06-25 NOTE — OP NOTES
[address]   OPERATIVE REPORT    Name:  Skyler Frank  MR#:  [id number]  :  1950  ACCOUNT #:  [de-identified]  DATE OF SERVICE:  2021    PREOPERATIVE DIAGNOSIS:  Recurrent umbilical hernia. POSTOPERATIVE DIAGNOSIS:  Recurrent umbilical hernia. PROCEDURE PERFORMED:  Open recurrent umbilical hernia repair with mesh. SURGEON:   Sarwat Daniels MD    ASSISTANT:  Vincent Hurley SA    ANESTHESIA:  General.    COMPLICATIONS:  None. SPECIMENS REMOVED:  None. IMPLANTS:  A 6 cm round Ventralight ST mesh. ESTIMATED BLOOD LOSS:  Minimal.    DRAINS:  None. FINDINGS:  2 cm recurrent umbilical hernia repair with a 6 cm round Ventralight ST mesh, IPOM repair with mesh and primary closure. INDICATIONS FOR THE OPERATION:  The patient has a recurrent umbilical hernia which was repaired primarily with absorbable suture at a previous surgery for cholecystectomy and is needing open repair with possible mesh in the operating room. DESCRIPTION OF THE OPERATION:  The patient was met in the preop holding area, H and P was updated, consent was signed. All risks and benefits were explained to the patient prior to the start of the operation. She was taken back to the operating room. She was lying in the supine position. The abdomen was prepped and draped in standard sterile fashion. Time-out was called, antibiotics were given, SCDs were on lower extremities. Started the operation by making an infraumbilical curvilinear incision, dissecting around the subcutaneous tissue and encompassing the entire umbilical stalk. We lifted the umbilical stalk off the infraumbilical fascia with the Bovie cautery, exposing her hernia defect. There was a bit of hernia sac. We removed the hernia sac from the fascial edges and exposed the fascial edges. The fascial edges defect measured about 2 cm in size. We knew that this would need mesh for repair.   I tried to see if we could do a preperitoneal approach, but the peritoneum was very difficult to work with and we could not do a preperitoneal approach, so we would do an intraperitoneal onlay mesh repair. I took a 6-cm round Ventralight ST mesh and then I placed it into the intra-abdominal cavity, and I then sutured it to the anterior abdominal wall superiorly, inferiorly and right and left lateral portions on the outer edges of the mesh to make sure that the mesh was adherent to the fascia. I did this about 3 cm away from the opening of the hole on all 4 of the sides to make sure that the mesh would not crinkle up with the fascial closure. The sutures were placed. I then placed 4 more sutures in between the other sutures for a total of 8 transfascial fixation sutures to the abdominal wall to make sure the mesh did not displace. Once that was done, we then irrigated the wound, and everything was hemostatic. We then closed the fascial defect with multiple interrupted 0 Ethibond sutures in a figure-of-eight fashion using about 6 sutures for the fascial closure. The fascia was closed. Everything looked good with the repair. We irrigated the wound with saline irrigation, and then we tacked down the umbilicus back down to the infraumbilical fascia with the 2-0 Vicryl suture. We then closed the deep dermal layer of the incision with multiple interrupted 3-0 Vicryl sutures and a 4-0 Monocryl and Dermabond to close the wound. We then placed a pressure dressing with a 4 x 4 and a Tegaderm and suctioned out the air from the bellybutton to make sure there was pressure at the umbilicus. We then completed the operation. Dr. Chaz Soliman was present and scrubbed during the entire operation. The counts were correct.       Genie Holder MD      [id number]  D:  06/25/2021 14:33  T:  06/25/2021 19:38  JOB #:  [id number] Quality 110: Preventive Care And Screening: Influenza Immunization: Influenza Immunization Administered during Influenza season Quality 130: Documentation Of Current Medications In The Medical Record: Current Medications Documented Detail Level: Detailed Quality 111:Pneumonia Vaccination Status For Older Adults: Pneumococcal Vaccination Previously Received Quality 131: Pain Assessment And Follow-Up: Pain assessment using a standardized tool is documented as negative, no follow-up plan required

## 2021-06-25 NOTE — BRIEF OP NOTE
Brief Postoperative Note    Patient: Nona Leventhal  YOB: 1950  MRN: [id number]    Date of Procedure: 6/25/2021     Pre-Op Diagnosis: RECURRENT UMBILICAL HERNIA    Post-Op Diagnosis: Same as preoperative diagnosis. Procedure(s):  OPEN RECURRENT UMBILICAL HERNIA REPAIR WITH MESH    Surgeon(s):  Yamilet Velasquez MD    Surgical Assistant: Surg Asst-1: Pito Kim    Anesthesia: General     Estimated Blood Loss (mL): Minimal    Complications: None    Specimens: * No specimens in log *     Implants:   Implant Name Type Inv.  Item Serial No.  Lot No. LRB No. Used Action   MESH VENTRALEX ST MED --  - SNA  MESH VENTRALEX ST MED --  NA BARD DAVOL_WD [id number] N/A 1 Implanted       Drains: * No LDAs found *    Findings: 2cm recurrent umbilical hernia repaired with 6cm round Ventralyte ST mesh IPOM with primary closure    Electronically Signed by Callum Smith MD on 6/25/2021 at 2:19 PM

## 2021-06-25 NOTE — INTERVAL H&P NOTE
Update History & Physical      The surgery was reviewed with the patient and I examined the patient. There was no change. The surgical site was confirmed by the patient and me. Plan:  The risk, benefits, expected outcome, and alternative to the recommended procedure have been discussed with the patient. Patient understands and wants to proceed with the procedure.     Electronically signed by Evelyne Mcgarry MD on 6/25/2021 at 12:07 PM

## 2021-06-25 NOTE — ANESTHESIA PREPROCEDURE EVALUATION
Relevant Problems   CARDIOVASCULAR   (+) Essential hypertension      ENDOCRINE   (+) Acquired hypothyroidism   (+) Severe obesity (HCC)       Anesthetic History   No history of anesthetic complications            Review of Systems / Medical History  Patient summary reviewed, nursing notes reviewed and pertinent labs reviewed    Pulmonary  Within defined limits                 Neuro/Psych             Comments: Chronic pain Cardiovascular    Hypertension              Exercise tolerance: >4 METS     GI/Hepatic/Renal  Within defined limits              Endo/Other    Diabetes  Hypothyroidism  Obesity, arthritis and cancer     Other Findings              Physical Exam    Airway  Mallampati: II  TM Distance: 4 - 6 cm  Neck ROM: normal range of motion   Mouth opening: Normal     Cardiovascular    Rhythm: regular  Rate: normal         Dental  No notable dental hx       Pulmonary  Breath sounds clear to auscultation               Abdominal  GI exam deferred       Other Findings            Anesthetic Plan    ASA: 2  Anesthesia type: general          Induction: Intravenous  Anesthetic plan and risks discussed with: Patient

## 2021-06-28 ENCOUNTER — TELEPHONE (OUTPATIENT)
Dept: INTERNAL MEDICINE CLINIC | Age: 71
End: 2021-06-28

## 2021-06-28 NOTE — TELEPHONE ENCOUNTER
Attempted to contact patient, unsuccessful.    -Left message advising okay to wait until f/up in July per Dr. Chuckie Lopez.

## 2021-06-28 NOTE — TELEPHONE ENCOUNTER
[phone]      The patient would like to see if she can do her out patient F/U on 7/19 or does she need to be seen sooner

## 2021-07-07 ENCOUNTER — VIRTUAL VISIT (OUTPATIENT)
Dept: SURGERY | Age: 71
End: 2021-07-07
Payer: MEDICARE

## 2021-07-07 DIAGNOSIS — Z98.890 S/P HERNIA REPAIR: ICD-10-CM

## 2021-07-07 DIAGNOSIS — Z87.19 S/P HERNIA REPAIR: ICD-10-CM

## 2021-07-07 DIAGNOSIS — Z09 POSTOPERATIVE EXAMINATION: Primary | ICD-10-CM

## 2021-07-07 PROCEDURE — 99024 POSTOP FOLLOW-UP VISIT: CPT | Performed by: NURSE PRACTITIONER

## 2021-07-07 NOTE — PROGRESS NOTES
I was at home while conducting this encounter. Consent:  She and/or her healthcare decision maker is aware that this patient-initiated Telehealth encounter is a billable service, with coverage as determined by her insurance carrier. She is aware that she may receive a bill and has provided verbal consent to proceed: No - Not billable    This virtual visit was conducted via [employer]. Pursuant to the emergency declaration under the [facility] waiver authority and the [employer] and [employer] Act, this Virtual  Visit was conducted to reduce the patient's risk of exposure to COVID-19 and provide continuity of care for an established patient. Services were provided through a video synchronous discussion virtually to substitute for in-person clinic visit. Due to this being a TeleHealth evaluation, many elements of the physical examination are unable to be assessed. Total Time: minutes: 11-20 minutes. Subjective:    Sae Sams is a 80 y.o. female presents for postop care 12 days following recurrent umbilical hernia repair by Dr. Aaron Calvillo. Appetite is good. Eating a regular diet  without difficulty. Bowel movements are  regular. Pain is controlled without any medications. . Denies fever, nausea, shortness of breath, chest pain, redness at incision site, vomiting and diarrhea    Objective:       General:  alert, no distress   Abdomen: soft, non-tender   Incision:   healing well, no drainage, no erythema, no seroma, no swelling, no dehiscence, incisions well approximated   Heart: deferred   Lungs: unlabored       Assessment:     Recurrent umbilical hernia status post repair. Doing well postoperatively. Plan:     1. Pt is to increase activities as tolerated. May lift 15 lbs x 2 weeks, then 30 lbs x 2 weeks and increase as tolerated there after. No biking until 6 weeks post op.    2. Follow-up prn.  3. Wound care discussed    Ms. Rosario George has a reminder for a \"due or due soon\" health maintenance. I have asked that she contact her primary care provider for follow-up on this health maintenance. Patient verbalized understanding and agreement.

## 2021-07-07 NOTE — PATIENT INSTRUCTIONS
Abdominal Hernia Repair: What to Expect at Home  Your Recovery  After surgery to repair your hernia, you are likely to have pain for a few days. You may also feel tired and have less energy than normal. This is common. You should feel better after a few days and will probably feel much better in 7 days. For several weeks you may feel discomfort or pulling in the hernia repair when you move. You may have some bruising around the area of the repair. This is normal.  This care sheet gives you a general idea about how long it will take for you to recover. But each person recovers at a different pace. Follow the steps below to get better as quickly as possible. How can you care for yourself at home? Activity    · Rest when you feel tired. Getting enough sleep will help you recover.     · Try to walk each day. Start by walking a little more than you did the day before. Bit by bit, increase the amount you walk. Walking boosts blood flow and helps prevent pneumonia and constipation.     · If your doctor gives you an abdominal binder to wear, use it as directed. This is an elastic bandage that wraps around your belly and upper hips. It helps support your belly muscles after surgery.     · Avoid strenuous activities, such as biking, jogging, weight lifting, or aerobic exercise, until your doctor says it is okay.     · Avoid lifting anything that would make you strain. This may include heavy grocery bags and milk containers, a heavy briefcase or backpack, cat litter or dog food bags, a vacuum , or a child.     · Ask your doctor when you can drive again.     · Most people are able to return to work within 1 to 2 weeks after surgery. But if your job requires that you do heavy lifting or strenuous activity, you may need to take 4 to 6 weeks off from work.     · You may shower 24 to 48 hours after surgery, if your doctor okays it. Pat the cut (incision) dry.  Do not take a bath for the first 2 weeks, or until your doctor tells you it is okay.     · Ask your doctor when it is okay for you to have sex. Diet    · You can eat your normal diet. If your stomach is upset, try bland, low-fat foods like plain rice, broiled chicken, toast, and yogurt.     · Drink plenty of fluids (unless your doctor tells you not to).     · You may notice that your bowel movements are not regular right after your surgery. This is common. Avoid constipation and straining with bowel movements. You may want to take a fiber supplement every day. If you have not had a bowel movement after a couple of days, ask your doctor about taking a mild laxative. Medicines    · Your doctor will tell you if and when you can restart your medicines. You will also be given instructions about taking any new medicines.     · If you take aspirin or some other blood thinner, ask your doctor if and when to start taking it again. Make sure that you understand exactly what your doctor wants you to do.     · Be safe with medicines. Take pain medicines exactly as directed. ? If the doctor gave you a prescription medicine for pain, take it as prescribed. ? If you are not taking a prescription pain medicine, ask your doctor if you can take an over-the-counter medicine.     · If your doctor prescribed antibiotics, take them as directed. Do not stop taking them just because you feel better. You need to take the full course of antibiotics.     · If you think your pain medicine is making you sick to your stomach:  ? Take your medicine after meals (unless your doctor has told you not to). ? Ask your doctor for a different pain medicine. Incision care    · If you have strips of tape on the cut (incision) the doctor made, leave the tape on for a week or until it falls off.  Or follow your doctor's instructions for removing the tape.     · If you have staples closing the cut, you will need to visit your doctor in 1 to 2 weeks to have them removed.     · Wash the area daily with warm, soapy water, and pat it dry. Don't use hydrogen peroxide or alcohol, which can slow healing. You may cover the area with a gauze bandage if it weeps or rubs against clothing. Change the bandage every day. Other instructions    · Hold a pillow over your incision when you cough or take deep breaths. This will support your belly and decrease your pain.     · Do breathing exercises at home as instructed by your doctor. This will help prevent pneumonia.     · If you had laparoscopic surgery, you may also have pain in your shoulder. The pain usually lasts about a day or two. Follow-up care is a key part of your treatment and safety. Be sure to make and go to all appointments, and call your doctor if you are having problems. It's also a good idea to know your test results and keep a list of the medicines you take. When should you call for help? Call 911 anytime you think you may need emergency care. For example, call if:    · You passed out (lost consciousness).     · You are short of breath. Call your doctor now or seek immediate medical care if:    · You are sick to your stomach and cannot drink fluids.     · You have signs of a blood clot in your leg (called a deep vein thrombosis), such as:  ? Pain in your calf, back of the knee, thigh, or groin. ? Redness and swelling in your leg or groin.     · You have signs of infection, such as:  ? Increased pain, swelling, warmth, or redness. ? Red streaks leading from the incision. ? Pus draining from the incision. ? A fever.     · You cannot pass stools or gas.     · You have pain that does not get better after you take pain medicine.     · You have loose stitches, or your incision comes open.     · Bright red blood has soaked through the bandage over your incision. Watch closely for changes in your health, and be sure to contact your doctor if you have any problems. Where can you learn more?   Go to http://www.gray.com/  Enter B577 in the search box to learn more about \"Abdominal Hernia Repair: What to Expect at Home. \"  Current as of: April 15, 2020               Content Version: 12.8  © 2006-2021 Healthwise, "[employer]. Care instructions adapted under license by [employer] (which disclaims liability or warranty for this information). If you have questions about a medical condition or this instruction, always ask your healthcare professional. [place] [address] any warranty or liability for your use of this information.

## 2021-07-07 NOTE — PROGRESS NOTES
1. Have you been to the ER, urgent care clinic since your last visit? Hospitalized since your last visit? no    2. Have you seen or consulted any other health care providers outside of the [address] [place] [address] since your last visit? Include any pap smears or colon screening.  no

## 2021-07-19 ENCOUNTER — OFFICE VISIT (OUTPATIENT)
Dept: INTERNAL MEDICINE CLINIC | Age: 71
End: 2021-07-19
Payer: MEDICARE

## 2021-07-19 VITALS
HEART RATE: 67 BPM | SYSTOLIC BLOOD PRESSURE: 136 MMHG | DIASTOLIC BLOOD PRESSURE: 78 MMHG | RESPIRATION RATE: 16 BRPM | HEIGHT: 61 IN | TEMPERATURE: 97.8 F | OXYGEN SATURATION: 95 % | WEIGHT: 185.2 LBS | BODY MASS INDEX: 34.97 KG/M2

## 2021-07-19 DIAGNOSIS — E66.01 SEVERE OBESITY (HCC): ICD-10-CM

## 2021-07-19 DIAGNOSIS — Z78.0 MENOPAUSE: ICD-10-CM

## 2021-07-19 DIAGNOSIS — E03.9 ACQUIRED HYPOTHYROIDISM: ICD-10-CM

## 2021-07-19 DIAGNOSIS — Z00.00 MEDICARE ANNUAL WELLNESS VISIT, SUBSEQUENT: Primary | ICD-10-CM

## 2021-07-19 DIAGNOSIS — G57.93 NEUROPATHIC PAIN OF BOTH FEET: ICD-10-CM

## 2021-07-19 DIAGNOSIS — G89.29 CHRONIC BILATERAL LOW BACK PAIN, UNSPECIFIED WHETHER SCIATICA PRESENT: ICD-10-CM

## 2021-07-19 DIAGNOSIS — M54.50 CHRONIC BILATERAL LOW BACK PAIN, UNSPECIFIED WHETHER SCIATICA PRESENT: ICD-10-CM

## 2021-07-19 DIAGNOSIS — I10 ESSENTIAL HYPERTENSION: ICD-10-CM

## 2021-07-19 DIAGNOSIS — E55.9 VITAMIN D DEFICIENCY: ICD-10-CM

## 2021-07-19 DIAGNOSIS — E78.2 MIXED HYPERLIPIDEMIA: ICD-10-CM

## 2021-07-19 PROCEDURE — 1101F PT FALLS ASSESS-DOCD LE1/YR: CPT | Performed by: INTERNAL MEDICINE

## 2021-07-19 PROCEDURE — 3017F COLORECTAL CA SCREEN DOC REV: CPT | Performed by: INTERNAL MEDICINE

## 2021-07-19 PROCEDURE — G9899 SCRN MAM PERF RSLTS DOC: HCPCS | Performed by: INTERNAL MEDICINE

## 2021-07-19 PROCEDURE — G8752 SYS BP LESS 140: HCPCS | Performed by: INTERNAL MEDICINE

## 2021-07-19 PROCEDURE — G8536 NO DOC ELDER MAL SCRN: HCPCS | Performed by: INTERNAL MEDICINE

## 2021-07-19 PROCEDURE — G8399 PT W/DXA RESULTS DOCUMENT: HCPCS | Performed by: INTERNAL MEDICINE

## 2021-07-19 PROCEDURE — 1090F PRES/ABSN URINE INCON ASSESS: CPT | Performed by: INTERNAL MEDICINE

## 2021-07-19 PROCEDURE — 99214 OFFICE O/P EST MOD 30 MIN: CPT | Performed by: INTERNAL MEDICINE

## 2021-07-19 PROCEDURE — G0463 HOSPITAL OUTPT CLINIC VISIT: HCPCS | Performed by: INTERNAL MEDICINE

## 2021-07-19 PROCEDURE — G8427 DOCREV CUR MEDS BY ELIG CLIN: HCPCS | Performed by: INTERNAL MEDICINE

## 2021-07-19 PROCEDURE — G8754 DIAS BP LESS 90: HCPCS | Performed by: INTERNAL MEDICINE

## 2021-07-19 PROCEDURE — G8417 CALC BMI ABV UP PARAM F/U: HCPCS | Performed by: INTERNAL MEDICINE

## 2021-07-19 PROCEDURE — G8510 SCR DEP NEG, NO PLAN REQD: HCPCS | Performed by: INTERNAL MEDICINE

## 2021-07-19 PROCEDURE — G0439 PPPS, SUBSEQ VISIT: HCPCS | Performed by: INTERNAL MEDICINE

## 2021-07-19 RX ORDER — ROPINIROLE 1 MG/1
TABLET, FILM COATED ORAL
Qty: 60 TABLET | Refills: 0 | Status: SHIPPED | OUTPATIENT
Start: 2021-07-19 | End: 2021-08-20

## 2021-07-19 RX ORDER — HYDROCODONE BITARTRATE AND ACETAMINOPHEN 5; 325 MG/1; MG/1
1 TABLET ORAL
COMMUNITY
Start: 2021-03-26 | End: 2022-07-06 | Stop reason: ALTCHOICE

## 2021-07-19 NOTE — PROGRESS NOTES
Chief Complaint   Patient presents with   [facility] Annual Wellness Visit       1. Have you been to the ER, urgent care clinic since your last visit? Hospitalized since your last visit? No    2. Have you seen or consulted any other health care providers outside of the [address] [place] [address] since your last visit? Include any pap smears or colon screening.  No

## 2021-07-19 NOTE — PROGRESS NOTES
This is the Subsequent Medicare Annual Wellness Exam, performed 12 months or more after the Initial AWV or the last Subsequent AWV    I have reviewed the patient's medical history in detail and updated the computerized patient record. Also for follow-up of her health issues. Recently she underwent hernia surgery and is healing reasonably well. She does feel that there is a slight prominence of one of the areas of suture. She has had some soreness along the inferior aspect of the incision. Her ankle and foot pain are improving with physical therapy. She does continue to have some bloating and discomfort. Her most recent lab work from the hospital revealed an A1c of 6%. Other labs were normal.    ROS - Per HPI    Physical Examination: General appearance - alert, well appearing, and in no distress  Ears - bilateral TM's and external ear canals normal  Nose - normal and patent, no erythema, discharge or polyps  Mouth - mucous membranes moist, pharynx normal without lesions  Neck - supple, no significant adenopathy  Lymphatics - no palpable lymphadenopathy, no hepatosplenomegaly  Chest - clear to auscultation, no wheezes, rales or rhonchi, symmetric air entry  Heart - normal rate and regular rhythm  Abdomen - soft, nontender, nondistended, no masses or organomegaly  Neurological - alert, oriented, normal speech, no focal findings or movement disorder noted  Musculoskeletal - no joint tenderness, deformity or swelling  Extremities - peripheral pulses normal, no pedal edema, no clubbing or cyanosis  Skin -lesion just below the umbilicus with a small open area in the center. Does have a small scabbed area on the right lateral aspect of the incision with a? Minute suture and that is present.         Assessment/Plan   Education and counseling provided:  Are appropriate based on today's review and evaluation  End-of-Life planning (with patient's consent)  Influenza Vaccine  Screening Mammography  Bone mass measurement (DEXA)    1. Medicare annual wellness visit, subsequent  -     DEXA BONE DENSITY STUDY AXIAL; Future  2. Acquired hypothyroidism -appears euthyroid. Will check levels and adjust medications as needed. -     TSH 3RD GENERATION; Future  -     T4, FREE; Future  3. Essential hypertensionblood pressure well controlled on current meds and will continue these. 4. Chronic bilateral low back pain, unspecified whether sciatica presentstable. 5. Mixed hyperlipidemiaLDL goal of 100. Repeat labs to determine need for adjustment in medications.  -     LIPID PANEL; Future  -     HEPATIC FUNCTION PANEL; Future  6. Neuropathic pain of both feetstable. 7. Severe obesity (HCC)again discussed prediabetes diagnosis with her and she understands that that is where she is. We will continue Metformin and work on weight loss. 8. Menopauserepeat bone density now. -     DEXA BONE DENSITY STUDY AXIAL; Future  9. Vitamin D deficiencyrepeat vitamin D level to be sure that is increased. -     VITAMIN D, 25 HYDROXY; Future  -     DEXA BONE DENSITY STUDY AXIAL; Future       Depression Risk Factor Screening     3 most recent PHQ Screens 7/19/2021   Little interest or pleasure in doing things Not at all   Feeling down, depressed, irritable, or hopeless Not at all   Total Score PHQ 2 0       Alcohol Risk Screen   Do you average more than 1 drink per night or more than 7 drinks a week?: (P) No  On any one occasion in the past three months have you had more than 3 drinks containing alcohol?: (P) No          Functional Ability and Level of Safety   Hearing:  Hearing: (P) Patient reports hearing is good      Activities of Daily Living:   The home contains: (P) handrails, rugs  Functional ADLs: (P) Patient does total self care     Ambulation:  Patient ambulates: (P) with mild difficulty  How far the patient can walk with difficulty: (P) 8 - 10,000 steps -- then need to rest my ankle  Walking is difficult due to: (P) pain     Fall Risk:  Fall Risk Assessment, last 12 mths 7/19/2021   Able to walk? Yes   Fall in past 12 months? 0   Do you feel unsteady? 0   Are you worried about falling 0   Number of falls in past 12 months -   Fall with injury? -     Abuse Screen:  Do you ever feel afraid of your partner?: No  Are you in a relationship with someone who physically or mentally threatens you?: No  Is it safe for you to go home?: Yes        Cognitive Screening   Has your family/caregiver stated any concerns about your memory?: (P) No         Health Maintenance Due   There are no preventive care reminders to display for this patient.     Patient Care Team   Patient Care Team:  Priscilla Taylor MD as PCP - General  Priscilla Taylor MD as PCP - [facility] Empaneled Provider  Alden Maguire MD (Gastroenterology)  Azra Reddy DPM (Podiatry)  James Churchill MD (Neurology)  Manda Marion MD (Neurology)  Fili Alfaro MD (Ophthalmology)  Laurie Rueda MD (Neurology)  Isaak Pacheco NP (General Surgery)    History     Patient Active Problem List   Diagnosis Code    Back pain M54.9    Essential hypertension I10    Acquired hypothyroidism E03.9    Advance directive discussed with patient Z70.80    Mixed hyperlipidemia E78.2    Neuropathic pain of both feet G57.93    Severe obesity ([address] E66.01     Past Medical History:   Diagnosis Date    Arthritis     Cancer ([address]     skin BASAL ON NOSE    Chronic pain     back - PT    COVID-19 vaccine series completed     MODERNA 01/27/21 & 02/24/21    Diabetes ([address]     PRE DIABETIC    Hypercholesterolemia     Hypertension     Ill-defined condition     N/V    Ill-defined condition     right eye scheduled for cataract removed 12/13/2017    Thyroid disease       Past Surgical History:   Procedure Laterality Date    COLONOSCOPY N/A 2/17/2021    COLONOSCOPY   :- performed by Luis Daniel Mejia MD at [address] [place] [address] Bilateral 2018    [address]    HX COLONOSCOPY  1/29/16    Dr. Alma Delia Savage HX HEENT Bilateral 02/2020    EYE LIFT    HX HEENT  01/2020    eyelid surgery     HX HEENT  2020    oral sx     HX HERNIA REPAIR  06/25/2021    open recurrent umbilical hernia repair with mesh by Dr. Najma Field at [address] [place], [place] [address] HX LAP CHOLECYSTECTOMY  11/20/2017    Momin    HX MALIGNANT SKIN LESION EXCISION  2012    skin cancer from nose - BCCA per pt    HX MOHS PROCEDURES      HX ORTHOPAEDIC Left 11/11/2019    Ankle srugery    HX ORTHOPAEDIC Right 03/22/2021    Right ankle achilles bone spur repair.  HX OTHER SURGICAL  2020    ORAL SURGERY    HX SHOULDER REPLACEMENT Right 06/04/2020    Dr. Jonatan Carey  1995,3/2007, 11/13    breast biopsy - all benign     Current Outpatient Medications   Medication Sig Dispense Refill    HYDROcodone-acetaminophen (NORCO) 5-325 mg per tablet Take 1 Tablet by mouth every six (6) hours as needed.  ibuprofen (MOTRIN) 800 mg tablet Take 1 Tablet by mouth every six (6) hours as needed for Pain. 30 Tablet 0    rOPINIRole (REQUIP) 1 mg tablet Take 1 tablet by mouth twice daily (Patient taking differently: Take 2 mg by mouth nightly.) 60 Tablet 0    conjugated estrogens (Premarin) 0.625 mg tablet Take 1 tablet by mouth once daily 90 Tablet 0    triamcinolone acetonide (KENALOG) 0.1 % topical cream Apply  to affected area two (2) times a day. use thin layer 45 g 1    cloNIDine HCL (CATAPRES) 0.2 mg tablet Take 1 Tablet by mouth two (2) times a day. 180 Tablet 1    metFORMIN ER (GLUCOPHAGE XR) 500 mg tablet TAKE 1 TABLET BY MOUTH ONCE DAILY WITH SUPPER 90 Tablet 0    fenofibrate nanocrystallized (TRICOR) 145 mg tablet Take 1 tablet by mouth once daily 90 Tab 0    levothyroxine (SYNTHROID) 112 mcg tablet TAKE 1 TABLET BY MOUTH BEFORE BREAKFAST.  APPOINTMENT REQUIRED FOR FUTURE REFILLS 90 Tab 0    chlorthalidone (HYGROTEN) 50 mg tablet Take 1 tablet by mouth once daily 90 Tab 0    gabapentin (NEURONTIN) 100 mg capsule TAKE 1 CAPSULE BY MOUTH THREE TIMES DAILY. **MAX DAILY AMOUNT: 300 MG** 90 Cap 0    Cholecalciferol, Vitamin D3, (VITAMIN D3) 2,000 unit cap capsule Take 2,000 Units by mouth daily. 90 Cap 0    vitamin E (AQUA GEMS) 400 unit capsule Take 400 Units by mouth daily.  GLUCOSAMINE/CHONDROITIN SULF A (GLUCOSAMINE-CHONDROITIN PO) Take  by mouth. 1500mg/1200mg per 2 pills. Takes one pill once daily.  loratadine (ALLERCLEAR) 10 mg tablet Take 10 mg by mouth daily as needed for Allergies.        Allergies   Allergen Reactions    Codeine Other (comments)     Hallucination/room spinning    Sudafed [Pseudoephedrine Hcl] Palpitations    [de-identified] A500 [Propoxyphene N-Acetaminophen] Nausea and Vomiting       Family History   Problem Relation Age of Onset    Heart Disease Father         CAD, CABG    Diabetes Father         insulin    Hypertension Mother    Fracisco Kaur Cancer Mother         nose - skin - BCCA    Hypertension Brother     Other Brother         HEART MURMUR    Hypertension Brother     Other Brother         PVD    Kidney Disease Brother         dialysis    Diabetes Brother         insulin    Anesth Problems Neg Hx      Social History     Tobacco Use    Smoking status: Never Smoker    Smokeless tobacco: Never Used   Substance Use Topics    Alcohol use: Not Currently     Comment: maybe once a year - has a sip         Ele Mares MD

## 2021-07-19 NOTE — PATIENT INSTRUCTIONS
Medicare Wellness Visit, Female     The best way to live healthy is to have a lifestyle where you eat a well-balanced diet, exercise regularly, limit alcohol use, and quit all forms of tobacco/nicotine, if applicable. Regular preventive services are another way to keep healthy. Preventive services (vaccines, screening tests, monitoring & exams) can help personalize your care plan, which helps you manage your own care. Screening tests can find health problems at the earliest stages, when they are easiest to treat. [facility] follows the current, evidence-based guidelines published by the [facility] ([facility]) when recommending preventive services for our patients. Because we follow these guidelines, sometimes recommendations change over time as research supports it. (For example, mammograms used to be recommended annually. Even though Medicare will still pay for an annual mammogram, the newer guidelines recommend a mammogram every two years for women of average risk). Of course, you and your doctor may decide to screen more often for some diseases, based on your risk and your co-morbidities (chronic disease you are already diagnosed with). Preventive services for you include:  - Medicare offers their members a free annual wellness visit, which is time for you and your primary care provider to discuss and plan for your preventive service needs. Take advantage of this benefit every year!  -All adults over the age of 72 should receive the recommended pneumonia vaccines. Current USPSTF guidelines recommend a series of two vaccines for the best pneumonia protection.   -All adults should have a flu vaccine yearly and a tetanus vaccine every 10 years.   -All adults age 48 and older should receive the shingles vaccines (series of two vaccines).       -All adults age 38-68 who are overweight should have a diabetes screening test once every three years.   -All adults born between [birth year] and 1964 should be screened once for Hepatitis C.  -Other screening tests and preventive services for persons with diabetes include: an eye exam to screen for diabetic retinopathy, a kidney function test, a foot exam, and stricter control over your cholesterol.   -Cardiovascular screening for adults with routine risk involves an electrocardiogram (ECG) at intervals determined by your doctor.   -Colorectal cancer screenings should be done for adults age 54-65 with no increased risk factors for colorectal cancer. There are a number of acceptable methods of screening for this type of cancer. Each test has its own benefits and drawbacks. Discuss with your doctor what is most appropriate for you during your annual wellness visit. The different tests include: colonoscopy (considered the best screening method), a fecal occult blood test, a fecal DNA test, and sigmoidoscopy.    -A bone mass density test is recommended when a woman turns 65 to screen for osteoporosis. This test is only recommended one time, as a screening. Some providers will use this same test as a disease monitoring tool if you already have osteoporosis. -Breast cancer screenings are recommended every other year for women of normal risk, age 54-69.  -Cervical cancer screenings for women over age 72 are only recommended with certain risk factors. Here is a list of your current Health Maintenance items (your personalized list of preventive services) with a due date: There are no preventive care reminders to display for this patient.

## 2021-07-26 ENCOUNTER — HOSPITAL ENCOUNTER (OUTPATIENT)
Dept: MAMMOGRAPHY | Age: 71
Discharge: HOME | End: 2021-07-26
Attending: INTERNAL MEDICINE
Payer: MEDICARE

## 2021-07-26 DIAGNOSIS — Z78.0 MENOPAUSE: ICD-10-CM

## 2021-07-26 DIAGNOSIS — E55.9 VITAMIN D DEFICIENCY: ICD-10-CM

## 2021-07-26 DIAGNOSIS — Z00.00 MEDICARE ANNUAL WELLNESS VISIT, SUBSEQUENT: ICD-10-CM

## 2021-07-26 PROCEDURE — 77080 DXA BONE DENSITY AXIAL: CPT

## 2021-07-28 DIAGNOSIS — M54.14 THORACIC RADICULOPATHY: ICD-10-CM

## 2021-07-28 RX ORDER — GABAPENTIN 100 MG/1
CAPSULE ORAL
Qty: 90 CAPSULE | Refills: 0 | Status: SHIPPED | OUTPATIENT
Start: 2021-07-28 | End: 2021-09-20

## 2021-08-20 RX ORDER — ROPINIROLE 1 MG/1
TABLET, FILM COATED ORAL
Qty: 60 TABLET | Refills: 0 | Status: SHIPPED | OUTPATIENT
Start: 2021-08-20 | End: 2021-09-20

## 2021-08-20 RX ORDER — FENOFIBRATE 145 MG/1
TABLET, COATED ORAL
Qty: 90 TABLET | Refills: 0 | Status: SHIPPED | OUTPATIENT
Start: 2021-08-20 | End: 2021-11-15 | Stop reason: ALTCHOICE

## 2021-08-20 RX ORDER — METFORMIN HYDROCHLORIDE 500 MG/1
TABLET, EXTENDED RELEASE ORAL
Qty: 90 TABLET | Refills: 0 | Status: SHIPPED | OUTPATIENT
Start: 2021-08-20 | End: 2021-11-19

## 2021-09-20 DIAGNOSIS — M54.14 THORACIC RADICULOPATHY: ICD-10-CM

## 2021-09-20 DIAGNOSIS — E03.9 ACQUIRED HYPOTHYROIDISM: ICD-10-CM

## 2021-09-20 RX ORDER — LEVOTHYROXINE SODIUM 112 UG/1
TABLET ORAL
Qty: 90 TABLET | Refills: 0 | Status: SHIPPED | OUTPATIENT
Start: 2021-09-20 | End: 2021-12-17

## 2021-09-20 RX ORDER — GABAPENTIN 100 MG/1
CAPSULE ORAL
Qty: 90 CAPSULE | Refills: 0 | Status: SHIPPED | OUTPATIENT
Start: 2021-09-20 | End: 2021-12-17

## 2021-09-20 RX ORDER — ROPINIROLE 1 MG/1
TABLET, FILM COATED ORAL
Qty: 60 TABLET | Refills: 0 | Status: SHIPPED | OUTPATIENT
Start: 2021-09-20 | End: 2021-10-12

## 2021-09-22 ENCOUNTER — TELEPHONE (OUTPATIENT)
Dept: INTERNAL MEDICINE CLINIC | Age: 71
End: 2021-09-22

## 2021-09-28 RX ORDER — CHLORTHALIDONE 50 MG/1
TABLET ORAL
Qty: 90 TABLET | Refills: 0 | Status: SHIPPED | OUTPATIENT
Start: 2021-09-28 | End: 2021-12-17

## 2021-10-12 RX ORDER — ROPINIROLE 1 MG/1
TABLET, FILM COATED ORAL
Qty: 60 TABLET | Refills: 0 | Status: SHIPPED | OUTPATIENT
Start: 2021-10-12 | End: 2021-11-18

## 2021-11-12 ENCOUNTER — PATIENT MESSAGE (OUTPATIENT)
Dept: INTERNAL MEDICINE CLINIC | Age: 71
End: 2021-11-12

## 2021-11-15 RX ORDER — FENOFIBRATE 134 MG/1
134 CAPSULE ORAL
Qty: 90 CAPSULE | Refills: 2 | Status: SHIPPED | OUTPATIENT
Start: 2021-11-15 | End: 2022-07-31

## 2021-11-18 RX ORDER — ROPINIROLE 1 MG/1
TABLET, FILM COATED ORAL
Qty: 60 TABLET | Refills: 0 | Status: SHIPPED | OUTPATIENT
Start: 2021-11-18 | End: 2021-12-17

## 2021-11-19 RX ORDER — METFORMIN HYDROCHLORIDE 500 MG/1
TABLET, EXTENDED RELEASE ORAL
Qty: 90 TABLET | Refills: 0 | Status: SHIPPED | OUTPATIENT
Start: 2021-11-19 | End: 2022-02-09

## 2021-12-17 DIAGNOSIS — M54.14 THORACIC RADICULOPATHY: ICD-10-CM

## 2021-12-17 DIAGNOSIS — E03.9 ACQUIRED HYPOTHYROIDISM: ICD-10-CM

## 2021-12-17 RX ORDER — ROPINIROLE 1 MG/1
TABLET, FILM COATED ORAL
Qty: 60 TABLET | Refills: 0 | Status: SHIPPED | OUTPATIENT
Start: 2021-12-17 | End: 2022-01-10

## 2021-12-17 RX ORDER — GABAPENTIN 100 MG/1
CAPSULE ORAL
Qty: 90 CAPSULE | Refills: 0 | Status: SHIPPED | OUTPATIENT
Start: 2021-12-17 | End: 2022-01-09

## 2021-12-17 RX ORDER — CHLORTHALIDONE 50 MG/1
TABLET ORAL
Qty: 90 TABLET | Refills: 0 | Status: SHIPPED | OUTPATIENT
Start: 2021-12-17 | End: 2022-03-27

## 2021-12-17 RX ORDER — CLONIDINE HYDROCHLORIDE 0.2 MG/1
TABLET ORAL
Qty: 180 TABLET | Refills: 0 | Status: SHIPPED | OUTPATIENT
Start: 2021-12-17 | End: 2022-06-19

## 2021-12-17 RX ORDER — LEVOTHYROXINE SODIUM 112 UG/1
TABLET ORAL
Qty: 90 TABLET | Refills: 0 | Status: SHIPPED | OUTPATIENT
Start: 2021-12-17 | End: 2022-03-27

## 2021-12-17 NOTE — TELEPHONE ENCOUNTER
Chief Complaint   Patient presents with    Medication Refill     Last Appointment with Dr. Mialn Dunne:  7/19/2021  No future appointments.

## 2022-01-08 DIAGNOSIS — M54.14 THORACIC RADICULOPATHY: ICD-10-CM

## 2022-01-09 RX ORDER — GABAPENTIN 100 MG/1
CAPSULE ORAL
Qty: 90 CAPSULE | Refills: 0 | Status: SHIPPED | OUTPATIENT
Start: 2022-01-09 | End: 2022-03-27

## 2022-01-10 RX ORDER — ROPINIROLE 1 MG/1
TABLET, FILM COATED ORAL
Qty: 60 TABLET | Refills: 0 | Status: SHIPPED | OUTPATIENT
Start: 2022-01-10 | End: 2022-02-09

## 2022-02-09 RX ORDER — ROPINIROLE 1 MG/1
TABLET, FILM COATED ORAL
Qty: 60 TABLET | Refills: 0 | Status: SHIPPED | OUTPATIENT
Start: 2022-02-09 | End: 2022-03-28

## 2022-02-09 RX ORDER — METFORMIN HYDROCHLORIDE 500 MG/1
TABLET, EXTENDED RELEASE ORAL
Qty: 90 TABLET | Refills: 0 | Status: SHIPPED | OUTPATIENT
Start: 2022-02-09 | End: 2022-05-09

## 2022-02-18 NOTE — PROGRESS NOTES
Pt states she saw Dr. Deisy Cantor. He told her not to do surgery yet and to try shots. Pt has had 1 shot and goes back 08/15/17 for follow up. Pt went to 4 sessions of PT and she think both the shot and PT helped, but on vacation her legs were swollen a lot. 18-Feb-2022

## 2022-03-01 ENCOUNTER — TELEPHONE (OUTPATIENT)
Dept: INTERNAL MEDICINE CLINIC | Age: 72
End: 2022-03-01

## 2022-03-01 NOTE — TELEPHONE ENCOUNTER
Returned call to patient. [facility] she received a bill from insurance about appointment dated 7/19/2021 for $179.00. She states the visit was for her wellness and should be covered. She spoke with our billing department and was told to call PCP office to have Dr. Shanita Patel re-code the visit. Please advise.

## 2022-03-01 NOTE — TELEPHONE ENCOUNTER
Reason for call:  States she needs info for [facility] denial.  Requesting call back.     Is this a new problem: yes     Date of last appointment:  2/23/2022     Can we respond via [employer]: no    Best call back number: [phone]

## 2022-03-09 NOTE — TELEPHONE ENCOUNTER
I realized I never Documented on this. I did message the patient and advised her of the below and she thanked us for following up and advised she was going to call and pay the bill.

## 2022-03-20 PROBLEM — E66.01 SEVERE OBESITY (HCC): Status: ACTIVE | Noted: 2019-06-10

## 2022-03-27 DIAGNOSIS — E03.9 ACQUIRED HYPOTHYROIDISM: ICD-10-CM

## 2022-03-27 DIAGNOSIS — M54.14 THORACIC RADICULOPATHY: ICD-10-CM

## 2022-03-27 RX ORDER — LEVOTHYROXINE SODIUM 112 UG/1
TABLET ORAL
Qty: 90 TABLET | Refills: 0 | Status: SHIPPED | OUTPATIENT
Start: 2022-03-27 | End: 2022-06-19

## 2022-03-27 RX ORDER — GABAPENTIN 100 MG/1
CAPSULE ORAL
Qty: 90 CAPSULE | Refills: 0 | Status: SHIPPED | OUTPATIENT
Start: 2022-03-27 | End: 2022-06-19

## 2022-03-27 RX ORDER — CHLORTHALIDONE 50 MG/1
TABLET ORAL
Qty: 90 TABLET | Refills: 0 | Status: SHIPPED | OUTPATIENT
Start: 2022-03-27 | End: 2022-06-19

## 2022-03-28 RX ORDER — ROPINIROLE 1 MG/1
TABLET, FILM COATED ORAL
Qty: 60 TABLET | Refills: 0 | Status: SHIPPED | OUTPATIENT
Start: 2022-03-28 | End: 2022-04-22

## 2022-04-22 RX ORDER — ROPINIROLE 1 MG/1
TABLET, FILM COATED ORAL
Qty: 60 TABLET | Refills: 0 | Status: SHIPPED | OUTPATIENT
Start: 2022-04-22 | End: 2022-05-20

## 2022-05-09 RX ORDER — METFORMIN HYDROCHLORIDE 500 MG/1
TABLET, EXTENDED RELEASE ORAL
Qty: 90 TABLET | Refills: 0 | Status: SHIPPED | OUTPATIENT
Start: 2022-05-09 | End: 2022-05-20

## 2022-05-20 RX ORDER — ROPINIROLE 1 MG/1
TABLET, FILM COATED ORAL
Qty: 60 TABLET | Refills: 0 | Status: SHIPPED | OUTPATIENT
Start: 2022-05-20 | End: 2022-06-20

## 2022-05-20 RX ORDER — METFORMIN HYDROCHLORIDE 500 MG/1
TABLET, EXTENDED RELEASE ORAL
Qty: 90 TABLET | Refills: 0 | Status: SHIPPED | OUTPATIENT
Start: 2022-05-20

## 2022-06-18 DIAGNOSIS — M54.14 THORACIC RADICULOPATHY: ICD-10-CM

## 2022-06-18 DIAGNOSIS — E03.9 ACQUIRED HYPOTHYROIDISM: ICD-10-CM

## 2022-06-19 RX ORDER — GABAPENTIN 100 MG/1
CAPSULE ORAL
Qty: 90 CAPSULE | Refills: 0 | Status: SHIPPED | OUTPATIENT
Start: 2022-06-19 | End: 2022-09-12

## 2022-06-19 RX ORDER — CLONIDINE HYDROCHLORIDE 0.2 MG/1
TABLET ORAL
Qty: 180 TABLET | Refills: 0 | Status: SHIPPED | OUTPATIENT
Start: 2022-06-19

## 2022-06-19 RX ORDER — LEVOTHYROXINE SODIUM 112 UG/1
TABLET ORAL
Qty: 90 TABLET | Refills: 0 | Status: SHIPPED | OUTPATIENT
Start: 2022-06-19 | End: 2022-09-12

## 2022-06-19 RX ORDER — CHLORTHALIDONE 50 MG/1
TABLET ORAL
Qty: 90 TABLET | Refills: 0 | Status: SHIPPED | OUTPATIENT
Start: 2022-06-19 | End: 2022-09-12

## 2022-06-20 RX ORDER — ROPINIROLE 1 MG/1
TABLET, FILM COATED ORAL
Qty: 60 TABLET | Refills: 0 | Status: SHIPPED | OUTPATIENT
Start: 2022-06-20 | End: 2022-07-15

## 2022-07-06 ENCOUNTER — OFFICE VISIT (OUTPATIENT)
Dept: INTERNAL MEDICINE CLINIC | Age: 72
End: 2022-07-06
Payer: MEDICARE

## 2022-07-06 VITALS
SYSTOLIC BLOOD PRESSURE: 126 MMHG | TEMPERATURE: 97.6 F | RESPIRATION RATE: 16 BRPM | BODY MASS INDEX: 33.61 KG/M2 | HEIGHT: 61 IN | HEART RATE: 79 BPM | DIASTOLIC BLOOD PRESSURE: 83 MMHG | OXYGEN SATURATION: 98 % | WEIGHT: 178 LBS

## 2022-07-06 DIAGNOSIS — R73.01 FASTING HYPERGLYCEMIA: ICD-10-CM

## 2022-07-06 DIAGNOSIS — E03.9 ACQUIRED HYPOTHYROIDISM: ICD-10-CM

## 2022-07-06 DIAGNOSIS — Z00.00 MEDICARE ANNUAL WELLNESS VISIT, SUBSEQUENT: Primary | ICD-10-CM

## 2022-07-06 DIAGNOSIS — M12.812 ROTATOR CUFF ARTHROPATHY, LEFT: ICD-10-CM

## 2022-07-06 DIAGNOSIS — E78.2 MIXED HYPERLIPIDEMIA: ICD-10-CM

## 2022-07-06 DIAGNOSIS — I10 ESSENTIAL HYPERTENSION: ICD-10-CM

## 2022-07-06 DIAGNOSIS — G57.93 NEUROPATHIC PAIN OF BOTH FEET: ICD-10-CM

## 2022-07-06 LAB
ALBUMIN SERPL-MCNC: 4.2 G/DL (ref 3.5–5)
ALBUMIN/GLOB SERPL: 1.3 {RATIO} (ref 1.1–2.2)
ALP SERPL-CCNC: 42 U/L (ref 45–117)
ALT SERPL-CCNC: 22 U/L (ref 12–78)
ANION GAP SERPL CALC-SCNC: 5 MMOL/L (ref 5–15)
AST SERPL-CCNC: 17 U/L (ref 15–37)
BASOPHILS # BLD: 0.1 K/UL (ref 0–0.1)
BASOPHILS NFR BLD: 1 % (ref 0–1)
BILIRUB SERPL-MCNC: 0.3 MG/DL (ref 0.2–1)
BUN SERPL-MCNC: 23 MG/DL (ref 6–20)
BUN/CREAT SERPL: 24 (ref 12–20)
CALCIUM SERPL-MCNC: 10 MG/DL (ref 8.5–10.1)
CHLORIDE SERPL-SCNC: 105 MMOL/L (ref 97–108)
CHOLEST SERPL-MCNC: 228 MG/DL
CO2 SERPL-SCNC: 28 MMOL/L (ref 21–32)
CREAT SERPL-MCNC: 0.95 MG/DL (ref 0.55–1.02)
DIFFERENTIAL METHOD BLD: NORMAL
EOSINOPHIL # BLD: 0.2 K/UL (ref 0–0.4)
EOSINOPHIL NFR BLD: 2 % (ref 0–7)
ERYTHROCYTE [DISTWIDTH] IN BLOOD BY AUTOMATED COUNT: 14 % (ref 11.5–14.5)
EST. AVERAGE GLUCOSE BLD GHB EST-MCNC: 128 MG/DL
GLOBULIN SER CALC-MCNC: 3.2 G/DL (ref 2–4)
GLUCOSE SERPL-MCNC: 113 MG/DL (ref 65–100)
HBA1C MFR BLD: 6.1 % (ref 4–5.6)
HCT VFR BLD AUTO: 43.5 % (ref 35–47)
HDLC SERPL-MCNC: 69 MG/DL
HDLC SERPL: 3.3 {RATIO} (ref 0–5)
HGB BLD-MCNC: 13.5 G/DL (ref 11.5–16)
IMM GRANULOCYTES # BLD AUTO: 0 K/UL (ref 0–0.04)
IMM GRANULOCYTES NFR BLD AUTO: 0 % (ref 0–0.5)
LDLC SERPL CALC-MCNC: 116.4 MG/DL (ref 0–100)
LYMPHOCYTES # BLD: 2.5 K/UL (ref 0.8–3.5)
LYMPHOCYTES NFR BLD: 32 % (ref 12–49)
MCH RBC QN AUTO: 28.4 PG (ref 26–34)
MCHC RBC AUTO-ENTMCNC: 31 G/DL (ref 30–36.5)
MCV RBC AUTO: 91.4 FL (ref 80–99)
MONOCYTES # BLD: 0.5 K/UL (ref 0–1)
MONOCYTES NFR BLD: 7 % (ref 5–13)
NEUTS SEG # BLD: 4.5 K/UL (ref 1.8–8)
NEUTS SEG NFR BLD: 58 % (ref 32–75)
NRBC # BLD: 0 K/UL (ref 0–0.01)
NRBC BLD-RTO: 0 PER 100 WBC
PLATELET # BLD AUTO: 333 K/UL (ref 150–400)
PMV BLD AUTO: 10.4 FL (ref 8.9–12.9)
POTASSIUM SERPL-SCNC: 4.3 MMOL/L (ref 3.5–5.1)
PROT SERPL-MCNC: 7.4 G/DL (ref 6.4–8.2)
RBC # BLD AUTO: 4.76 M/UL (ref 3.8–5.2)
SODIUM SERPL-SCNC: 138 MMOL/L (ref 136–145)
T4 FREE SERPL-MCNC: 1.2 NG/DL (ref 0.8–1.5)
TRIGL SERPL-MCNC: 213 MG/DL (ref ?–150)
TSH SERPL DL<=0.05 MIU/L-ACNC: 1.95 UIU/ML (ref 0.36–3.74)
VLDLC SERPL CALC-MCNC: 42.6 MG/DL
WBC # BLD AUTO: 7.7 K/UL (ref 3.6–11)

## 2022-07-06 PROCEDURE — 1090F PRES/ABSN URINE INCON ASSESS: CPT | Performed by: INTERNAL MEDICINE

## 2022-07-06 PROCEDURE — 99214 OFFICE O/P EST MOD 30 MIN: CPT | Performed by: INTERNAL MEDICINE

## 2022-07-06 PROCEDURE — G8427 DOCREV CUR MEDS BY ELIG CLIN: HCPCS | Performed by: INTERNAL MEDICINE

## 2022-07-06 PROCEDURE — 1101F PT FALLS ASSESS-DOCD LE1/YR: CPT | Performed by: INTERNAL MEDICINE

## 2022-07-06 PROCEDURE — G8754 DIAS BP LESS 90: HCPCS | Performed by: INTERNAL MEDICINE

## 2022-07-06 PROCEDURE — G8417 CALC BMI ABV UP PARAM F/U: HCPCS | Performed by: INTERNAL MEDICINE

## 2022-07-06 PROCEDURE — G0463 HOSPITAL OUTPT CLINIC VISIT: HCPCS | Performed by: INTERNAL MEDICINE

## 2022-07-06 PROCEDURE — G9899 SCRN MAM PERF RSLTS DOC: HCPCS | Performed by: INTERNAL MEDICINE

## 2022-07-06 PROCEDURE — G8399 PT W/DXA RESULTS DOCUMENT: HCPCS | Performed by: INTERNAL MEDICINE

## 2022-07-06 PROCEDURE — G8536 NO DOC ELDER MAL SCRN: HCPCS | Performed by: INTERNAL MEDICINE

## 2022-07-06 PROCEDURE — G8752 SYS BP LESS 140: HCPCS | Performed by: INTERNAL MEDICINE

## 2022-07-06 PROCEDURE — G8510 SCR DEP NEG, NO PLAN REQD: HCPCS | Performed by: INTERNAL MEDICINE

## 2022-07-06 PROCEDURE — G0439 PPPS, SUBSEQ VISIT: HCPCS | Performed by: INTERNAL MEDICINE

## 2022-07-06 PROCEDURE — 3017F COLORECTAL CA SCREEN DOC REV: CPT | Performed by: INTERNAL MEDICINE

## 2022-07-06 NOTE — PATIENT INSTRUCTIONS
Medicare Wellness Visit, Female     The best way to live healthy is to have a lifestyle where you eat a well-balanced diet, exercise regularly, limit alcohol use, and quit all forms of tobacco/nicotine, if applicable. Regular preventive services are another way to keep healthy. Preventive services (vaccines, screening tests, monitoring & exams) can help personalize your care plan, which helps you manage your own care. Screening tests can find health problems at the earliest stages, when they are easiest to treat. Ml follows the current, evidence-based guidelines published by the [facility] ([facility]) when recommending preventive services for our patients. Because we follow these guidelines, sometimes recommendations change over time as research supports it. (For example, mammograms used to be recommended annually. Even though Medicare will still pay for an annual mammogram, the newer guidelines recommend a mammogram every two years for women of average risk). Of course, you and your doctor may decide to screen more often for some diseases, based on your risk and your co-morbidities (chronic disease you are already diagnosed with). Preventive services for you include:  - Medicare offers their members a free annual wellness visit, which is time for you and your primary care provider to discuss and plan for your preventive service needs. Take advantage of this benefit every year!  -All adults over the age of 72 should receive the recommended pneumonia vaccines. Current USPSTF guidelines recommend a series of two vaccines for the best pneumonia protection.   -All adults should have a flu vaccine yearly and a tetanus vaccine every 10 years.   -All adults age 48 and older should receive the shingles vaccines (series of two vaccines).       -All adults age 38-68 who are overweight should have a diabetes screening test once every three years.   -All adults born between [birth year] and 1964 should be screened once for Hepatitis C.  -Other screening tests and preventive services for persons with diabetes include: an eye exam to screen for diabetic retinopathy, a kidney function test, a foot exam, and stricter control over your cholesterol.   -Cardiovascular screening for adults with routine risk involves an electrocardiogram (ECG) at intervals determined by your doctor.   -Colorectal cancer screenings should be done for adults age 54-65 with no increased risk factors for colorectal cancer. There are a number of acceptable methods of screening for this type of cancer. Each test has its own benefits and drawbacks. Discuss with your doctor what is most appropriate for you during your annual wellness visit. The different tests include: colonoscopy (considered the best screening method), a fecal occult blood test, a fecal DNA test, and sigmoidoscopy.    -A bone mass density test is recommended when a woman turns 65 to screen for osteoporosis. This test is only recommended one time, as a screening. Some providers will use this same test as a disease monitoring tool if you already have osteoporosis. -Breast cancer screenings are recommended every other year for women of normal risk, age 54-69.  -Cervical cancer screenings for women over age 72 are only recommended with certain risk factors. Here is a list of your current Health Maintenance items (your personalized list of preventive services) with a due date: There are no preventive care reminders to display for this patient.

## 2022-07-06 NOTE — PROGRESS NOTES
This is the Subsequent Medicare Annual Wellness Exam, performed 12 months or more after the Initial AWV or the last Subsequent AWV    I have reviewed the patient's medical history in detail and updated the computerized patient record. As well as a follow-up of her health issues. She has had ongoing issues recently with her left shoulder. She has been doing physical therapy and it does not seem to be helping a whole lot. She is going back today to get an injection in her left shoulder again. No chest pains or shortness of breath. No cough or wheeze. No change in bowel or bladder habits. Wants to continue to take her Premarin as she has been unable to discontinue it in the past secondary to withdrawal symptoms. ROS - Per HPI    Physical Examination: General appearance - alert, well appearing, and in no distress  Ears - bilateral TM's and external ear canals normal  Mouth - mucous membranes moist, pharynx normal without lesions  Neck - supple, no significant adenopathy  Lymphatics - no palpable lymphadenopathy, no hepatosplenomegaly  Chest - clear to auscultation, no wheezes, rales or rhonchi, symmetric air entry  Heart - normal rate, regular rhythm, normal S1, S2, no murmurs, rubs, clicks or gallops  Abdomen - soft, nontender, nondistended, no masses or organomegaly  Back exam - full range of motion, no tenderness, palpable spasm or pain on motion  Neurological - alert, oriented, normal speech, no focal findings or movement disorder noted, motor and sensory grossly normal bilaterally  Musculoskeletal - abnormal exam of left shoulder with decreased range of motion and painful movement. Extremities - peripheral pulses normal, no pedal edema, no clubbing or cyanosis        Assessment/Plan   Education and counseling provided:  Are appropriate based on today's review and evaluation  End-of-Life planning (with patient's consent)  Screening Mammography  Diabetes screening test    1.  Medicare annual wellness visit, subsequent  2. Essential hypertension-blood pressure well controlled on current meds and will continue these.  -     METABOLIC PANEL, COMPREHENSIVE; Future  -     CBC WITH AUTOMATED DIFF; Future  3. Acquired hypothyroidism-appears euthyroid. Check levels and adjust meds as needed. -     TSH 3RD GENERATION; Future  -     T4, FREE; Future  4. Mixed hyperlipidemia- diet controlled. Will check labs for that. -     LIPID PANEL; Future  5. Neuropathic pain of both feet-controlled with gabapentin. 6. Fasting hyperglycemia-continue to work on diet and exercise and use metformin to keep this under control.  -     HEMOGLOBIN A1C WITH EAG; Future  7. Rotator cuff arthropathy, left-agree with plans for repeat injection and more physical therapy. Depression Risk Factor Screening     3 most recent PHQ Screens 7/6/2022   Little interest or pleasure in doing things Not at all   Feeling down, depressed, irritable, or hopeless Not at all   Total Score PHQ 2 0       Alcohol & Drug Abuse Risk Screen   Do you average more than 1 drink per night or more than 7 drinks a week?: (P) No  On any one occasion in the past three months have you had more than 3 drinks containing alcohol?: (P) No            Functional Ability and Level of Safety   Hearing:  Hearing: (P) Patient reports hearing is good      Activities of Daily Living: The home contains: (P) handrails,rugs  Functional ADLs: (P) Patient does total self care     Ambulation:  Patient ambulates: (P) with mild difficulty  How far the patient can walk with difficulty: (P) Both ankles were operated on -- if walking more than 10,000 steps -- my ankles swell sometimes. Walking is difficult due to: (P) additional comments below  Additional comments about walking difficulties: (P) Ankle surgeries     Fall Risk:  Fall Risk Assessment, last 12 mths 7/6/2022   Able to walk? Yes   Fall in past 12 months? 0   Do you feel unsteady?  0   Are you worried about falling 0   Number of falls in past 12 months -   Fall with injury? -     Abuse Screen:  Do you ever feel afraid of your partner?: (P) No  Are you in a relationship with someone who physically or mentally threatens you?: (P) No  Is it safe for you to go home?: (P) Yes        Cognitive Screening   Has your family/caregiver stated any concerns about your memory?: (P) No         Health Maintenance Due   There are no preventive care reminders to display for this patient.     Patient Care Team   Patient Care Team:  Roshan Sandoval MD as PCP - General  Roshan Sandoval MD as PCP - [address] [place] [address] [facility]  Latoya Maguire MD (Gastroenterology)  Marisol Morales DPM (Podiatry)  Mallika Mazariegos MD (Neurology)  Pamela Skinner MD (Neurology)  Ten Bailey MD (Ophthalmology)  Abner Elmore MD (Neurology)  Jensen Cifuentes NP (General Surgery)  Elizabeth Angel(Megan) MD FALLON (Orthopedic Surgery)    History     Patient Active Problem List   Diagnosis Code    Back pain M54.9    Essential hypertension I10    Acquired hypothyroidism E03.9    Advance directive discussed with patient Z70.80    Mixed hyperlipidemia E78.2    Neuropathic pain of both feet G57.93    Severe obesity ([address] E66.01     Past Medical History:   Diagnosis Date    Arthritis     Cancer ([address]     skin BASAL ON NOSE    Chronic pain     back - PT    COVID-19 vaccine series completed     MODERNA 21 & 21    Diabetes ([address]     PRE DIABETIC    Hypercholesterolemia     Hypertension     Ill-defined condition     N/V    Ill-defined condition     right eye scheduled for cataract removed 2017    Thyroid disease       Past Surgical History:   Procedure Laterality Date    COLONOSCOPY N/A 2021    COLONOSCOPY   :- [facility] ENDOSCOPY    HX CATARACT REMOVAL Bilateral 2018    HX  SECTION  77, 80    X2    HX CHOLECYSTECTOMY  2017    Gallbladder taken out by Dr. Laila Agrawal 1/29/16    Dr. Husain Flash HX HEENT Bilateral 02/2020    EYE LIFT    HX HEENT  01/2020    eyelid surgery     HX HEENT  2020    oral sx     HX HERNIA REPAIR  06/25/2021    open recurrent umbilical hernia repair with mesh by Dr. Lyndon Menendez at [facility]    Hattie 8 HX LAP CHOLECYSTECTOMY  11/20/2017    Momin    HX MALIGNANT SKIN LESION EXCISION  2012    skin cancer from nose - BCCA per pt    HX MOHS PROCEDURES      HX ORTHOPAEDIC Left 11/11/2019    Ankle srugery    HX ORTHOPAEDIC Right 03/22/2021    Right ankle achilles bone spur repair.  HX OTHER SURGICAL  2020    ORAL SURGERY    HX SHOULDER REPLACEMENT Right 06/04/2020    Dr. Jonnie Lovelace  1995,3/2007, 11/13    breast biopsy - all benign     Current Outpatient Medications   Medication Sig Dispense Refill    rOPINIRole (REQUIP) 1 mg tablet Take 1 tablet by mouth twice daily 60 Tablet 0    conjugated estrogens (Premarin) 0.625 mg tablet Take 1 tablet by mouth once daily 90 Tablet 0    cloNIDine HCL (CATAPRES) 0.2 mg tablet Take 1 tablet by mouth twice daily 180 Tablet 0    Euthyrox 112 mcg tablet TAKE 1 TABLET BY MOUTH ONCE DAILY BEFORE BREAKFAST . APPOINTMENT REQUIRED FOR FUTURE REFILLS 90 Tablet 0    chlorthalidone (HYGROTEN) 50 mg tablet Take 1 tablet by mouth once daily 90 Tablet 0    gabapentin (NEURONTIN) 100 mg capsule TAKE 1 CAPSULE BY MOUTH THREE TIMES DAILY . DO NOT EXCEED 3 PER 24 HOURS 90 Capsule 0    metFORMIN ER (GLUCOPHAGE XR) 500 mg tablet TAKE 1 TABLET BY MOUTH ONCE DAILY WITH SUPPER 90 Tablet 0    fenofibrate micronized (LOFIBRA) 134 mg capsule Take 1 Capsule by mouth every morning. 90 Capsule 2    ibuprofen (MOTRIN) 800 mg tablet Take 1 Tablet by mouth every six (6) hours as needed for Pain. 30 Tablet 0    triamcinolone acetonide (KENALOG) 0.1 % topical cream Apply  to affected area two (2) times a day.  use thin layer 45 g 1    Cholecalciferol, Vitamin D3, (VITAMIN D3) 2,000 unit cap capsule Take 2,000 Units by mouth daily. 90 Cap 0    vitamin E (AQUA GEMS) 400 unit capsule Take 400 Units by mouth daily.  GLUCOSAMINE/CHONDROITIN SULF A (GLUCOSAMINE-CHONDROITIN PO) Take  by mouth. 1500mg/1200mg per 2 pills. Takes one pill once daily.  loratadine (ALLERCLEAR) 10 mg tablet Take 10 mg by mouth daily as needed for Allergies.        Allergies   Allergen Reactions    Codeine Other (comments)     Hallucination/room spinning    Sudafed [Pseudoephedrine Hcl] Palpitations    Darvocet A500 [Propoxyphene N-Acetaminophen] Nausea and Vomiting       Family History   Problem Relation Age of Onset    Heart Disease Father         Quad by pass    Diabetes Father         Full insolin-    Hypertension Mother             Cancer Mother         Bassill cell on nose-    Diabetes Brother         dialysis-     Heart Disease Brother         Quad by pass    Heart Disease Brother         Heart Murmur    Hypertension Brother     Anesth Problems Neg Hx      Social History     Tobacco Use    Smoking status: Never Smoker    Smokeless tobacco: Never Used   Substance Use Topics    Alcohol use: Never     Comment: maybe once a year - has a sip         Evon Fay MD

## 2022-07-15 RX ORDER — ROPINIROLE 1 MG/1
TABLET, FILM COATED ORAL
Qty: 60 TABLET | Refills: 0 | Status: SHIPPED | OUTPATIENT
Start: 2022-07-15 | End: 2022-08-17

## 2022-07-31 RX ORDER — FENOFIBRATE 134 MG/1
CAPSULE ORAL
Qty: 90 CAPSULE | Refills: 0 | Status: SHIPPED | OUTPATIENT
Start: 2022-07-31

## 2022-08-17 RX ORDER — ROPINIROLE 1 MG/1
TABLET, FILM COATED ORAL
Qty: 60 TABLET | Refills: 0 | Status: SHIPPED | OUTPATIENT
Start: 2022-08-17 | End: 2022-09-19

## 2022-09-12 DIAGNOSIS — E03.9 ACQUIRED HYPOTHYROIDISM: ICD-10-CM

## 2022-09-12 DIAGNOSIS — M54.14 THORACIC RADICULOPATHY: ICD-10-CM

## 2022-09-12 PROBLEM — Z79.890 HORMONE REPLACEMENT THERAPY: Status: ACTIVE | Noted: 2022-09-12

## 2022-09-12 RX ORDER — GABAPENTIN 100 MG/1
CAPSULE ORAL
Qty: 90 CAPSULE | Refills: 0 | Status: SHIPPED | OUTPATIENT
Start: 2022-09-12

## 2022-09-12 RX ORDER — LEVOTHYROXINE SODIUM 112 UG/1
TABLET ORAL
Qty: 90 TABLET | Refills: 0 | Status: SHIPPED | OUTPATIENT
Start: 2022-09-12

## 2022-09-12 RX ORDER — CHLORTHALIDONE 50 MG/1
TABLET ORAL
Qty: 90 TABLET | Refills: 0 | Status: SHIPPED | OUTPATIENT
Start: 2022-09-12

## 2022-09-19 RX ORDER — ROPINIROLE 1 MG/1
TABLET, FILM COATED ORAL
Qty: 60 TABLET | Refills: 0 | Status: SHIPPED | OUTPATIENT
Start: 2022-09-19 | End: 2022-10-18

## 2022-10-18 RX ORDER — ROPINIROLE 1 MG/1
TABLET, FILM COATED ORAL
Qty: 60 TABLET | Refills: 0 | Status: SHIPPED | OUTPATIENT
Start: 2022-10-18

## 2022-11-08 ENCOUNTER — APPOINTMENT (RX ONLY)
Dept: URBAN - METROPOLITAN AREA CLINIC 176 | Facility: CLINIC | Age: 72
Setting detail: DERMATOLOGY
End: 2022-11-08

## 2022-11-08 VITALS — HEIGHT: 70 IN | WEIGHT: 180 LBS

## 2022-11-08 DIAGNOSIS — L57.8 OTHER SKIN CHANGES DUE TO CHRONIC EXPOSURE TO NONIONIZING RADIATION: ICD-10-CM

## 2022-11-08 DIAGNOSIS — L85.3 XEROSIS CUTIS: ICD-10-CM | Status: INADEQUATELY CONTROLLED

## 2022-11-08 PROCEDURE — ? PRESCRIPTION

## 2022-11-08 PROCEDURE — 99203 OFFICE O/P NEW LOW 30 MIN: CPT | Mod: 25

## 2022-11-08 PROCEDURE — 96372 THER/PROPH/DIAG INJ SC/IM: CPT

## 2022-11-08 PROCEDURE — ? PRESCRIPTION SAMPLES PROVIDED

## 2022-11-08 PROCEDURE — ? INTRAMUSCULAR KENALOG

## 2022-11-08 PROCEDURE — ? COUNSELING

## 2022-11-08 RX ORDER — TRIAMCINOLONE ACETONIDE 1 MG/G
CREAM TOPICAL BID
Qty: 1 | Refills: 1 | Status: ERX | COMMUNITY
Start: 2022-11-08

## 2022-11-08 RX ADMIN — TRIAMCINOLONE ACETONIDE: 1 CREAM TOPICAL at 00:00

## 2022-11-08 ASSESSMENT — LOCATION ZONE DERM
LOCATION ZONE: ARM
LOCATION ZONE: FACE

## 2022-11-08 ASSESSMENT — LOCATION SIMPLE DESCRIPTION DERM
LOCATION SIMPLE: LEFT FOREHEAD
LOCATION SIMPLE: LEFT SHOULDER

## 2022-11-08 ASSESSMENT — LOCATION DETAILED DESCRIPTION DERM
LOCATION DETAILED: LEFT POSTERIOR SHOULDER
LOCATION DETAILED: LEFT INFERIOR MEDIAL FOREHEAD

## 2022-11-08 ASSESSMENT — SEVERITY ASSESSMENT: SEVERITY: MODERATE

## 2022-11-08 NOTE — PROCEDURE: INTRAMUSCULAR KENALOG
Consent: The risks of atrophy were reviewed with the patient.
Concentration (Mg/Ml) Of Additional Medication: 2.5
Administered By (Optional): Shawnee Abraham MA
Total Volume (Ccs): 1
Detail Level: None
Kenalog Preparation: kenalog
Concentration (Mg/Ml): 40.0
Add Option For Additional Mediation: No

## 2022-11-14 RX ORDER — FENOFIBRATE 134 MG/1
CAPSULE ORAL
Qty: 90 CAPSULE | Refills: 0 | Status: SHIPPED | OUTPATIENT
Start: 2022-11-14

## 2022-11-14 RX ORDER — METFORMIN HYDROCHLORIDE 500 MG/1
TABLET, EXTENDED RELEASE ORAL
Qty: 90 TABLET | Refills: 0 | Status: SHIPPED | OUTPATIENT
Start: 2022-11-14

## 2022-11-17 ENCOUNTER — PATIENT MESSAGE (OUTPATIENT)
Dept: INTERNAL MEDICINE CLINIC | Age: 72
End: 2022-11-17

## 2022-11-17 DIAGNOSIS — N63.20 MASS OF LEFT BREAST, UNSPECIFIED QUADRANT: Primary | ICD-10-CM

## 2022-11-17 NOTE — TELEPHONE ENCOUNTER
Orders Placed This Encounter    [facility] EMPL     Referral Priority:   Routine     Referral Type:   Consultation     Referral Reason:   Specialty Services Required     Referred to Provider:   Homa Yap MD     Number of Visits Requested:   1     The above orders were approved via [facility] per Dr. Rajendra Perry, III.

## 2022-11-28 RX ORDER — ROPINIROLE 1 MG/1
TABLET, FILM COATED ORAL
Qty: 60 TABLET | Refills: 0 | Status: SHIPPED | OUTPATIENT
Start: 2022-11-28

## 2022-11-30 ENCOUNTER — OFFICE VISIT (OUTPATIENT)
Dept: SURGERY | Age: 72
End: 2022-11-30
Payer: COMMERCIAL

## 2022-11-30 VITALS
WEIGHT: 181 LBS | HEIGHT: 61 IN | HEART RATE: 89 BPM | SYSTOLIC BLOOD PRESSURE: 185 MMHG | BODY MASS INDEX: 34.17 KG/M2 | DIASTOLIC BLOOD PRESSURE: 89 MMHG

## 2022-11-30 DIAGNOSIS — R92.2 DENSE BREAST TISSUE: ICD-10-CM

## 2022-11-30 DIAGNOSIS — N63.21 MASS OF UPPER OUTER QUADRANT OF LEFT BREAST: ICD-10-CM

## 2022-11-30 DIAGNOSIS — N60.42 MAMMARY DUCT ECTASIA OF LEFT BREAST: Primary | ICD-10-CM

## 2022-11-30 PROCEDURE — 1123F ACP DISCUSS/DSCN MKR DOCD: CPT | Performed by: SURGERY

## 2022-11-30 PROCEDURE — 3078F DIAST BP <80 MM HG: CPT | Performed by: SURGERY

## 2022-11-30 PROCEDURE — 76642 ULTRASOUND BREAST LIMITED: CPT | Performed by: SURGERY

## 2022-11-30 PROCEDURE — 3074F SYST BP LT 130 MM HG: CPT | Performed by: SURGERY

## 2022-11-30 PROCEDURE — 99203 OFFICE O/P NEW LOW 30 MIN: CPT | Performed by: SURGERY

## 2022-11-30 NOTE — PROGRESS NOTES
HISTORY OF PRESENT ILLNESS  Inge Miller is a 71 y.o. female. HPI NEW patient consult referred by  Dr. Mireya Pitts for LEFT breast mass. Patient states feels heavy. Previous lump in same breast, small potato size 1995 removed  Bilateral biopsies-Rajesh [id number]    Family History: none      Mammogram, 11/9/22, negative  Rajesh doctor's-report in cc      Review of Systems   All other systems reviewed and are negative.     Physical Exam    ASSESSMENT and PLAN  {ASSESSMENT/PLAN:26032}

## 2022-11-30 NOTE — PROGRESS NOTES
HISTORY OF PRESENT ILLNESS  Keron Cruz is a 71 y.o. female. HPI  NEW patient consult referred by  Dr. Gianni Weinstein for LEFT breast mass. Patient states feels heavy. Previous lump in same breast, small potato size  removed  Bilateral biopsies-Rajesh [id number]     Family History: none        Mammogram, 22, negative  Randall doctor's-report in cc      Past Medical History:   Diagnosis Date    Arthritis     Cancer ([facility]     skin BASAL ON NOSE    Chronic pain     back - PT    COVID-19 vaccine series completed     MODERNA 21 & 21    Diabetes ([facility]     PRE DIABETIC    Hypercholesterolemia     Hypertension     Ill-defined condition     N/V    Ill-defined condition     right eye scheduled for cataract removed 2017    Thyroid disease        Past Surgical History:   Procedure Laterality Date    COLONOSCOPY N/A 2021    COLONOSCOPY   :- performed by Nava Gould MD at [facility] 18 Bilateral 2018    HX  SECTION  77, 82    X2    HX CHOLECYSTECTOMY  2017    Gallbladder taken out by Dr. Joesphine Litten COLONOSCOPY  16    Dr. Lea Miller HEZENA Bilateral 2020    EYE LIFT    HX HEENT  2020    eyelid surgery     HX HEENT      oral sx     HX HERNIA REPAIR  2021    open recurrent umbilical hernia repair with mesh by Dr. Scooby Power at [address]  2017    Momin    HX MALIGNANT SKIN LESION EXCISION      skin cancer from nose - BCCA per pt    HX MOHS PROCEDURES      HX ORTHOPAEDIC Left 2019    Ankle srugery    HX ORTHOPAEDIC Right 2021    Right ankle achilles bone spur repair.      HX OTHER SURGICAL      ORAL SURGERY    HX SHOULDER REPLACEMENT Right 2020    Dr. Meilda Umanzor  [id number],     breast biopsy - all benign       Social History     Socioeconomic History    Marital status:      Spouse name: Not on file    Number of children: Not on file    Years of education: Not on file    Highest education level: Not on file   Occupational History    Not on file   Tobacco Use    Smoking status: Never    Smokeless tobacco: Never   Vaping Use    Vaping Use: Never used   Substance and Sexual Activity    Alcohol use: Never     Comment: maybe once a year - has a sip    Drug use: Never    Sexual activity: Yes     Partners: Male     Birth control/protection: Surgical     Comment: Hysterectomy   Other Topics Concern    Not on file   Social History Narrative    Not on file     Social Determinants of Health     Financial Resource Strain: Not on file   Food Insecurity: Not on file   Transportation Needs: Not on file   Physical Activity: Not on file   Stress: Not on file   Social Connections: Not on file   Intimate Partner Violence: Not on file   Housing Stability: Not on file       Current Outpatient Medications on File Prior to Visit   Medication Sig Dispense Refill    conjugated estrogens (Premarin) 0.625 mg tablet Take 1 tablet by mouth once daily 90 Tablet 0    cloNIDine HCL (CATAPRES) 0.2 mg tablet Take 1 tablet by mouth twice daily 180 Tablet 0    Cholecalciferol, Vitamin D3, (VITAMIN D3) 2,000 unit cap capsule Take 2,000 Units by mouth daily. 90 Cap 0    rOPINIRole (REQUIP) 1 mg tablet Take 1 tablet by mouth twice daily 60 Tablet 0    fenofibrate micronized (LOFIBRA) 134 mg capsule Take 1 capsule by mouth once daily in the morning 90 Capsule 0    metFORMIN ER (GLUCOPHAGE XR) 500 mg tablet TAKE 1 TABLET BY MOUTH ONCE DAILY WITH SUPPER 90 Tablet 0    gabapentin (NEURONTIN) 100 mg capsule TAKE 1 CAPSULE BY MOUTH THREE TIMES DAILY . DO NOT EXCEED 3 PER 24 HOURS 90 Capsule 0    Euthyrox 112 mcg tablet TAKE 1 TABLET BY MOUTH ONCE DAILY BEFORE BREAKFAST .  APPOINTMENT REQUIRED FOR FUTURE REFILLS 90 Tablet 0    chlorthalidone (HYGROTEN) 50 mg tablet Take 1 tablet by mouth once daily 90 Tablet 0 ibuprofen (MOTRIN) 800 mg tablet Take 1 Tablet by mouth every six (6) hours as needed for Pain. 30 Tablet 0    triamcinolone acetonide (KENALOG) 0.1 % topical cream Apply  to affected area two (2) times a day. use thin layer 45 g 1    vitamin E (AQUA GEMS) 400 unit capsule Take 400 Units by mouth daily. GLUCOSAMINE/CHONDROITIN SULF A (GLUCOSAMINE-CHONDROITIN PO) Take  by mouth. 1500mg/1200mg per 2 pills. Takes one pill once daily. loratadine (ALLERCLEAR) 10 mg tablet Take 10 mg by mouth daily as needed for Allergies. No current facility-administered medications on file prior to visit. Allergies   Allergen Reactions    Codeine Other (comments)     Hallucination/room spinning    Sudafed [Pseudoephedrine Hcl] Palpitations    Darvocet A500 [Propoxyphene N-Acetaminophen] Nausea and Vomiting       OB History    No obstetric history on file. ROS        Physical Exam  Exam conducted with a chaperone present. Constitutional:       Appearance: She is well-developed. She is not diaphoretic. HENT:      Head: Normocephalic and atraumatic. Right Ear: External ear normal.      Left Ear: External ear normal.   Eyes:      General: No scleral icterus. Right eye: No discharge. Left eye: No discharge. Pupils: Pupils are equal, round, and reactive to light. Neck:      Thyroid: No thyromegaly. Vascular: No JVD. Trachea: No tracheal deviation. Cardiovascular:      Rate and Rhythm: Normal rate and regular rhythm. Heart sounds: Normal heart sounds. Pulmonary:      Effort: Pulmonary effort is normal. No tachypnea, accessory muscle usage or respiratory distress. Breath sounds: Normal breath sounds. No stridor. Chest:   Breasts:     Breasts are symmetrical.      Right: No inverted nipple, mass, nipple discharge, skin change or tenderness. Left: No inverted nipple, mass, nipple discharge, skin change or tenderness.        Abdominal:      General: There is no distension. Palpations: Abdomen is soft. There is no mass. Tenderness: There is no abdominal tenderness. Musculoskeletal:         General: Normal range of motion. Cervical back: Normal range of motion and neck supple. Lymphadenopathy:      Cervical: No cervical adenopathy. Skin:     General: Skin is warm and dry. Neurological:      Mental Status: She is alert and oriented to person, place, and time. Psychiatric:         Speech: Speech normal.         Behavior: Behavior normal.         Thought Content: Thought content normal.         Judgment: Judgment normal.       BREAST ULTRASOUND  Indication: LEFT breast mass. Technique: The area was scanned using a high-frequency linear-array near-field transducer  Findings: Some duct ectasia and dense breast tissue. Impression: No worrisome findings. Disposition: Diagnostic mammogram with ultrasound. ASSESSMENT and PLAN    ICD-10-CM ICD-9-CM    1. Mammary duct ectasia of left breast  N60.42 610.4       2. Dense breast tissue  R92.2 793.82         New patient presents for evaluation of LEFT breast mass, and is doing well overall. Upon physical examination not some palpable thickening at upper outer quadrant, near scar of LEFT breast. Ultrasound visualizes some duct ectasia and dense breast tissue. For further evaluation I will put order in for diagnostic mammogram with ultrasound. Follow up as needed. This plan was reviewed with the patient and patient agrees. All questions were answered. Total time spent was 40 minutes.       Written by Reynold Galdamez, as dictated by Dr. Lynn Burnette MD.

## 2022-12-06 ENCOUNTER — HOSPITAL ENCOUNTER (OUTPATIENT)
Dept: MAMMOGRAPHY | Age: 72
Discharge: HOME | End: 2022-12-06
Attending: SURGERY
Payer: MEDICARE

## 2022-12-06 DIAGNOSIS — N63.21 MASS OF UPPER OUTER QUADRANT OF LEFT BREAST: ICD-10-CM

## 2022-12-06 PROCEDURE — 77061 BREAST TOMOSYNTHESIS UNI: CPT

## 2022-12-06 PROCEDURE — 76642 ULTRASOUND BREAST LIMITED: CPT

## 2022-12-27 DIAGNOSIS — M54.14 THORACIC RADICULOPATHY: ICD-10-CM

## 2022-12-27 DIAGNOSIS — E03.9 ACQUIRED HYPOTHYROIDISM: ICD-10-CM

## 2022-12-27 RX ORDER — CHLORTHALIDONE 50 MG/1
TABLET ORAL
Qty: 90 TABLET | Refills: 0 | Status: SHIPPED | OUTPATIENT
Start: 2022-12-27

## 2022-12-27 RX ORDER — GABAPENTIN 100 MG/1
CAPSULE ORAL
Qty: 90 CAPSULE | Refills: 0 | Status: SHIPPED | OUTPATIENT
Start: 2022-12-27

## 2022-12-27 RX ORDER — ROPINIROLE 1 MG/1
TABLET, FILM COATED ORAL
Qty: 60 TABLET | Refills: 0 | Status: SHIPPED | OUTPATIENT
Start: 2022-12-27

## 2022-12-27 RX ORDER — LEVOTHYROXINE SODIUM 112 UG/1
TABLET ORAL
Qty: 90 TABLET | Refills: 0 | Status: SHIPPED | OUTPATIENT
Start: 2022-12-27

## 2023-01-18 ENCOUNTER — TRANSCRIBE ORDER (OUTPATIENT)
Dept: SCHEDULING | Age: 73
End: 2023-01-18

## 2023-01-18 DIAGNOSIS — Z12.31 BREAST CANCER SCREENING BY MAMMOGRAM: Primary | ICD-10-CM

## 2023-01-29 ENCOUNTER — PATIENT MESSAGE (OUTPATIENT)
Dept: INTERNAL MEDICINE CLINIC | Age: 73
End: 2023-01-29

## 2023-01-30 RX ORDER — ROPINIROLE 1 MG/1
1 TABLET, FILM COATED ORAL 2 TIMES DAILY
Qty: 60 TABLET | Refills: 3 | Status: SHIPPED | OUTPATIENT
Start: 2023-01-30

## 2023-02-14 RX ORDER — FENOFIBRATE 134 MG/1
CAPSULE ORAL
Qty: 90 CAPSULE | Refills: 0 | Status: SHIPPED | OUTPATIENT
Start: 2023-02-14

## 2023-02-14 RX ORDER — METFORMIN HYDROCHLORIDE 500 MG/1
TABLET, EXTENDED RELEASE ORAL
Qty: 90 TABLET | Refills: 0 | Status: SHIPPED | OUTPATIENT
Start: 2023-02-14

## 2023-02-14 NOTE — TELEPHONE ENCOUNTER
Chief Complaint   Patient presents with    Medication Refill     Last Appointment with Dr. Fran Alberto:  7/6/2022  Future Appointments   Date Time Provider Sandra Florez   11/13/2023  9:00 AM [facility] [address]

## 2023-02-28 DIAGNOSIS — M54.14 THORACIC RADICULOPATHY: ICD-10-CM

## 2023-03-01 RX ORDER — GABAPENTIN 100 MG/1
CAPSULE ORAL
Qty: 90 CAPSULE | Refills: 0 | Status: SHIPPED | OUTPATIENT
Start: 2023-03-01

## 2023-03-15 DIAGNOSIS — E03.9 ACQUIRED HYPOTHYROIDISM: ICD-10-CM

## 2023-03-15 RX ORDER — LEVOTHYROXINE SODIUM 112 UG/1
TABLET ORAL
Qty: 90 TABLET | Refills: 0 | Status: SHIPPED | OUTPATIENT
Start: 2023-03-15

## 2023-04-22 DIAGNOSIS — Z12.31 BREAST CANCER SCREENING BY MAMMOGRAM: Primary | ICD-10-CM

## 2023-05-11 DIAGNOSIS — G57.93 UNSPECIFIED MONONEUROPATHY OF BILATERAL LOWER LIMBS: Primary | ICD-10-CM

## 2023-05-12 RX ORDER — ROPINIROLE 1 MG/1
TABLET, FILM COATED ORAL
Qty: 60 TABLET | Refills: 1 | Status: SHIPPED | OUTPATIENT
Start: 2023-05-12

## 2023-05-12 RX ORDER — CHLORTHALIDONE 50 MG/1
TABLET ORAL
Qty: 90 TABLET | Refills: 1 | Status: SHIPPED | OUTPATIENT
Start: 2023-05-12

## 2023-05-12 RX ORDER — FENOFIBRATE 134 MG/1
CAPSULE ORAL
Qty: 90 CAPSULE | Refills: 1 | Status: SHIPPED | OUTPATIENT
Start: 2023-05-12

## 2023-05-12 RX ORDER — METFORMIN HYDROCHLORIDE 500 MG/1
TABLET, EXTENDED RELEASE ORAL
Qty: 90 TABLET | Refills: 1 | Status: SHIPPED | OUTPATIENT
Start: 2023-05-12

## 2023-05-12 RX ORDER — CLONIDINE HYDROCHLORIDE 0.2 MG/1
TABLET ORAL
Qty: 180 TABLET | Refills: 1 | Status: SHIPPED | OUTPATIENT
Start: 2023-05-12

## 2023-05-12 NOTE — TELEPHONE ENCOUNTER
Refills processed by verbal order readback for 90 days plus 1 refill from [address] [facility].

## 2023-05-14 RX ORDER — GABAPENTIN 100 MG/1
CAPSULE ORAL
Qty: 90 CAPSULE | Refills: 0 | Status: SHIPPED | OUTPATIENT
Start: 2023-05-14 | End: 2023-06-13

## 2023-05-30 RX ORDER — CONJUGATED ESTROGENS 0.62 MG/1
TABLET, FILM COATED ORAL
Qty: 90 TABLET | Refills: 0 | Status: SHIPPED | OUTPATIENT
Start: 2023-05-30

## 2023-07-04 RX ORDER — ROPINIROLE 1 MG/1
TABLET, FILM COATED ORAL
Qty: 60 TABLET | Refills: 0 | Status: SHIPPED | OUTPATIENT
Start: 2023-07-04

## 2023-08-02 DIAGNOSIS — G57.93 UNSPECIFIED MONONEUROPATHY OF BILATERAL LOWER LIMBS: ICD-10-CM

## 2023-08-02 RX ORDER — GABAPENTIN 100 MG/1
CAPSULE ORAL
Qty: 90 CAPSULE | Refills: 0 | Status: SHIPPED | OUTPATIENT
Start: 2023-08-02 | End: 2023-09-01

## 2023-08-02 RX ORDER — LEVOTHYROXINE SODIUM 112 UG/1
TABLET ORAL
Qty: 90 TABLET | Refills: 0 | Status: SHIPPED | OUTPATIENT
Start: 2023-08-02

## 2023-08-03 ENCOUNTER — TELEPHONE (OUTPATIENT)
Age: 73
End: 2023-08-03

## 2023-08-03 DIAGNOSIS — G57.93 UNSPECIFIED MONONEUROPATHY OF BILATERAL LOWER LIMBS: ICD-10-CM

## 2023-08-03 NOTE — TELEPHONE ENCOUNTER
Medication Refill Request    Jessica Lin is requesting a refill of the following medication(s):   Levothyroxin 112mcg  Gabapentin 100mg  Please send refill to:      [address] [place] [address] [phone] Bethany Hernandez [phone]  [facility] [place] [address]  Phone: [phone] Fax: [phone]

## 2023-08-09 ENCOUNTER — HOSPITAL ENCOUNTER (OUTPATIENT)
Facility: HOSPITAL | Age: 73
Discharge: HOME | End: 2023-08-12
Attending: ORTHOPAEDIC SURGERY
Payer: MEDICARE

## 2023-08-09 DIAGNOSIS — M19.012 PRIMARY OSTEOARTHRITIS OF LEFT SHOULDER: ICD-10-CM

## 2023-08-09 PROCEDURE — 73221 MRI JOINT UPR EXTREM W/O DYE: CPT

## 2023-08-10 RX ORDER — CONJUGATED ESTROGENS 0.62 MG/1
TABLET, FILM COATED ORAL
Qty: 90 TABLET | Refills: 0 | Status: SHIPPED | OUTPATIENT
Start: 2023-08-10

## 2023-08-23 RX ORDER — ROPINIROLE 1 MG/1
TABLET, FILM COATED ORAL
Qty: 60 TABLET | Refills: 0 | Status: SHIPPED | OUTPATIENT
Start: 2023-08-23

## 2023-09-20 RX ORDER — ROPINIROLE 1 MG/1
TABLET, FILM COATED ORAL
Qty: 60 TABLET | Refills: 0 | Status: SHIPPED | OUTPATIENT
Start: 2023-09-20

## 2023-10-15 RX ORDER — ROPINIROLE 1 MG/1
TABLET, FILM COATED ORAL
Qty: 60 TABLET | Refills: 0 | Status: SHIPPED | OUTPATIENT
Start: 2023-10-15

## 2023-11-01 RX ORDER — METFORMIN HYDROCHLORIDE 500 MG/1
TABLET, EXTENDED RELEASE ORAL
Qty: 90 TABLET | Refills: 0 | Status: SHIPPED | OUTPATIENT
Start: 2023-11-01

## 2023-11-01 RX ORDER — FENOFIBRATE 134 MG/1
CAPSULE ORAL
Qty: 90 CAPSULE | Refills: 0 | Status: SHIPPED | OUTPATIENT
Start: 2023-11-01

## 2023-11-13 ENCOUNTER — HOSPITAL ENCOUNTER (OUTPATIENT)
Facility: HOSPITAL | Age: 73
Discharge: HOME | End: 2023-11-16
Payer: MEDICARE

## 2023-11-13 VITALS — HEIGHT: 61 IN | WEIGHT: 172 LBS | BODY MASS INDEX: 32.47 KG/M2

## 2023-11-13 DIAGNOSIS — Z12.31 BREAST CANCER SCREENING BY MAMMOGRAM: ICD-10-CM

## 2023-11-13 PROCEDURE — 77063 BREAST TOMOSYNTHESIS BI: CPT

## 2023-11-16 DIAGNOSIS — G57.93 UNSPECIFIED MONONEUROPATHY OF BILATERAL LOWER LIMBS: ICD-10-CM

## 2023-11-17 RX ORDER — LEVOTHYROXINE SODIUM 112 UG/1
TABLET ORAL
Qty: 90 TABLET | Refills: 0 | Status: SHIPPED | OUTPATIENT
Start: 2023-11-17

## 2023-11-17 RX ORDER — ROPINIROLE 1 MG/1
TABLET, FILM COATED ORAL
Qty: 60 TABLET | Refills: 0 | Status: SHIPPED | OUTPATIENT
Start: 2023-11-17

## 2023-11-17 RX ORDER — GABAPENTIN 100 MG/1
CAPSULE ORAL
Qty: 90 CAPSULE | Refills: 0 | Status: SHIPPED | OUTPATIENT
Start: 2023-11-17 | End: 2023-12-17

## 2023-11-17 RX ORDER — CHLORTHALIDONE 50 MG/1
TABLET ORAL
Qty: 90 TABLET | Refills: 0 | Status: SHIPPED | OUTPATIENT
Start: 2023-11-17

## 2023-11-19 RX ORDER — CONJUGATED ESTROGENS 0.62 MG/1
TABLET, FILM COATED ORAL
Qty: 90 TABLET | Refills: 0 | Status: SHIPPED | OUTPATIENT
Start: 2023-11-19

## 2023-11-21 ENCOUNTER — OFFICE VISIT (OUTPATIENT)
Age: 73
End: 2023-11-21
Payer: MEDICARE

## 2023-11-21 VITALS
RESPIRATION RATE: 15 BRPM | SYSTOLIC BLOOD PRESSURE: 134 MMHG | HEART RATE: 79 BPM | OXYGEN SATURATION: 98 % | TEMPERATURE: 98.2 F | HEIGHT: 61 IN | WEIGHT: 178 LBS | DIASTOLIC BLOOD PRESSURE: 68 MMHG | BODY MASS INDEX: 33.61 KG/M2

## 2023-11-21 DIAGNOSIS — R73.01 IMPAIRED FASTING GLUCOSE: ICD-10-CM

## 2023-11-21 DIAGNOSIS — E78.2 MIXED HYPERLIPIDEMIA: ICD-10-CM

## 2023-11-21 DIAGNOSIS — E03.9 ACQUIRED HYPOTHYROIDISM: ICD-10-CM

## 2023-11-21 DIAGNOSIS — I10 ESSENTIAL (PRIMARY) HYPERTENSION: ICD-10-CM

## 2023-11-21 DIAGNOSIS — Z00.00 MEDICARE ANNUAL WELLNESS VISIT, SUBSEQUENT: Primary | ICD-10-CM

## 2023-11-21 DIAGNOSIS — M71.22 BAKER'S CYST OF KNEE, LEFT: ICD-10-CM

## 2023-11-21 PROCEDURE — G8417 CALC BMI ABV UP PARAM F/U: HCPCS | Performed by: INTERNAL MEDICINE

## 2023-11-21 PROCEDURE — G0439 PPPS, SUBSEQ VISIT: HCPCS | Performed by: INTERNAL MEDICINE

## 2023-11-21 PROCEDURE — 99214 OFFICE O/P EST MOD 30 MIN: CPT | Performed by: INTERNAL MEDICINE

## 2023-11-21 PROCEDURE — 1036F TOBACCO NON-USER: CPT | Performed by: INTERNAL MEDICINE

## 2023-11-21 PROCEDURE — G8484 FLU IMMUNIZE NO ADMIN: HCPCS | Performed by: INTERNAL MEDICINE

## 2023-11-21 PROCEDURE — 3017F COLORECTAL CA SCREEN DOC REV: CPT | Performed by: INTERNAL MEDICINE

## 2023-11-21 PROCEDURE — G8427 DOCREV CUR MEDS BY ELIG CLIN: HCPCS | Performed by: INTERNAL MEDICINE

## 2023-11-21 PROCEDURE — 1123F ACP DISCUSS/DSCN MKR DOCD: CPT | Performed by: INTERNAL MEDICINE

## 2023-11-21 PROCEDURE — 3075F SYST BP GE 130 - 139MM HG: CPT | Performed by: INTERNAL MEDICINE

## 2023-11-21 PROCEDURE — G8399 PT W/DXA RESULTS DOCUMENT: HCPCS | Performed by: INTERNAL MEDICINE

## 2023-11-21 PROCEDURE — 1090F PRES/ABSN URINE INCON ASSESS: CPT | Performed by: INTERNAL MEDICINE

## 2023-11-21 PROCEDURE — 3078F DIAST BP <80 MM HG: CPT | Performed by: INTERNAL MEDICINE

## 2023-11-21 SDOH — ECONOMIC STABILITY: FOOD INSECURITY: WITHIN THE PAST 12 MONTHS, YOU WORRIED THAT YOUR FOOD WOULD RUN OUT BEFORE YOU GOT MONEY TO BUY MORE.: NEVER TRUE

## 2023-11-21 SDOH — ECONOMIC STABILITY: FOOD INSECURITY: WITHIN THE PAST 12 MONTHS, THE FOOD YOU BOUGHT JUST DIDN'T LAST AND YOU DIDN'T HAVE MONEY TO GET MORE.: NEVER TRUE

## 2023-11-21 SDOH — ECONOMIC STABILITY: INCOME INSECURITY: HOW HARD IS IT FOR YOU TO PAY FOR THE VERY BASICS LIKE FOOD, HOUSING, MEDICAL CARE, AND HEATING?: NOT HARD AT ALL

## 2023-11-21 SDOH — ECONOMIC STABILITY: HOUSING INSECURITY
IN THE LAST 12 MONTHS, WAS THERE A TIME WHEN YOU DID NOT HAVE A STEADY PLACE TO SLEEP OR SLEPT IN A SHELTER (INCLUDING NOW)?: NO

## 2023-11-21 ASSESSMENT — PATIENT HEALTH QUESTIONNAIRE - PHQ9
SUM OF ALL RESPONSES TO PHQ9 QUESTIONS 1 & 2: 0
2. FEELING DOWN, DEPRESSED OR HOPELESS: 0
1. LITTLE INTEREST OR PLEASURE IN DOING THINGS: 0
SUM OF ALL RESPONSES TO PHQ QUESTIONS 1-9: 0

## 2023-11-21 ASSESSMENT — LIFESTYLE VARIABLES
HOW MANY STANDARD DRINKS CONTAINING ALCOHOL DO YOU HAVE ON A TYPICAL DAY: 1 OR 2
HOW OFTEN DO YOU HAVE A DRINK CONTAINING ALCOHOL: MONTHLY OR LESS

## 2023-11-21 NOTE — PROGRESS NOTES
Medicare Annual Wellness Visit    Sheryle Bruckner is here for Medicare AWV    Assessment & Plan   Medicare annual wellness visit, subsequent  Mixed hyperlipidemia-LDL goal of 100. Tolerating her current meds well. We will adjust based on those findings. -     Comprehensive Metabolic Panel; Future  -     CBC with Auto Differential; Future  -     Lipid Panel; Future  Essential (primary) hypertension-blood pressure elevated initially. Repeat testing was normal.  We will continue to monitor at home. Impaired fasting glucose-repeat A1c. Has lost weight with metformin and will continue that. -     Hemoglobin A1C; Future  Acquired hypothyroidism-appears euthyroid. Continue current medications and check levels. -     TSH; Future  -     T4, Free; Future  Shoulder degenerative [id number] with orthopedics. We discussed avoiding heavy lifting overhead. Continue stretching. Recommendations for Preventive Services Due: see orders and patient instructions/AVS.  Recommended screening schedule for the next 5-10 years is provided to the patient in written form: see Patient Instructions/AVS.     Return in 1 year (on 11/21/2024). Subjective   Presents for a wellness exam as well as a follow-up. Has been feeling reasonably well. She has been stressed with taking care of her . No headaches or dizziness. No chest pains or shortness of breath. No cough or wheeze. No change in bowel or bladder habits. She just had her mammogram and it was normal.  She is also up-to-date on visits with the dermatologist as well and no skin issues that are new. Patient's complete Health Risk Assessment and screening values have been reviewed and are found in Flowsheets. The following problems were reviewed today and where indicated follow up appointments were made and/or referrals ordered.     Positive Risk Factor Screenings with Interventions:                 Weight and Activity:  Physical Activity: Not on file

## 2023-11-27 DIAGNOSIS — E03.9 ACQUIRED HYPOTHYROIDISM: ICD-10-CM

## 2023-11-27 DIAGNOSIS — R73.01 IMPAIRED FASTING GLUCOSE: ICD-10-CM

## 2023-11-27 DIAGNOSIS — E78.2 MIXED HYPERLIPIDEMIA: ICD-10-CM

## 2023-11-27 LAB
ALBUMIN SERPL-MCNC: 3.9 G/DL (ref 3.5–5)
ALBUMIN/GLOB SERPL: 1.3 (ref 1.1–2.2)
ALP SERPL-CCNC: 46 U/L (ref 45–117)
ALT SERPL-CCNC: 21 U/L (ref 12–78)
ANION GAP SERPL CALC-SCNC: 5 MMOL/L (ref 5–15)
AST SERPL-CCNC: 18 U/L (ref 15–37)
BASOPHILS # BLD: 0.1 K/UL (ref 0–0.1)
BASOPHILS NFR BLD: 1 % (ref 0–1)
BILIRUB SERPL-MCNC: 0.4 MG/DL (ref 0.2–1)
BUN SERPL-MCNC: 22 MG/DL (ref 6–20)
BUN/CREAT SERPL: 24 (ref 12–20)
CALCIUM SERPL-MCNC: 9 MG/DL (ref 8.5–10.1)
CHLORIDE SERPL-SCNC: 107 MMOL/L (ref 97–108)
CHOLEST SERPL-MCNC: 185 MG/DL
CO2 SERPL-SCNC: 27 MMOL/L (ref 21–32)
CREAT SERPL-MCNC: 0.9 MG/DL (ref 0.55–1.02)
DIFFERENTIAL METHOD BLD: NORMAL
EOSINOPHIL # BLD: 0.2 K/UL (ref 0–0.4)
EOSINOPHIL NFR BLD: 3 % (ref 0–7)
ERYTHROCYTE [DISTWIDTH] IN BLOOD BY AUTOMATED COUNT: 13.6 % (ref 11.5–14.5)
EST. AVERAGE GLUCOSE BLD GHB EST-MCNC: 137 MG/DL
GLOBULIN SER CALC-MCNC: 3 G/DL (ref 2–4)
GLUCOSE SERPL-MCNC: 123 MG/DL (ref 65–100)
HBA1C MFR BLD: 6.4 % (ref 4–5.6)
HCT VFR BLD AUTO: 38.2 % (ref 35–47)
HDLC SERPL-MCNC: 55 MG/DL
HDLC SERPL: 3.4 (ref 0–5)
HGB BLD-MCNC: 12.4 G/DL (ref 11.5–16)
IMM GRANULOCYTES # BLD AUTO: 0 K/UL (ref 0–0.04)
IMM GRANULOCYTES NFR BLD AUTO: 0 % (ref 0–0.5)
LDLC SERPL CALC-MCNC: 79.8 MG/DL (ref 0–100)
LYMPHOCYTES # BLD: 3.4 K/UL (ref 0.8–3.5)
LYMPHOCYTES NFR BLD: 46 % (ref 12–49)
MCH RBC QN AUTO: 27.9 PG (ref 26–34)
MCHC RBC AUTO-ENTMCNC: 32.5 G/DL (ref 30–36.5)
MCV RBC AUTO: 85.8 FL (ref 80–99)
MONOCYTES # BLD: 0.6 K/UL (ref 0–1)
MONOCYTES NFR BLD: 8 % (ref 5–13)
NEUTS SEG # BLD: 3.2 K/UL (ref 1.8–8)
NEUTS SEG NFR BLD: 42 % (ref 32–75)
NRBC # BLD: 0 K/UL (ref 0–0.01)
NRBC BLD-RTO: 0 PER 100 WBC
PLATELET # BLD AUTO: 312 K/UL (ref 150–400)
PMV BLD AUTO: 11.2 FL (ref 8.9–12.9)
POTASSIUM SERPL-SCNC: 3.4 MMOL/L (ref 3.5–5.1)
PROT SERPL-MCNC: 6.9 G/DL (ref 6.4–8.2)
RBC # BLD AUTO: 4.45 M/UL (ref 3.8–5.2)
SODIUM SERPL-SCNC: 139 MMOL/L (ref 136–145)
T4 FREE SERPL-MCNC: 1.2 NG/DL (ref 0.8–1.5)
TRIGL SERPL-MCNC: 251 MG/DL
TSH SERPL DL<=0.05 MIU/L-ACNC: 5.29 UIU/ML (ref 0.36–3.74)
VLDLC SERPL CALC-MCNC: 50.2 MG/DL
WBC # BLD AUTO: 7.4 K/UL (ref 3.6–11)

## 2023-11-28 ENCOUNTER — TELEPHONE (OUTPATIENT)
Age: 73
End: 2023-11-28

## 2023-11-28 DIAGNOSIS — E03.9 ACQUIRED HYPOTHYROIDISM: Primary | ICD-10-CM

## 2023-11-28 RX ORDER — LEVOTHYROXINE SODIUM 0.12 MG/1
125 TABLET ORAL DAILY
Qty: 90 TABLET | Refills: 0 | Status: SHIPPED | OUTPATIENT
Start: 2023-11-28

## 2023-11-28 NOTE — TELEPHONE ENCOUNTER
Nereida Humphrey MD  [facility] Team One  Increase synthroid to 125 and watch diet for sugars. TSH and free t4 in 3 months.

## 2023-11-28 NOTE — TELEPHONE ENCOUNTER
Reason for call:  Spoke with pt. Pt returning nurse phone call.  Pt requested a call back     Is this a new problem: Yes    Date of last appointment:  11/21/2023     Can we respond via Suitey: No    Best call back number: Angelo Chance

## 2023-11-28 NOTE — TELEPHONE ENCOUNTER
Spoke with patient and notified of provider result note, new rx sent to [facility] per pt request, she will monitor diet for her sugars and return to [facility]'s in 3 months for repeat thyroid testing. Orders Placed This Encounter    TSH     Standing Status:   Future     Standing Expiration Date:   11/28/2024    T4, Free     Standing Status:   Future     Standing Expiration Date:   11/28/2024    levothyroxine (SYNTHROID) 125 MCG tablet     Sig: Take 1 tablet by mouth daily     Dispense:  90 tablet     Refill:  0       The above orders were approved via [facility] per Dr. Estefany Darby, III.

## 2024-01-10 DIAGNOSIS — E03.9 ACQUIRED HYPOTHYROIDISM: Primary | ICD-10-CM

## 2024-01-10 DIAGNOSIS — I10 ESSENTIAL (PRIMARY) HYPERTENSION: ICD-10-CM

## 2024-01-10 DIAGNOSIS — R73.01 IMPAIRED FASTING GLUCOSE: ICD-10-CM

## 2024-01-10 RX ORDER — TRIAMCINOLONE ACETONIDE 1 MG/G
CREAM TOPICAL 2 TIMES DAILY
Qty: 80 G | Refills: 1 | Status: SHIPPED | OUTPATIENT
Start: 2024-01-10

## 2024-01-22 ENCOUNTER — TELEPHONE (OUTPATIENT)
Age: 74
End: 2024-01-22

## 2024-01-22 DIAGNOSIS — G57.93 UNSPECIFIED MONONEUROPATHY OF BILATERAL LOWER LIMBS: ICD-10-CM

## 2024-01-22 NOTE — TELEPHONE ENCOUNTER
----- Message from Leticia Madison sent at 1/22/2024  9:45 AM EST -----  Subject: Message to Provider    QUESTIONS  Information for Provider? Patient stated she received a [employer] message   that she is due for her Hepatitis B Inj, but when she clicks on the   notification it states it is not due until 2033. Please call patient to   advise if she needs to get that done and if so can it be done in the   office.   ---------------------------------------------------------------------------  --------------  CALL BACK INFO  [phone]; OK to leave message on voicemail  ---------------------------------------------------------------------------  --------------  SCRIPT ANSWERS  Relationship to Patient? Self

## 2024-01-23 RX ORDER — ROPINIROLE 1 MG/1
TABLET, FILM COATED ORAL
Qty: 60 TABLET | Refills: 0 | Status: SHIPPED | OUTPATIENT
Start: 2024-01-23

## 2024-01-23 RX ORDER — GABAPENTIN 100 MG/1
CAPSULE ORAL
Qty: 90 CAPSULE | Refills: 0 | Status: SHIPPED | OUTPATIENT
Start: 2024-01-23 | End: 2024-02-22

## 2024-01-23 NOTE — TELEPHONE ENCOUNTER
Chief Complaint   Patient presents with    Medication Refill     Last Appointment with Dr. Vivek Gilbert:  11/21/2023   Future Appointments   Date Time Provider Department Center   11/26/2024  8:30 AM Vivek Gilbert MD [facility]

## 2024-02-12 RX ORDER — CONJUGATED ESTROGENS 0.62 MG/1
TABLET, FILM COATED ORAL
Qty: 90 TABLET | Refills: 0 | Status: SHIPPED | OUTPATIENT
Start: 2024-02-12

## 2024-02-12 RX ORDER — CLONIDINE HYDROCHLORIDE 0.2 MG/1
TABLET ORAL
Qty: 180 TABLET | Refills: 0 | Status: SHIPPED | OUTPATIENT
Start: 2024-02-12

## 2024-02-12 RX ORDER — LEVOTHYROXINE SODIUM 0.12 MG/1
125 TABLET ORAL DAILY
Qty: 90 TABLET | Refills: 0 | Status: SHIPPED | OUTPATIENT
Start: 2024-02-12

## 2024-02-16 RX ORDER — FENOFIBRATE 134 MG/1
134 CAPSULE ORAL
Qty: 90 CAPSULE | Refills: 2 | Status: SHIPPED | OUTPATIENT
Start: 2024-02-16

## 2024-02-16 RX ORDER — METFORMIN HYDROCHLORIDE 500 MG/1
TABLET, EXTENDED RELEASE ORAL
Qty: 90 TABLET | Refills: 0 | Status: SHIPPED | OUTPATIENT
Start: 2024-02-16

## 2024-02-20 RX ORDER — CHLORTHALIDONE 50 MG/1
TABLET ORAL
Qty: 90 TABLET | Refills: 0 | Status: SHIPPED | OUTPATIENT
Start: 2024-02-20

## 2024-02-21 RX ORDER — ROPINIROLE 1 MG/1
TABLET, FILM COATED ORAL
Qty: 60 TABLET | Refills: 0 | Status: SHIPPED | OUTPATIENT
Start: 2024-02-21

## 2024-02-26 DIAGNOSIS — I10 ESSENTIAL (PRIMARY) HYPERTENSION: ICD-10-CM

## 2024-02-26 DIAGNOSIS — E03.9 ACQUIRED HYPOTHYROIDISM: ICD-10-CM

## 2024-02-26 DIAGNOSIS — R73.01 IMPAIRED FASTING GLUCOSE: ICD-10-CM

## 2024-02-26 LAB
ANION GAP SERPL CALC-SCNC: 8 MMOL/L (ref 5–15)
BUN SERPL-MCNC: 28 MG/DL (ref 6–20)
BUN/CREAT SERPL: 28 (ref 12–20)
CALCIUM SERPL-MCNC: 9.4 MG/DL (ref 8.5–10.1)
CHLORIDE SERPL-SCNC: 106 MMOL/L (ref 97–108)
CO2 SERPL-SCNC: 26 MMOL/L (ref 21–32)
CREAT SERPL-MCNC: 0.99 MG/DL (ref 0.55–1.02)
GLUCOSE SERPL-MCNC: 114 MG/DL (ref 65–100)
POTASSIUM SERPL-SCNC: 3.8 MMOL/L (ref 3.5–5.1)
SODIUM SERPL-SCNC: 140 MMOL/L (ref 136–145)
T4 FREE SERPL-MCNC: 1.2 NG/DL (ref 0.8–1.5)
TSH SERPL DL<=0.05 MIU/L-ACNC: 1.85 UIU/ML (ref 0.36–3.74)

## 2024-03-17 RX ORDER — ROPINIROLE 1 MG/1
TABLET, FILM COATED ORAL
Qty: 60 TABLET | Refills: 0 | Status: SHIPPED | OUTPATIENT
Start: 2024-03-17

## 2024-04-05 DIAGNOSIS — G57.93 UNSPECIFIED MONONEUROPATHY OF BILATERAL LOWER LIMBS: ICD-10-CM

## 2024-04-08 RX ORDER — GABAPENTIN 100 MG/1
CAPSULE ORAL
Qty: 90 CAPSULE | Refills: 0 | Status: SHIPPED | OUTPATIENT
Start: 2024-04-08 | End: 2024-05-08

## 2024-04-21 RX ORDER — ROPINIROLE 1 MG/1
TABLET, FILM COATED ORAL
Qty: 60 TABLET | Refills: 0 | Status: SHIPPED | OUTPATIENT
Start: 2024-04-21

## 2024-05-12 RX ORDER — CHLORTHALIDONE 50 MG/1
TABLET ORAL
Qty: 90 TABLET | Refills: 0 | Status: SHIPPED | OUTPATIENT
Start: 2024-05-12

## 2024-05-12 RX ORDER — ROPINIROLE 1 MG/1
TABLET, FILM COATED ORAL
Qty: 60 TABLET | Refills: 0 | Status: SHIPPED | OUTPATIENT
Start: 2024-05-12

## 2024-05-12 RX ORDER — CONJUGATED ESTROGENS 0.62 MG/1
TABLET, FILM COATED ORAL
Qty: 90 TABLET | Refills: 0 | Status: SHIPPED | OUTPATIENT
Start: 2024-05-12

## 2024-05-12 RX ORDER — LEVOTHYROXINE SODIUM 0.12 MG/1
125 TABLET ORAL DAILY
Qty: 90 TABLET | Refills: 0 | Status: SHIPPED | OUTPATIENT
Start: 2024-05-12

## 2024-05-12 RX ORDER — METFORMIN HYDROCHLORIDE 500 MG/1
TABLET, EXTENDED RELEASE ORAL
Qty: 90 TABLET | Refills: 0 | Status: SHIPPED | OUTPATIENT
Start: 2024-05-12

## 2024-05-22 ENCOUNTER — OFFICE VISIT (OUTPATIENT)
Age: 74
End: 2024-05-22
Payer: MEDICARE

## 2024-05-22 VITALS
BODY MASS INDEX: 33.46 KG/M2 | HEART RATE: 68 BPM | DIASTOLIC BLOOD PRESSURE: 82 MMHG | HEIGHT: 61 IN | RESPIRATION RATE: 15 BRPM | WEIGHT: 177.2 LBS | OXYGEN SATURATION: 97 % | TEMPERATURE: 98.2 F | SYSTOLIC BLOOD PRESSURE: 140 MMHG

## 2024-05-22 DIAGNOSIS — I10 ESSENTIAL (PRIMARY) HYPERTENSION: ICD-10-CM

## 2024-05-22 DIAGNOSIS — R55 SYNCOPE, UNSPECIFIED SYNCOPE TYPE: Primary | ICD-10-CM

## 2024-05-22 DIAGNOSIS — E87.6 HYPOKALEMIA: ICD-10-CM

## 2024-05-22 DIAGNOSIS — R73.01 IMPAIRED FASTING GLUCOSE: ICD-10-CM

## 2024-05-22 PROCEDURE — G8399 PT W/DXA RESULTS DOCUMENT: HCPCS | Performed by: INTERNAL MEDICINE

## 2024-05-22 PROCEDURE — 99214 OFFICE O/P EST MOD 30 MIN: CPT | Performed by: INTERNAL MEDICINE

## 2024-05-22 PROCEDURE — 1036F TOBACCO NON-USER: CPT | Performed by: INTERNAL MEDICINE

## 2024-05-22 PROCEDURE — G8427 DOCREV CUR MEDS BY ELIG CLIN: HCPCS | Performed by: INTERNAL MEDICINE

## 2024-05-22 PROCEDURE — 3077F SYST BP >= 140 MM HG: CPT | Performed by: INTERNAL MEDICINE

## 2024-05-22 PROCEDURE — 1123F ACP DISCUSS/DSCN MKR DOCD: CPT | Performed by: INTERNAL MEDICINE

## 2024-05-22 PROCEDURE — 3017F COLORECTAL CA SCREEN DOC REV: CPT | Performed by: INTERNAL MEDICINE

## 2024-05-22 PROCEDURE — 3079F DIAST BP 80-89 MM HG: CPT | Performed by: INTERNAL MEDICINE

## 2024-05-22 PROCEDURE — G8417 CALC BMI ABV UP PARAM F/U: HCPCS | Performed by: INTERNAL MEDICINE

## 2024-05-22 PROCEDURE — 1090F PRES/ABSN URINE INCON ASSESS: CPT | Performed by: INTERNAL MEDICINE

## 2024-05-22 RX ORDER — ACETAMINOPHEN 325 MG/1
650 TABLET ORAL EVERY 6 HOURS PRN
COMMUNITY

## 2024-05-22 RX ORDER — VITAMIN E 268 MG
CAPSULE ORAL
COMMUNITY
Start: 1998-06-01

## 2024-05-22 RX ORDER — NICOTINE POLACRILEX 2 MG
GUM BUCCAL
COMMUNITY
Start: 1998-06-01

## 2024-05-22 ASSESSMENT — PATIENT HEALTH QUESTIONNAIRE - PHQ9
SUM OF ALL RESPONSES TO PHQ QUESTIONS 1-9: 0
SUM OF ALL RESPONSES TO PHQ QUESTIONS 1-9: 0
2. FEELING DOWN, DEPRESSED OR HOPELESS: NOT AT ALL
1. LITTLE INTEREST OR PLEASURE IN DOING THINGS: NOT AT ALL
SUM OF ALL RESPONSES TO PHQ9 QUESTIONS 1 & 2: 0
SUM OF ALL RESPONSES TO PHQ QUESTIONS 1-9: 0
SUM OF ALL RESPONSES TO PHQ QUESTIONS 1-9: 0

## 2024-05-22 NOTE — PROGRESS NOTES
HPI:  Kacey Soriano is a 74 y.o. year old female who is here for an emergency room follow-up visit.  Apparently last week she took a CBD gummy for the first time and was sitting on the couch watching TV when she had an episode where her eyes glazed over and her head was moving back-and-forth and she was \"unresponsive\".  She was witnessed by her .  No seizure-like activity otherwise.  No loss of bowel or bladder continence.  The episode lasted about 15 minutes or so and she awakened when she was in the ambulance to go to the hospital.  There a CT scan of the head was normal, chest x-ray normal, a slightly low potassium level and magnesium level, and slightly prolonged QT interval on EKG.  Her blood pressure was elevated while she was there.  She has reduced her clonidine from 1 twice daily to 1 at nighttime only and it has helped with hot flashes and sweats.  She denies any further symptoms.  Some slight headache.  No dizziness.  No nosebleeds.  No chest pains or shortness of breath.  No palpitations.  No cough or wheeze.      Past Medical History:   Diagnosis Date    Arthritis     Cancer (HCC)     skin BASAL ON NOSE    Chronic pain     back - PT    COVID-19 vaccine series completed     MODERNA 21 & 21    Diabetes (HCC)     PRE DIABETIC    Hypercholesterolemia     Hypertension     Hypothyroidism Several years ago-Vladimir Powers    Ill-defined condition     right eye scheduled for cataract removed 2017    Ill-defined condition     N/V    Neuropathy     Restless legs syndrome     Thyroid disease        Past Surgical History:   Procedure Laterality Date    BREAST BIOPSY Left     Surgical biopsy benign Approx     BREAST BIOPSY Left         BREAST BIOPSY Right         BREAST SURGERY  ,3/2007,     breast biopsy - all benign    CATARACT REMOVAL Bilateral 2018     SECTION  78, 83    X2    CHOLECYSTECTOMY  2017    Gallbladder taken out by Dr. Shaw    CHOLECYSTECTOMY,

## 2024-05-23 DIAGNOSIS — E87.6 HYPOKALEMIA: Primary | ICD-10-CM

## 2024-05-23 LAB
ANION GAP SERPL CALC-SCNC: 4 MMOL/L (ref 5–15)
BUN SERPL-MCNC: 26 MG/DL (ref 6–20)
BUN/CREAT SERPL: 26 (ref 12–20)
CALCIUM SERPL-MCNC: 9.9 MG/DL (ref 8.5–10.1)
CHLORIDE SERPL-SCNC: 107 MMOL/L (ref 97–108)
CO2 SERPL-SCNC: 29 MMOL/L (ref 21–32)
CREAT SERPL-MCNC: 1.01 MG/DL (ref 0.55–1.02)
GLUCOSE SERPL-MCNC: 103 MG/DL (ref 65–100)
POTASSIUM SERPL-SCNC: 3.6 MMOL/L (ref 3.5–5.1)
SODIUM SERPL-SCNC: 140 MMOL/L (ref 136–145)

## 2024-06-05 ENCOUNTER — PROCEDURE VISIT (OUTPATIENT)
Age: 74
End: 2024-06-05
Payer: MEDICARE

## 2024-06-05 DIAGNOSIS — R55 SYNCOPE, UNSPECIFIED SYNCOPE TYPE: ICD-10-CM

## 2024-06-05 PROCEDURE — 95819 EEG AWAKE AND ASLEEP: CPT | Performed by: PSYCHIATRY & NEUROLOGY

## 2024-06-11 ENCOUNTER — TELEPHONE (OUTPATIENT)
Age: 74
End: 2024-06-11

## 2024-06-11 NOTE — TELEPHONE ENCOUNTER
Patient is requesting a call regarding her recent EEG at our office. Kacey stated she went on a vacation the very next day after her EEG on the 7th she noticed a round puffy [place] on her right side temple.

## 2024-06-17 ENCOUNTER — PATIENT MESSAGE (OUTPATIENT)
Age: 74
End: 2024-06-17

## 2024-06-17 DIAGNOSIS — E87.6 HYPOKALEMIA: ICD-10-CM

## 2024-06-17 LAB
ANION GAP SERPL CALC-SCNC: 8 MMOL/L (ref 5–15)
BUN SERPL-MCNC: 23 MG/DL (ref 6–20)
BUN/CREAT SERPL: 25 (ref 12–20)
CALCIUM SERPL-MCNC: 9.3 MG/DL (ref 8.5–10.1)
CHLORIDE SERPL-SCNC: 106 MMOL/L (ref 97–108)
CO2 SERPL-SCNC: 25 MMOL/L (ref 21–32)
CREAT SERPL-MCNC: 0.93 MG/DL (ref 0.55–1.02)
GLUCOSE SERPL-MCNC: 114 MG/DL (ref 65–100)
POTASSIUM SERPL-SCNC: 4.1 MMOL/L (ref 3.5–5.1)
SODIUM SERPL-SCNC: 139 MMOL/L (ref 136–145)

## 2024-06-17 RX ORDER — ROPINIROLE 1 MG/1
TABLET, FILM COATED ORAL
Qty: 60 TABLET | Refills: 0 | Status: SHIPPED | OUTPATIENT
Start: 2024-06-17

## 2024-06-18 ENCOUNTER — TELEPHONE (OUTPATIENT)
Age: 74
End: 2024-06-18

## 2024-06-18 NOTE — TELEPHONE ENCOUNTER
Reason for call:  TC from patient. Patient stated that she had  an EEG done on 6/5  [facility] and has not received the results. Pt wanted to see if  could f/u on the getting the results.     Is this a new problem: Yes    Date of last appointment:  5/22/2024     Can we respond via Endoclear: No    Best call back number:     Soriano Kacey J (Self) [phone] (Mobile)

## 2024-06-19 ENCOUNTER — TELEPHONE (OUTPATIENT)
Age: 74
End: 2024-06-19

## 2024-06-19 NOTE — TELEPHONE ENCOUNTER
Contacted [facility]/[place] location, spoke w/ Charu - she will forward message to  requesting pt EEG results be forwarded to PCP or uploaded to pt chart.

## 2024-06-19 NOTE — TELEPHONE ENCOUNTER
[facility] a Medical Records Release form to be faxed to [phone].          [facility]/Neurology Clinic  [phone]

## 2024-06-19 NOTE — TELEPHONE ENCOUNTER
Reached out to the Internal Medicine office and informed staff to send over the Release of Information for the EEG records request. Staff stated will pass on the information to the Nurse to have the form faxed over.

## 2024-06-19 NOTE — PROGRESS NOTES
[facility]     Electroencephalogram Report    Procedure ID: WC  Procedure Date: 06/05/2024   Patient Name: Kacey Soriano YOB: 1950   Procedure Type: Routine Medical Record No: [id number]     INDICATION:  syncope and seizure like spell    STATE:  awake and sleep .    DESCRIPTION OF PROCEDURE: Electrodes were applied in accordance with the international 10-20 system of electrode placement.  EEG was reviewed in both bipolar and referential montages.    Description of Activity:  During wakefulness, there is continuous runs of 9 Hz symmetric posterior alpha rhythm, that attenuate symmetrically with eye opening. Low voltage beta activity occurs symmetrically at the anterior head regions bilaterally.    During drowsiness, there is attenuation of the alpha rhythm and low voltage theta activity occurs bilaterally. Sleep spindles occur and are symmetric.     Intermittent photic stimulation was performed and did not induce posterior driving responses.     No sharp or spike discharges, seizures or epileptiform discharges seen. No focal asymmetry.    Clinical Interpretation:  This EEG, performed during wakefulness and sleep is normal. There is no focal asymmetry, seizures or epileptiform discharges seen.       Medications:  Current Outpatient Medications   Medication Sig    rOPINIRole (REQUIP) 1 MG tablet Take 1 tablet by mouth twice daily    acetaminophen (TYLENOL) 325 MG tablet Take 2 tablets by mouth every 6 hours as needed for Pain    vitamin E 180 MG (400 UNIT) CAPS capsule     Biotin 1 MG CAPS     chlorthalidone (HYGROTEN) 50 MG tablet Take 1 tablet by mouth once daily    levothyroxine (SYNTHROID) 125 MCG tablet Take 1 tablet by mouth once daily    metFORMIN (GLUCOPHAGE-XR) 500 MG extended release tablet TAKE 1 TABLET BY MOUTH ONCE DAILY WITH SUPPER    PREMARIN 0.625 MG tablet Take 1 tablet by mouth once daily    gabapentin (NEURONTIN) 100 MG capsule TAKE 1 CAPSULE BY MOUTH

## 2024-07-16 DIAGNOSIS — G57.93 UNSPECIFIED MONONEUROPATHY OF BILATERAL LOWER LIMBS: ICD-10-CM

## 2024-07-16 RX ORDER — ROPINIROLE 1 MG/1
TABLET, FILM COATED ORAL
Qty: 60 TABLET | Refills: 0 | Status: SHIPPED | OUTPATIENT
Start: 2024-07-16

## 2024-07-16 RX ORDER — GABAPENTIN 100 MG/1
CAPSULE ORAL
Qty: 90 CAPSULE | Refills: 0 | Status: SHIPPED | OUTPATIENT
Start: 2024-07-16 | End: 2024-08-15

## 2024-07-16 NOTE — TELEPHONE ENCOUNTER
Chief Complaint   Patient presents with    Medication Refill     Last Appointment with Dr. Vivek Gilbert:  5/22/2024   Future Appointments   Date Time Provider Department Center   11/14/2024  9:00 AM [facility] 2 [facility]   11/26/2024  8:30 AM Vivek Gilbert MD [facility]

## 2024-07-25 ENCOUNTER — HOSPITAL ENCOUNTER (OUTPATIENT)
Facility: HOSPITAL | Age: 74
Discharge: HOME | End: 2024-07-25
Attending: ORTHOPAEDIC SURGERY
Payer: MEDICARE

## 2024-07-25 DIAGNOSIS — M51.26 HERNIATED LUMBAR DISC WITHOUT MYELOPATHY: ICD-10-CM

## 2024-07-25 DIAGNOSIS — M54.50 LUMBAR PAIN: ICD-10-CM

## 2024-07-25 PROCEDURE — 72148 MRI LUMBAR SPINE W/O DYE: CPT

## 2024-08-06 RX ORDER — CHLORTHALIDONE 50 MG/1
TABLET ORAL
Qty: 90 TABLET | Refills: 0 | Status: SHIPPED | OUTPATIENT
Start: 2024-08-06

## 2024-08-06 RX ORDER — LEVOTHYROXINE SODIUM 0.12 MG/1
125 TABLET ORAL DAILY
Qty: 90 TABLET | Refills: 0 | Status: SHIPPED | OUTPATIENT
Start: 2024-08-06

## 2024-08-13 RX ORDER — ROPINIROLE 1 MG/1
TABLET, FILM COATED ORAL
Qty: 60 TABLET | Refills: 0 | Status: SHIPPED | OUTPATIENT
Start: 2024-08-13

## 2024-08-13 RX ORDER — METFORMIN HYDROCHLORIDE 500 MG/1
TABLET, EXTENDED RELEASE ORAL
Qty: 90 TABLET | Refills: 0 | Status: SHIPPED | OUTPATIENT
Start: 2024-08-13

## 2024-08-13 RX ORDER — CONJUGATED ESTROGENS 0.62 MG/1
TABLET, FILM COATED ORAL
Qty: 90 TABLET | Refills: 0 | Status: SHIPPED | OUTPATIENT
Start: 2024-08-13

## 2024-08-27 RX ORDER — CLONIDINE HYDROCHLORIDE 0.2 MG/1
TABLET ORAL
Qty: 180 TABLET | Refills: 0 | Status: SHIPPED | OUTPATIENT
Start: 2024-08-27

## 2024-09-17 ENCOUNTER — TELEPHONE (OUTPATIENT)
Age: 74
End: 2024-09-17

## 2024-09-17 RX ORDER — ROPINIROLE 1 MG/1
TABLET, FILM COATED ORAL
Qty: 60 TABLET | Refills: 0 | Status: SHIPPED | OUTPATIENT
Start: 2024-09-17

## 2024-10-07 RX ORDER — ROPINIROLE 1 MG/1
TABLET, FILM COATED ORAL
Qty: 60 TABLET | Refills: 2 | Status: SHIPPED | OUTPATIENT
Start: 2024-10-07

## 2024-10-07 NOTE — TELEPHONE ENCOUNTER
Chief Complaint   Patient presents with    Medication Refill     Last Appointment with Dr. Vivek Gilbert:  5/22/2024   Future Appointments   Date Time Provider Department Center   11/14/2024  9:00 AM [facility] 2 [facility]   11/26/2024  8:30 AM Vivek Gilbert MD [facility]

## 2024-11-06 RX ORDER — FENOFIBRATE 134 MG/1
CAPSULE ORAL
Qty: 90 CAPSULE | Refills: 0 | Status: SHIPPED | OUTPATIENT
Start: 2024-11-06

## 2024-11-09 RX ORDER — CONJUGATED ESTROGENS 0.62 MG/1
TABLET, FILM COATED ORAL
Qty: 90 TABLET | Refills: 0 | Status: SHIPPED | OUTPATIENT
Start: 2024-11-09

## 2024-11-09 RX ORDER — LEVOTHYROXINE SODIUM 125 UG/1
125 TABLET ORAL DAILY
Qty: 90 TABLET | Refills: 0 | Status: SHIPPED | OUTPATIENT
Start: 2024-11-09

## 2024-11-09 RX ORDER — METFORMIN HYDROCHLORIDE 500 MG/1
TABLET, EXTENDED RELEASE ORAL
Qty: 90 TABLET | Refills: 0 | Status: SHIPPED | OUTPATIENT
Start: 2024-11-09

## 2024-11-09 RX ORDER — CHLORTHALIDONE 50 MG/1
TABLET ORAL
Qty: 90 TABLET | Refills: 0 | Status: SHIPPED | OUTPATIENT
Start: 2024-11-09

## 2024-11-14 ENCOUNTER — HOSPITAL ENCOUNTER (OUTPATIENT)
Facility: HOSPITAL | Age: 74
Discharge: HOME | End: 2024-11-17
Attending: SURGERY
Payer: MEDICARE

## 2024-11-14 VITALS — WEIGHT: 168 LBS | HEIGHT: 61 IN | BODY MASS INDEX: 31.72 KG/M2

## 2024-11-14 DIAGNOSIS — Z12.31 ENCOUNTER FOR SCREENING MAMMOGRAM FOR HIGH-RISK PATIENT: ICD-10-CM

## 2024-11-14 PROCEDURE — 77063 BREAST TOMOSYNTHESIS BI: CPT

## 2024-11-22 ENCOUNTER — TRANSCRIBE ORDERS (OUTPATIENT)
Facility: HOSPITAL | Age: 74
End: 2024-11-22

## 2024-11-22 DIAGNOSIS — Z12.31 VISIT FOR SCREENING MAMMOGRAM: Primary | ICD-10-CM

## 2024-11-25 SDOH — ECONOMIC STABILITY: FOOD INSECURITY: WITHIN THE PAST 12 MONTHS, THE FOOD YOU BOUGHT JUST DIDN'T LAST AND YOU DIDN'T HAVE MONEY TO GET MORE.: NEVER TRUE

## 2024-11-25 SDOH — HEALTH STABILITY: PHYSICAL HEALTH: ON AVERAGE, HOW MANY DAYS PER WEEK DO YOU ENGAGE IN MODERATE TO STRENUOUS EXERCISE (LIKE A BRISK WALK)?: 5 DAYS

## 2024-11-25 SDOH — ECONOMIC STABILITY: FOOD INSECURITY: WITHIN THE PAST 12 MONTHS, YOU WORRIED THAT YOUR FOOD WOULD RUN OUT BEFORE YOU GOT MONEY TO BUY MORE.: NEVER TRUE

## 2024-11-25 SDOH — ECONOMIC STABILITY: INCOME INSECURITY: HOW HARD IS IT FOR YOU TO PAY FOR THE VERY BASICS LIKE FOOD, HOUSING, MEDICAL CARE, AND HEATING?: NOT HARD AT ALL

## 2024-11-25 SDOH — HEALTH STABILITY: PHYSICAL HEALTH: ON AVERAGE, HOW MANY MINUTES DO YOU ENGAGE IN EXERCISE AT THIS LEVEL?: 20 MIN

## 2024-11-25 SDOH — ECONOMIC STABILITY: TRANSPORTATION INSECURITY
IN THE PAST 12 MONTHS, HAS LACK OF TRANSPORTATION KEPT YOU FROM MEETINGS, WORK, OR FROM GETTING THINGS NEEDED FOR DAILY LIVING?: NO

## 2024-11-25 ASSESSMENT — PATIENT HEALTH QUESTIONNAIRE - PHQ9
SUM OF ALL RESPONSES TO PHQ9 QUESTIONS 1 & 2: 0
SUM OF ALL RESPONSES TO PHQ QUESTIONS 1-9: 0
2. FEELING DOWN, DEPRESSED OR HOPELESS: NOT AT ALL
SUM OF ALL RESPONSES TO PHQ QUESTIONS 1-9: 0
1. LITTLE INTEREST OR PLEASURE IN DOING THINGS: NOT AT ALL

## 2024-11-25 ASSESSMENT — LIFESTYLE VARIABLES
HOW OFTEN DO YOU HAVE A DRINK CONTAINING ALCOHOL: 2
HOW OFTEN DO YOU HAVE A DRINK CONTAINING ALCOHOL: MONTHLY OR LESS
HOW OFTEN DO YOU HAVE SIX OR MORE DRINKS ON ONE OCCASION: 1
HOW MANY STANDARD DRINKS CONTAINING ALCOHOL DO YOU HAVE ON A TYPICAL DAY: 1 OR 2
HOW MANY STANDARD DRINKS CONTAINING ALCOHOL DO YOU HAVE ON A TYPICAL DAY: 1

## 2024-11-26 ENCOUNTER — OFFICE VISIT (OUTPATIENT)
Age: 74
End: 2024-11-26
Payer: MEDICARE

## 2024-11-26 VITALS
TEMPERATURE: 98.3 F | BODY MASS INDEX: 33.61 KG/M2 | HEART RATE: 77 BPM | SYSTOLIC BLOOD PRESSURE: 138 MMHG | HEIGHT: 61 IN | DIASTOLIC BLOOD PRESSURE: 78 MMHG | WEIGHT: 178 LBS | RESPIRATION RATE: 15 BRPM | OXYGEN SATURATION: 95 %

## 2024-11-26 DIAGNOSIS — R73.01 IMPAIRED FASTING GLUCOSE: ICD-10-CM

## 2024-11-26 DIAGNOSIS — E03.9 ACQUIRED HYPOTHYROIDISM: ICD-10-CM

## 2024-11-26 DIAGNOSIS — E87.6 HYPOKALEMIA: ICD-10-CM

## 2024-11-26 DIAGNOSIS — E78.2 MIXED HYPERLIPIDEMIA: ICD-10-CM

## 2024-11-26 DIAGNOSIS — I10 ESSENTIAL (PRIMARY) HYPERTENSION: ICD-10-CM

## 2024-11-26 DIAGNOSIS — Z00.00 MEDICARE ANNUAL WELLNESS VISIT, SUBSEQUENT: Primary | ICD-10-CM

## 2024-11-26 LAB
ALBUMIN SERPL-MCNC: 3.7 G/DL (ref 3.5–5)
ALBUMIN/GLOB SERPL: 1.2 (ref 1.1–2.2)
ALP SERPL-CCNC: 35 U/L (ref 45–117)
ALT SERPL-CCNC: 22 U/L (ref 12–78)
ANION GAP SERPL CALC-SCNC: 7 MMOL/L (ref 2–12)
AST SERPL-CCNC: 14 U/L (ref 15–37)
BASOPHILS # BLD: 0.1 K/UL (ref 0–0.1)
BASOPHILS NFR BLD: 1 % (ref 0–1)
BILIRUB SERPL-MCNC: 0.4 MG/DL (ref 0.2–1)
BUN SERPL-MCNC: 31 MG/DL (ref 6–20)
BUN/CREAT SERPL: 31 (ref 12–20)
CALCIUM SERPL-MCNC: 9.5 MG/DL (ref 8.5–10.1)
CHLORIDE SERPL-SCNC: 107 MMOL/L (ref 97–108)
CHOLEST SERPL-MCNC: 213 MG/DL
CO2 SERPL-SCNC: 26 MMOL/L (ref 21–32)
CREAT SERPL-MCNC: 1.01 MG/DL (ref 0.55–1.02)
DIFFERENTIAL METHOD BLD: NORMAL
EOSINOPHIL # BLD: 0.2 K/UL (ref 0–0.4)
EOSINOPHIL NFR BLD: 3 % (ref 0–7)
ERYTHROCYTE [DISTWIDTH] IN BLOOD BY AUTOMATED COUNT: 13.2 % (ref 11.5–14.5)
EST. AVERAGE GLUCOSE BLD GHB EST-MCNC: 123 MG/DL
GLOBULIN SER CALC-MCNC: 3 G/DL (ref 2–4)
GLUCOSE SERPL-MCNC: 113 MG/DL (ref 65–100)
HBA1C MFR BLD: 5.9 % (ref 4–5.6)
HCT VFR BLD AUTO: 38.6 % (ref 35–47)
HDLC SERPL-MCNC: 73 MG/DL
HDLC SERPL: 2.9 (ref 0–5)
HGB BLD-MCNC: 12.4 G/DL (ref 11.5–16)
IMM GRANULOCYTES # BLD AUTO: 0 K/UL (ref 0–0.04)
IMM GRANULOCYTES NFR BLD AUTO: 0 % (ref 0–0.5)
LDLC SERPL CALC-MCNC: 94.4 MG/DL (ref 0–100)
LYMPHOCYTES # BLD: 1.9 K/UL (ref 0.8–3.5)
LYMPHOCYTES NFR BLD: 30 % (ref 12–49)
MCH RBC QN AUTO: 28.9 PG (ref 26–34)
MCHC RBC AUTO-ENTMCNC: 32.1 G/DL (ref 30–36.5)
MCV RBC AUTO: 90 FL (ref 80–99)
MONOCYTES # BLD: 0.5 K/UL (ref 0–1)
MONOCYTES NFR BLD: 8 % (ref 5–13)
NEUTS SEG # BLD: 3.7 K/UL (ref 1.8–8)
NEUTS SEG NFR BLD: 58 % (ref 32–75)
NRBC # BLD: 0 K/UL (ref 0–0.01)
NRBC BLD-RTO: 0 PER 100 WBC
PLATELET # BLD AUTO: 273 K/UL (ref 150–400)
PMV BLD AUTO: 11 FL (ref 8.9–12.9)
POTASSIUM SERPL-SCNC: 4.3 MMOL/L (ref 3.5–5.1)
PROT SERPL-MCNC: 6.7 G/DL (ref 6.4–8.2)
RBC # BLD AUTO: 4.29 M/UL (ref 3.8–5.2)
SODIUM SERPL-SCNC: 140 MMOL/L (ref 136–145)
T4 FREE SERPL-MCNC: 1.3 NG/DL (ref 0.8–1.5)
TRIGL SERPL-MCNC: 228 MG/DL
TSH SERPL DL<=0.05 MIU/L-ACNC: 1.37 UIU/ML (ref 0.36–3.74)
VLDLC SERPL CALC-MCNC: 45.6 MG/DL
WBC # BLD AUTO: 6.4 K/UL (ref 3.6–11)

## 2024-11-26 PROCEDURE — 99214 OFFICE O/P EST MOD 30 MIN: CPT | Performed by: INTERNAL MEDICINE

## 2024-11-26 RX ORDER — CELECOXIB 200 MG/1
200 CAPSULE ORAL DAILY
COMMUNITY
Start: 2024-11-11

## 2024-11-26 RX ORDER — METAXALONE 800 MG/1
800 TABLET ORAL 3 TIMES DAILY
COMMUNITY
Start: 2024-11-25 | End: 2024-12-05

## 2024-11-26 NOTE — PROGRESS NOTES
Medicare Annual Wellness Visit    Kacey Soriano is here for Medicare AWV and Nutrition Counseling    Assessment & Plan   Medicare annual wellness visit, subsequent  Impaired fasting glucose-very close to diabetes.  Will check labs to be sure this is adequately controlled with metformin alone.  Did discuss also the addition of GLP inhibitors to help with weight loss.  She will consider that based on her lab work.  -     Hemoglobin A1C; Future  Acquired hypothyroidism-appears euthyroid.  Check levels and adjust medications as needed.  -     TSH; Future  -     T4, Free; Future  Hypokalemia-repeat potassium level.  Mixed hyperlipidemia-LDL diet controlled.  Check labs to be sure that is adequate.  -     Comprehensive Metabolic Panel; Future  -     CBC with Auto Differential; Future  -     Lipid Panel; Future  Essential (primary) hypertension-blood pressure initially elevated.  She will get a blood pressure cuff and follow this at home.  Recommendations for Preventive Services Due: see orders and patient instructions/AVS.  Recommended screening schedule for the next 5-10 years is provided to the patient in written form: see Patient Instructions/AVS.     Return in 1 year (on 11/26/2025) for Medicare AWV.     Subjective   Presents for a wellness exam and a follow-up.  She is continuing to have issues with her lower back.  She has been doing physical therapy and also had injections.  That does seem to be getting better.  Denies any headache or dizziness.  Weight continues to be a problem.  She has not been exercising with any regularity.    Patient's complete Health Risk Assessment and screening values have been reviewed and are found in Flowsheets. The following problems were reviewed today and where indicated follow up appointments were made and/or referrals ordered.    Positive Risk Factor Screenings with Interventions:                Abnormal BMI (obese):  Body mass index is 33.63 kg/m². (!)

## 2024-11-26 NOTE — PATIENT INSTRUCTIONS
for Kacey Soriano - 11/26/2024  Medicare offers a range of preventive health benefits. Some of the tests and screenings are paid in full while other may be subject to a deductible, co-insurance, and/or copay.    Some of these benefits include a comprehensive review of your medical history including lifestyle, illnesses that may run in your family, and various assessments and screenings as appropriate.    After reviewing your medical record and screening and assessments performed today your provider may have ordered immunizations, labs, imaging, and/or referrals for you.  A list of these orders (if applicable) as well as your Preventive Care list are included within your After Visit Summary for your review.    Other Preventive Recommendations:    A preventive eye exam performed by an eye specialist is recommended every 1-2 years to screen for glaucoma; cataracts, macular degeneration, and other eye disorders.  A preventive dental visit is recommended every 6 months.  Try to get at least 150 minutes of exercise per week or 10,000 steps per day on a pedometer .  Order or download the FREE \"Exercise & Physical Activity: Your Everyday Guide\" from [employer]. Call 1-679.179.2457 or search [employer] online.  You need 2197-0489 mg of calcium and 7774-8630 IU of vitamin D per day. It is possible to meet your calcium requirement with diet alone, but a vitamin D supplement is usually necessary to meet this goal.  When exposed to the sun, use a sunscreen that protects against both UVA and UVB radiation with an SPF of 30 or greater. Reapply every 2 to 3 hours or after sweating, drying off with a towel, or swimming.  Always wear a seat belt when traveling in a car. Always wear a helmet when riding a bicycle or motorcycle.

## 2024-11-27 RX ORDER — TIRZEPATIDE 2.5 MG/.5ML
2.5 INJECTION, SOLUTION SUBCUTANEOUS
Qty: 2 ML | Refills: 0 | Status: SHIPPED | OUTPATIENT
Start: 2024-11-27

## 2025-01-03 RX ORDER — ROPINIROLE 1 MG/1
TABLET, FILM COATED ORAL
Qty: 60 TABLET | Refills: 0 | Status: SHIPPED | OUTPATIENT
Start: 2025-01-03

## 2025-01-29 ENCOUNTER — HOSPITAL ENCOUNTER (EMERGENCY)
Facility: HOSPITAL | Age: 75
Discharge: HOME | End: 2025-01-29

## 2025-01-29 ENCOUNTER — TELEPHONE (OUTPATIENT)
Age: 75
End: 2025-01-29

## 2025-01-29 NOTE — TELEPHONE ENCOUNTER
Patient called to advise Sirena that she is going to [facility] instead of [place].    Kacey Bo - [phone]

## 2025-01-29 NOTE — TELEPHONE ENCOUNTER
Spoke with patient and notified per PCP to follow what Ortho recommends.  Pt states they cannot see her until next Wednesday, she does not feel [facility] or [facility] will be able to do anything for her.  Pt plans to go to [facility] ED for evaluation.

## 2025-01-29 NOTE — TELEPHONE ENCOUNTER
Patient called for advice.  She went for her 2nd back injection on 1/15 (Dr. Huerta, [facility]).  There was an incident during the procedure where he had to back out twice before being able to do the injection.  Patient has been having trouble with her legs since, and now cannot walk without the use of her 's hand-crutches.  Patient called Dr. Huerta's office and no one can see her until next Weds. Patient states she cannot wait that long, is in too much pain.  They suggested she go to [facility], urgent care or the ER.  Patient states she would just appreciate knowing what Dr. Gilbert thinks she should do.  Please call.    [phone]

## 2025-01-29 NOTE — ED NOTES
Patient walked out door prior to being called back to triage. Patient walked out door before informed refusal can be signed.

## 2025-01-31 RX ORDER — FENOFIBRATE 134 MG/1
CAPSULE ORAL
Qty: 90 CAPSULE | Refills: 0 | Status: SHIPPED | OUTPATIENT
Start: 2025-01-31

## 2025-02-02 RX ORDER — ROPINIROLE 1 MG/1
TABLET, FILM COATED ORAL
Qty: 60 TABLET | Refills: 0 | Status: SHIPPED | OUTPATIENT
Start: 2025-02-02

## 2025-02-18 DIAGNOSIS — E83.52 SERUM CALCIUM ELEVATED: Primary | ICD-10-CM

## 2025-02-18 DIAGNOSIS — R77.9 ELEVATED BLOOD PROTEIN: ICD-10-CM

## 2025-02-24 ENCOUNTER — TELEPHONE (OUTPATIENT)
Age: 75
End: 2025-02-24

## 2025-02-24 NOTE — TELEPHONE ENCOUNTER
Contacted pt about unread MCM below - read to pt and she voiced understanding - she plans to go to lab Wednesday after her f/up with her surgeon's office.      Hi Ms. Soriano,  Dr. Gilbert received a copy of your labs from [facility] - he noted that your calcium and protein levels were elevated (previously normal in November) and would like to repeat these test for further evaluation.  He has placed an order with the lab for you and you can come in at your convenience to have these drawn.  Our lab is open Mon-Friday 7AM-5PM.  Please let us know if you have any questions.  Thank you,  BO Pack      [employer] [place]    [employer] User Last Read On   Kacey Soriano Not Read

## 2025-02-25 SDOH — ECONOMIC STABILITY: FOOD INSECURITY: WITHIN THE PAST 12 MONTHS, THE FOOD YOU BOUGHT JUST DIDN'T LAST AND YOU DIDN'T HAVE MONEY TO GET MORE.: NEVER TRUE

## 2025-02-25 SDOH — ECONOMIC STABILITY: FOOD INSECURITY: WITHIN THE PAST 12 MONTHS, YOU WORRIED THAT YOUR FOOD WOULD RUN OUT BEFORE YOU GOT MONEY TO BUY MORE.: NEVER TRUE

## 2025-02-25 SDOH — ECONOMIC STABILITY: INCOME INSECURITY: IN THE LAST 12 MONTHS, WAS THERE A TIME WHEN YOU WERE NOT ABLE TO PAY THE MORTGAGE OR RENT ON TIME?: NO

## 2025-02-25 SDOH — ECONOMIC STABILITY: TRANSPORTATION INSECURITY
IN THE PAST 12 MONTHS, HAS THE LACK OF TRANSPORTATION KEPT YOU FROM MEDICAL APPOINTMENTS OR FROM GETTING MEDICATIONS?: NO

## 2025-02-26 DIAGNOSIS — E83.52 SERUM CALCIUM ELEVATED: ICD-10-CM

## 2025-02-26 DIAGNOSIS — R77.9 ELEVATED BLOOD PROTEIN: ICD-10-CM

## 2025-02-27 LAB
ALBUMIN SERPL-MCNC: 3.8 G/DL (ref 3.5–5)
ALBUMIN/GLOB SERPL: 1 (ref 1.1–2.2)
ALP SERPL-CCNC: 91 U/L (ref 45–117)
ALT SERPL-CCNC: 21 U/L (ref 12–78)
ANION GAP SERPL CALC-SCNC: 9 MMOL/L (ref 2–12)
AST SERPL-CCNC: 25 U/L (ref 15–37)
BILIRUB SERPL-MCNC: 0.4 MG/DL (ref 0.2–1)
BUN SERPL-MCNC: 21 MG/DL (ref 6–20)
BUN/CREAT SERPL: 18 (ref 12–20)
CALCIUM SERPL-MCNC: 10.3 MG/DL (ref 8.5–10.1)
CALCIUM SERPL-MCNC: 10.3 MG/DL (ref 8.5–10.1)
CHLORIDE SERPL-SCNC: 100 MMOL/L (ref 97–108)
CO2 SERPL-SCNC: 28 MMOL/L (ref 21–32)
CREAT SERPL-MCNC: 1.2 MG/DL (ref 0.55–1.02)
GLOBULIN SER CALC-MCNC: 3.7 G/DL (ref 2–4)
GLUCOSE SERPL-MCNC: 112 MG/DL (ref 65–100)
POTASSIUM SERPL-SCNC: 3.6 MMOL/L (ref 3.5–5.1)
PROT SERPL-MCNC: 7.5 G/DL (ref 6.4–8.2)
PTH-INTACT SERPL-MCNC: 15.2 PG/ML (ref 18.4–88)
SODIUM SERPL-SCNC: 137 MMOL/L (ref 136–145)

## 2025-03-02 RX ORDER — METFORMIN HYDROCHLORIDE 500 MG/1
TABLET, EXTENDED RELEASE ORAL
Qty: 90 TABLET | Refills: 0 | Status: SHIPPED | OUTPATIENT
Start: 2025-03-02

## 2025-03-02 RX ORDER — FENOFIBRATE 134 MG/1
CAPSULE ORAL
Qty: 90 CAPSULE | Refills: 0 | Status: SHIPPED | OUTPATIENT
Start: 2025-03-02

## 2025-03-02 RX ORDER — ROPINIROLE 1 MG/1
TABLET, FILM COATED ORAL
Qty: 60 TABLET | Refills: 0 | Status: SHIPPED | OUTPATIENT
Start: 2025-03-02

## 2025-03-02 RX ORDER — CLONIDINE HYDROCHLORIDE 0.2 MG/1
TABLET ORAL
Qty: 180 TABLET | Refills: 0 | Status: SHIPPED | OUTPATIENT
Start: 2025-03-02

## 2025-03-02 RX ORDER — CONJUGATED ESTROGENS 0.62 MG/1
TABLET, FILM COATED ORAL
Qty: 90 TABLET | Refills: 0 | Status: SHIPPED | OUTPATIENT
Start: 2025-03-02

## 2025-03-03 ENCOUNTER — OFFICE VISIT (OUTPATIENT)
Age: 75
End: 2025-03-03
Payer: MEDICARE

## 2025-03-03 VITALS
HEIGHT: 61 IN | RESPIRATION RATE: 15 BRPM | SYSTOLIC BLOOD PRESSURE: 131 MMHG | BODY MASS INDEX: 30.29 KG/M2 | WEIGHT: 160.4 LBS | HEART RATE: 75 BPM | TEMPERATURE: 98.1 F | DIASTOLIC BLOOD PRESSURE: 75 MMHG | OXYGEN SATURATION: 100 %

## 2025-03-03 DIAGNOSIS — R11.2 NAUSEA AND VOMITING, UNSPECIFIED VOMITING TYPE: Primary | ICD-10-CM

## 2025-03-03 DIAGNOSIS — E11.29 TYPE 2 DIABETES MELLITUS WITH OTHER DIABETIC KIDNEY COMPLICATION, WITHOUT LONG-TERM CURRENT USE OF INSULIN (HCC): ICD-10-CM

## 2025-03-03 DIAGNOSIS — I10 ESSENTIAL (PRIMARY) HYPERTENSION: ICD-10-CM

## 2025-03-03 DIAGNOSIS — E83.52 HYPERCALCEMIA: ICD-10-CM

## 2025-03-03 PROBLEM — E11.9 DIABETES MELLITUS (HCC): Status: ACTIVE | Noted: 2025-03-03

## 2025-03-03 LAB
ALBUMIN SERPL ELPH-MCNC: 3.7 G/DL (ref 2.9–4.4)
ALBUMIN/GLOB SERPL: 1 (ref 0.7–1.7)
ALPHA1 GLOB SERPL ELPH-MCNC: 0.5 G/DL (ref 0–0.4)
ALPHA2 GLOB SERPL ELPH-MCNC: 1.2 G/DL (ref 0.4–1)
B-GLOBULIN SERPL ELPH-MCNC: 1.3 G/DL (ref 0.7–1.3)
GAMMA GLOB SERPL ELPH-MCNC: 0.7 G/DL (ref 0.4–1.8)
GLOBULIN SER CALC-MCNC: 3.6 G/DL (ref 2.2–3.9)
M PROTEIN SERPL ELPH-MCNC: ABNORMAL G/DL
PROT SERPL-MCNC: 7.3 G/DL (ref 6–8.5)

## 2025-03-03 PROCEDURE — 1159F MED LIST DOCD IN RCRD: CPT | Performed by: INTERNAL MEDICINE

## 2025-03-03 PROCEDURE — 99214 OFFICE O/P EST MOD 30 MIN: CPT | Performed by: INTERNAL MEDICINE

## 2025-03-03 PROCEDURE — 3017F COLORECTAL CA SCREEN DOC REV: CPT | Performed by: INTERNAL MEDICINE

## 2025-03-03 PROCEDURE — 2022F DILAT RTA XM EVC RTNOPTHY: CPT | Performed by: INTERNAL MEDICINE

## 2025-03-03 PROCEDURE — G8399 PT W/DXA RESULTS DOCUMENT: HCPCS | Performed by: INTERNAL MEDICINE

## 2025-03-03 PROCEDURE — 3075F SYST BP GE 130 - 139MM HG: CPT | Performed by: INTERNAL MEDICINE

## 2025-03-03 PROCEDURE — 3078F DIAST BP <80 MM HG: CPT | Performed by: INTERNAL MEDICINE

## 2025-03-03 PROCEDURE — 1125F AMNT PAIN NOTED PAIN PRSNT: CPT | Performed by: INTERNAL MEDICINE

## 2025-03-03 PROCEDURE — 1090F PRES/ABSN URINE INCON ASSESS: CPT | Performed by: INTERNAL MEDICINE

## 2025-03-03 PROCEDURE — G8427 DOCREV CUR MEDS BY ELIG CLIN: HCPCS | Performed by: INTERNAL MEDICINE

## 2025-03-03 PROCEDURE — G8417 CALC BMI ABV UP PARAM F/U: HCPCS | Performed by: INTERNAL MEDICINE

## 2025-03-03 PROCEDURE — 3046F HEMOGLOBIN A1C LEVEL >9.0%: CPT | Performed by: INTERNAL MEDICINE

## 2025-03-03 PROCEDURE — 1123F ACP DISCUSS/DSCN MKR DOCD: CPT | Performed by: INTERNAL MEDICINE

## 2025-03-03 PROCEDURE — 1036F TOBACCO NON-USER: CPT | Performed by: INTERNAL MEDICINE

## 2025-03-03 RX ORDER — CYCLOBENZAPRINE HCL 5 MG
5 TABLET ORAL 3 TIMES DAILY PRN
COMMUNITY

## 2025-03-03 RX ORDER — ONDANSETRON 4 MG/1
4 TABLET, FILM COATED ORAL EVERY 12 HOURS PRN
COMMUNITY
Start: 2025-02-19 | End: 2025-03-12

## 2025-03-03 RX ORDER — FAMOTIDINE 20 MG/1
20 TABLET, FILM COATED ORAL 2 TIMES DAILY
Qty: 60 TABLET | Refills: 3 | Status: SHIPPED | OUTPATIENT
Start: 2025-03-03

## 2025-03-03 RX ORDER — GABAPENTIN 300 MG/1
300 CAPSULE ORAL 3 TIMES DAILY
COMMUNITY
Start: 2025-02-12

## 2025-03-03 ASSESSMENT — PATIENT HEALTH QUESTIONNAIRE - PHQ9: DEPRESSION UNABLE TO ASSESS: FUNCTIONAL CAPACITY MOTIVATION LIMITS ACCURACY

## 2025-03-03 NOTE — PROGRESS NOTES
worsen/change/persist.  Discussed expected course/resolution/complications of diagnosis in detail with patient.    Medication risks/benefits/costs/interactions/alternatives discussed with patient.  She was given an after visit summary which includes diagnoses, current medications, & vitals.  She expressed understanding with the diagnosis and plan.

## 2025-03-04 RX ORDER — ROPINIROLE 1 MG/1
TABLET, FILM COATED ORAL
Qty: 60 TABLET | Refills: 0 | Status: SHIPPED | OUTPATIENT
Start: 2025-03-04

## 2025-03-12 RX ORDER — LEVOTHYROXINE SODIUM 125 UG/1
125 TABLET ORAL DAILY
Qty: 90 TABLET | Refills: 0 | Status: SHIPPED | OUTPATIENT
Start: 2025-03-12

## 2025-03-12 NOTE — TELEPHONE ENCOUNTER
Chief Complaint   Patient presents with    Medication Refill     Last Appointment with Vivek Gilbert MD:  3/3/2025   Future Appointments   Date Time Provider Department Center   11/17/2025  9:00 AM [facility] 1 [facility]

## 2025-03-17 ENCOUNTER — RESULTS FOLLOW-UP (OUTPATIENT)
Age: 75
End: 2025-03-17

## 2025-03-17 DIAGNOSIS — I10 ESSENTIAL (PRIMARY) HYPERTENSION: ICD-10-CM

## 2025-03-17 DIAGNOSIS — R11.2 NAUSEA AND VOMITING, UNSPECIFIED VOMITING TYPE: ICD-10-CM

## 2025-03-17 DIAGNOSIS — E11.29 TYPE 2 DIABETES MELLITUS WITH OTHER DIABETIC KIDNEY COMPLICATION, WITHOUT LONG-TERM CURRENT USE OF INSULIN: ICD-10-CM

## 2025-03-17 LAB
ALBUMIN SERPL-MCNC: 3.6 G/DL (ref 3.5–5)
ALBUMIN/GLOB SERPL: 1.1 (ref 1.1–2.2)
ALP SERPL-CCNC: 55 U/L (ref 45–117)
ALT SERPL-CCNC: 15 U/L (ref 12–78)
ANION GAP SERPL CALC-SCNC: 7 MMOL/L (ref 2–12)
AST SERPL-CCNC: 17 U/L (ref 15–37)
BILIRUB SERPL-MCNC: 0.3 MG/DL (ref 0.2–1)
BUN SERPL-MCNC: 21 MG/DL (ref 6–20)
BUN/CREAT SERPL: 30 (ref 12–20)
CALCIUM SERPL-MCNC: 9.7 MG/DL (ref 8.5–10.1)
CHLORIDE SERPL-SCNC: 105 MMOL/L (ref 97–108)
CO2 SERPL-SCNC: 27 MMOL/L (ref 21–32)
CREAT SERPL-MCNC: 0.71 MG/DL (ref 0.55–1.02)
GLOBULIN SER CALC-MCNC: 3.4 G/DL (ref 2–4)
GLUCOSE SERPL-MCNC: 106 MG/DL (ref 65–100)
POTASSIUM SERPL-SCNC: 3.7 MMOL/L (ref 3.5–5.1)
PROT SERPL-MCNC: 7 G/DL (ref 6.4–8.2)
SODIUM SERPL-SCNC: 139 MMOL/L (ref 136–145)

## 2025-03-25 NOTE — TELEPHONE ENCOUNTER
New Patient Office Visit      Patient Name: Krystina Banks  : 1956   MRN: [id number]     Referring Provider: Savannah Griffith MD    Chief Complaint:      ICD-10-CM ICD-9-CM   1. Prolonged grief disorder  F43.81 309.1   2. Generalized anxiety disorder  F41.1 300.02   3. Insomnia, unspecified type  G47.00 780.52        History of Present Illness:   Krystina Banks is a 68 y.o. female who is here today for initial evaluation and to establish care for medication and symptom management.  Patient has a previous diagnosis of prolonged grieving and anxiety.  Patient lost her son who was 39 in  due to a chronic illness.  Patient discussed this a lot on today's visit and about the care he had received when he was sick and everything that had happened leading up to his passing.  Patient was traumatized by everything that she witnessed with her child and has been grieving ever since.  Patient has not received a closure that she needs as there is ongoing drama as well in the family concerning this.  Patient has been in therapy off and on and has also been medicated with Prozac 10 mg daily since  that was started by her primary care provider.  She states she feels Prozac has made her numb.  She feels emotional at times but nothing comes out.  She states she went to a [place] basketball game over the weekend and had to pretend to enjoy it as she felt no enjoyment.  She states she has had no interest in anything.  She states sick to her stomach.  She is very emotional on today's visit when discussing everything.  She was receiving counseling from a girl named Terra within our clinic but then when Terra left she stopped coming to our clinic.  [facility] but is interested in transferring back to our clinic for therapy as [facility] does not take her insurance.  Prior to her son passing away, she had anxiety but denied having any depression.  She  ----- Message from Nica Chinchilla NP sent at 6/30/2017 10:43 AM EDT -----  Can you call her and see if she has gotten in with Dr. Yohana De [facility] orthoped[facility] yet. And if not when and if so what did he say? Thanks       Also see if her symptoms have gotten any better since the last visit?  Thanks states she has always been somewhat of a nervous person.  She states she gets scared a lot.  Even when she goes to bed she wants her  in bed with her.  She states she is not sleeping well and is very restless at night.  She states she cannot get her mind to stop thinking about her son and everything that is going on.  She does have another daughter who is 37 years old who she is trying to live for.  She states if it was not for her daughter she would have give up.  Patient does not feel that her Prozac has been helping her wants to come off this medication.  She inquired about a medication that would just help make her feel better.  We discussed how losing a child is one of the most difficult things anyone could ever go through and that it is okay for her to grieve.  We discussed how medication may make it a little more tolerable but that she has to feel and process those emotions and grieving to be able to overcome this.  We discussed the importance of patient staying in therapy if she needs intensive therapy for her grieving and we discussed her staying active and doing things even when she does not feel like doing it.  Patient does go to the [facility] twice a week and does water aerobics.  She enjoys fellowship as well as hanging out with her friends.  She tries to make herself do things as she has always been an active person.  Patient has decided for now that she wants to come off the Prozac as she hates taking medication.  She wants to wean off of this medication safely and then follow back up with this provider to further discuss medication and symptom management.  Patient is aware that she needs to continue in psychotherapy and will continue to do this.  She knows this is an important part of her treatment plan and for her to progress in her processing her grief.  Patient also refuses any medication at this time for her insomnia as she gets up early and she does not like the way it makes her feel the next  morning.  We did talk about over-the-counter medications that could assist her with sleep.  She is interested in trying these such as ZzzQuil and Unisom.  She has tried melatonin in the past that did not work.  After we discussed medication and symptom management, patient will be weaned off of her Prozac.  Patient will take Prozac 10 mg  every other day x 14 days and then will discontinue this medication.  She was instructed if she has any complications coming off this medication to notify this provider immediately.  She was instructed with any new or worsening symptoms to notify this provider.  She was instructed with any thoughts of self-harm or suicidal ideations with an intent or plan to go directly to the emergency room.  Patient was instructed on adequate nutrition and hydration as well as adapting to a good exercise regimen to help improve her overall mental and physical wellbeing.  She verbalized understanding to all instruction.  She denies any recent or current SI/HI/AVH.  She will follow up with this provider in 4 weeks or sooner if needed for medication and symptom management.    Therapy: She is currently in therapy at [facility] but will possibly transfer to this clinic for therapy    Subjective      Review of Systems:   Review of Systems   Constitutional:  Positive for fatigue.   Psychiatric/Behavioral:  Positive for dysphoric mood, sleep disturbance and stress. The patient is nervous/anxious.        Past Medical History:   Past Medical History:   Diagnosis Date    Arthritis     Back pain     Eczema     nummular - dr wilde aware     Headache     PONV (postoperative nausea and vomiting)     just had nausea with first knee surgery - patch helped     Presence of tooth-root and mandibular implants     upper right side     Right leg pain     Wears glasses     readers       Past Surgical History:   Past Surgical History:   Procedure Laterality Date    BACK SURGERY      lumbar 2012    BREAST  BIOPSY      COLONOSCOPY      x2     ENDOSCOPY      HYSTERECTOMY      KNEE ARTHROSCOPY Right     LUMBAR DISCECTOMY N/A 07/06/2017    Procedure: REVISION L5-S1 LUMBAR DISCECTOMY ;  Surgeon: Meliton Hunt MD;  Location:  [facility];  Service:     NECK SURGERY      2011    OOPHORECTOMY      REPLACEMENT TOTAL KNEE Right     TOTAL KNEE ARTHROPLASTY Right 10/29/2020    Procedure: TOTAL KNEE ARTHROPLASTY RIGHT;  Surgeon: Manuel Valadez MD;  Location:  [facility];  Service: Orthopedics;  Laterality: Right;    TUBAL ABDOMINAL LIGATION      WISDOM TOOTH EXTRACTION         Family History:   Family History   Problem Relation Age of Onset    Breast cancer Neg Hx        Family Psychiatric History:  None    Screening Scores:   PHQ-9 Total Score: 12  JUSTIN-7  Feeling nervous, anxious or on edge: More than half the days  Not being able to stop or control worrying: Nearly every day  Worrying too much about different things: More than half the days  Trouble Relaxing: More than half the days  Being so restless that it is hard to sit still: Several days  Feeling afraid as if something awful might happen: Nearly every day  Becoming easily annoyed or irritable: Several days  JUSTIN 7 Total Score: 14  If you checked any problems, how difficult have these problems made it for you to do your work, take care of things at home, or get along with other people: Somewhat difficult    Psychiatric History:   Previous medications tried: [facility]  Inpatient admissions: None  History of suicide/self-harm attempts: None  Family history of suicide or attempts: None  Trauma/Abuse History: She has trauma from going through her son's chronic illness that led him to pass away  Developmental History: She grew up in [place], [place] with both parents.  She had 3 brothers and 2 sisters.  She graduated high school in [place].  She got  and had 2 children and worked small jobs.  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  She then  "worked at [employer] as an  for the 13 years.  Her  worked for the [employer].    RISK ASSESSMENT:  Does patient currently have intent, plan, ideation for suicide?  Denies  Access to firearms or weapons: Denies  History of homicidal ideation: Denies  Risk Taking/Impulsive Behavior (current or past): Denies describe: None  Protective factors: Family    Social History:  Highest level of education obtained: High school  Living situation: Lives with her   Patient's Occupation: Retired  Leisure and Recreation: Water aerobics, fellowship, hang out with friends  Support system: , daughter  Illicit substance use: Denies  Alcohol use: Denies  Tobacco use: Denies    Legal History:   None    Medications:     Current Outpatient Medications:     Azelastine HCl 137 MCG/SPRAY solution, , Disp: , Rfl:     calcium carb-cholecalciferol 600-800 MG-UNIT tablet, Take 1 tablet by mouth Daily., Disp: , Rfl:     estradiol (ESTRACE) 1 MG tablet, Take 1 tablet by mouth Daily., Disp: 90 tablet, Rfl: 3    ipratropium (ATROVENT) 0.06 % nasal spray, , Disp: , Rfl:     naproxen (NAPROSYN) 375 MG tablet, As Needed., Disp: , Rfl:     pantoprazole (PROTONIX) 40 MG EC tablet, Take 1 tablet by mouth Daily., Disp: , Rfl:     FLUoxetine (PROzac) 10 MG capsule, Take 1 capsule by mouth Every Other Day for 14 days. Take 1 capsule by mouth every other day x 14 days and then discontinue, Disp: 7 capsule, Rfl: 0    Medication Considerations:  EWA reviewed and appropriate.      Allergies:   Allergies   Allergen Reactions    Sulfa Antibiotics Unknown - Low Severity    Adhesive Tape Rash     Patient thinks its surgical (clear) tape that breaks patient out in small bumps and itchy skin,    Patient can have paper tape        Objective     Physical Exam:  Vital Signs:   Vitals:    03/25/25 1216   BP: 142/74   Pulse: 74   SpO2: 98%   Weight: 65.6 kg (144 lb 9.6 oz)   Height: 165.1 cm (65\")     Body mass index is 24.06 " kg/m².     Mental Status Exam:   MENTAL STATUS EXAM   General Appearance:  Cleanly groomed and dressed  Eye Contact:  Poor eye contact  Attitude:  Cooperative  Motor Activity:  Normal gait, posture  Muscle Strength:  Normal  Speech:  Normal rate, tone, volume  Language:  Spontaneous  Mood and affect:  Other  Other Comment:  Emotional  Hopelessness:  Denies  Loneliness: Denies  Thought Process:  Logical  Associations/ Thought Content:  No delusions  Hallucinations:  None  Suicidal Ideations:  Not present  Homicidal Ideation:  Not present  Sensorium:  Alert  Orientation:  Person, place, time and situation  Immediate Recall, Recent, and Remote Memory:  Intact  Attention Span/ Concentration:  Good  Fund of Knowledge:  Appropriate for age and educational level  Intellectual Functioning:  Average range  Insight:  Good  Judgement:  Good  Reliability:  Good  Impulse Control:  Good       Assessment / Plan      Visit Diagnosis/Orders Placed This Visit:  Diagnoses and all orders for this visit:    1. Prolonged grief disorder (Primary)  -     FLUoxetine (PROzac) 10 MG capsule; Take 1 capsule by mouth Every Other Day for 14 days. Take 1 capsule by mouth every other day x 14 days and then discontinue  Dispense: 7 capsule; Refill: 0    2. Generalized anxiety disorder  -     FLUoxetine (PROzac) 10 MG capsule; Take 1 capsule by mouth Every Other Day for 14 days. Take 1 capsule by mouth every other day x 14 days and then discontinue  Dispense: 7 capsule; Refill: 0    3. Insomnia, unspecified type         Functional Status: Moderate impairment    Prognosis: Fair with ongoing treatment    Impression/Formulation:  Patient appeared alert and oriented.  Patient is voluntarily requesting to begin psychiatric medication management at [facility].  Patient is receptive to assistance with maintaining a stable lifestyle.  Patient presents with history of     ICD-10-CM ICD-9-CM   1. Prolonged grief disorder  F43.81 309.1    2. Generalized anxiety disorder  F41.1 300.02   3. Insomnia, unspecified type  G47.00 780.52   .     Treatment Plan:   -Discontinue Prozac 10 mg by taking this medication every other day x 14 days and then discontinue, prescription sent  -Continue in psychotherapy  -Follow-up in 4 weeks      The EWA report, reviewed through PDMP, of the past 12 months were reviewed and is appropriate.  The patient/guardian reports taking the medication only as prescribed.  The patient/guardian denies any abuse or misuse of the medication.  The patient/guardian denies any other substance use or issues.  There are no apparent substance related issues.  The patient reports no side effects of the current medication usage.  The patient/guardian has reported significant improvement with medication usage and wishes to continue medication as prescribed.  The patient/guardian is appropriate to continue with current medication usage at this time.  Reinforced risks and side effects of medication usage, patient and/or guardian verbalize understanding in their own words and are in agreement with current plan.    Discussed medication options and treatment plan of prescribed medication, any off label use of medication, as well as the risks, benefits, any black box warnings including increased suicidality, and side effects including but not limited to potential falls, dizziness, possible impaired driving, GI side effects (change in appetite, abdominal discomfort, nausea, vomiting, diarrhea, and/or constipation), dry mouth, somnolence, sedation, insomnia, activation, agitation, irritation, tremors, abnormal muscle movements or disorders, headache, sweating, possible bruising or rare bleeding, electrolyte and/or fluid abnormalities, change in blood pressure/heart rate/and or heart rhythm, sexual dysfunction, and metabolic adversities among others. Patient and/or guardian agreeable to call the office with any worsening of symptoms or onset of  side effects, or if any concerns or questions arise.  The contact information for the office is made available to the patient and/or guardian.  Patient and/or guardian agreeable to call 911 or go to the nearest ER should they begin having any SI/HI, or if any urgent concerns arise. No medication side effects or related complaints today.    GOALS:  Short Term Goals: Patient will be compliant with medication, and patient will have no significant medication related side effects.  Patient will be engaged in psychotherapy as indicated.  Patient will report subjective improvement of symptoms.  Long term goals: To stabilize mood and treat/improve subjective symptoms, the patient will stay out of the hospital, the patient will be at an optimal level of functioning, and the patient will take all medications as prescribed.  The patient/guardian verbalized understanding and agreement with goals that were mutually set.     Patient will continue supportive psychotherapy efforts and medications as indicated. Clinic will obtain release of information for current treatment team for continuity of care as needed. Patient will contact this office, call 911 or present to the nearest emergency room should suicidal or homicidal ideations occur. Discussed medication options and treatment plan of prescribed medication(s) as well as the risks, benefits, and potential side effects. Patient acknowledged and verbally consented to continue with current treatment plan and was educated on the importance of compliance with treatment and follow-up appointments.     Follow Up:   Return in about 4 weeks (around 4/22/2025) for Med Check.        SOTERO Sr  [facility]     This is electronically signed by SOTERO Sr  03 /25/2025 14:04 EDT    Part of this note may be an electronic transcription/translation of spoken language to printed text using the Dragon Dictation System.

## 2025-04-08 ENCOUNTER — TELEPHONE (OUTPATIENT)
Age: 75
End: 2025-04-08

## 2025-04-08 NOTE — TELEPHONE ENCOUNTER
Patient's last dose of Zepbound was 1/15/25, due to having back surgery.  Her surgeon said that it was ok to start using it again.  Pt would like to know what dose should she start off with.    Kacey Soriano - [phone]

## 2025-04-08 NOTE — TELEPHONE ENCOUNTER
Spoke w/ pt last dosage of zepbound was 1/15/25.  Surgeon gave OK to resume.  Pt checked vial on hand and it is .  Requesting [facility] rx to [facility].

## 2025-04-09 NOTE — TELEPHONE ENCOUNTER
Form filled out again - signed by Vivek Gilbert MD.  Faxed to 3 w/ confirmation received.  Copy sent to scan to media tab in patients chart.

## 2025-04-24 RX ORDER — ROPINIROLE 1 MG/1
1 TABLET, FILM COATED ORAL 2 TIMES DAILY
Qty: 60 TABLET | Refills: 0 | Status: SHIPPED | OUTPATIENT
Start: 2025-04-24

## 2025-05-04 RX ORDER — CHLORTHALIDONE 50 MG/1
50 TABLET ORAL DAILY
Qty: 90 TABLET | Refills: 0 | Status: SHIPPED | OUTPATIENT
Start: 2025-05-04

## 2025-05-21 RX ORDER — ROPINIROLE 1 MG/1
1 TABLET, FILM COATED ORAL 2 TIMES DAILY
Qty: 60 TABLET | Refills: 0 | Status: SHIPPED | OUTPATIENT
Start: 2025-05-21

## 2025-06-02 RX ORDER — CONJUGATED ESTROGENS 0.62 MG/1
0.62 TABLET, FILM COATED ORAL DAILY
Qty: 90 TABLET | Refills: 0 | Status: SHIPPED | OUTPATIENT
Start: 2025-06-02

## 2025-06-09 RX ORDER — LEVOTHYROXINE SODIUM 125 UG/1
125 TABLET ORAL DAILY
Qty: 90 TABLET | Refills: 0 | Status: SHIPPED | OUTPATIENT
Start: 2025-06-09

## 2025-06-09 NOTE — TELEPHONE ENCOUNTER
Chief Complaint   Patient presents with    Medication Refill     Last Appointment with Vivek Gilbert MD:  3/3/2025   Future Appointments   Date Time Provider Department Center   12/2/2025  9:00 AM [facility]

## 2025-06-13 RX ORDER — FAMOTIDINE 20 MG/1
20 TABLET, FILM COATED ORAL 2 TIMES DAILY
Qty: 60 TABLET | Refills: 0 | Status: SHIPPED | OUTPATIENT
Start: 2025-06-13

## 2025-06-16 RX ORDER — ROPINIROLE 1 MG/1
1 TABLET, FILM COATED ORAL 2 TIMES DAILY
Qty: 60 TABLET | Refills: 0 | Status: SHIPPED | OUTPATIENT
Start: 2025-06-16

## 2025-06-27 NOTE — TELEPHONE ENCOUNTER
Pt last seen on 11/26/24 - medicare annual.  Due to return in one year.    Has no appt scheduled at this time. Will forward to front office pool to call pt.    Will forward to provider for refill.

## 2025-06-27 NOTE — TELEPHONE ENCOUNTER
Medication Refill Request    Kacey PARRIS Soriano is requesting a refill of the following medication(s):     Tirzepatide/levocarnitine 12.5mg/15mg/ml injectable solution    Please send refill to:     [facility] [address] [address] -  [phone] - F [phone] [address] [place] [id number]  Phone: [phone] Fax: [phone]

## 2025-07-08 ENCOUNTER — TELEPHONE (OUTPATIENT)
Age: 75
End: 2025-07-08

## 2025-07-08 NOTE — TELEPHONE ENCOUNTER
Spoke with pt to discuss if she is ready to increase dose at this time.  Pt is tolerating well an would like next dosage.    [facility] form printed for Vivek Gilbert MD to review/sign.    Pt request MCM once script has been sent.

## 2025-07-08 NOTE — TELEPHONE ENCOUNTER
Medication Refill Request    Kacey PARRIS Soriano is requesting a refill of the following medication(s):   tirzepatide-weight management (ZEPBOUND) 5 MG/0.5ML SOLN subCUTAneous injection (VIAL)   Please send refill to:     [facility] [place] - [address] [address] -  [phone] - F [phone]  [address] [place] [id number]  Phone: [phone] Fax: [phone]    Per pt pharmacy do not have the refill.

## 2025-07-11 RX ORDER — ROPINIROLE 1 MG/1
1 TABLET, FILM COATED ORAL 2 TIMES DAILY
Qty: 60 TABLET | Refills: 0 | Status: SHIPPED | OUTPATIENT
Start: 2025-07-11

## 2025-07-11 RX ORDER — FAMOTIDINE 20 MG/1
20 TABLET, FILM COATED ORAL 2 TIMES DAILY
Qty: 60 TABLET | Refills: 0 | Status: SHIPPED | OUTPATIENT
Start: 2025-07-11

## 2025-07-15 RX ORDER — ROPINIROLE 1 MG/1
1 TABLET, FILM COATED ORAL 2 TIMES DAILY
Qty: 60 TABLET | Refills: 0 | OUTPATIENT
Start: 2025-07-15

## 2025-07-21 ENCOUNTER — TELEPHONE (OUTPATIENT)
Age: 75
End: 2025-07-21

## 2025-08-05 RX ORDER — FENOFIBRATE 134 MG/1
CAPSULE ORAL
Qty: 90 CAPSULE | Refills: 0 | Status: SHIPPED | OUTPATIENT
Start: 2025-08-05

## 2025-08-05 RX ORDER — CHLORTHALIDONE 50 MG/1
50 TABLET ORAL DAILY
Qty: 90 TABLET | Refills: 0 | Status: SHIPPED | OUTPATIENT
Start: 2025-08-05

## 2025-08-06 RX ORDER — FENOFIBRATE 134 MG/1
CAPSULE ORAL
Qty: 90 CAPSULE | Refills: 0 | OUTPATIENT
Start: 2025-08-06

## 2025-08-10 RX ORDER — FAMOTIDINE 20 MG/1
20 TABLET, FILM COATED ORAL 2 TIMES DAILY
Qty: 60 TABLET | Refills: 0 | Status: SHIPPED | OUTPATIENT
Start: 2025-08-10

## 2025-08-10 RX ORDER — ROPINIROLE 1 MG/1
1 TABLET, FILM COATED ORAL 2 TIMES DAILY
Qty: 60 TABLET | Refills: 0 | Status: SHIPPED | OUTPATIENT
Start: 2025-08-10

## 2025-08-12 RX ORDER — ROPINIROLE 1 MG/1
1 TABLET, FILM COATED ORAL 2 TIMES DAILY
Qty: 60 TABLET | Refills: 0 | Status: SHIPPED | OUTPATIENT
Start: 2025-08-12

## 2025-08-17 RX ORDER — FENOFIBRATE 134 MG/1
CAPSULE ORAL
Qty: 90 CAPSULE | Refills: 0 | Status: SHIPPED | OUTPATIENT
Start: 2025-08-17

## 2025-08-27 ENCOUNTER — OFFICE VISIT (OUTPATIENT)
Age: 75
End: 2025-08-27
Payer: MEDICARE

## 2025-08-27 VITALS
RESPIRATION RATE: 16 BRPM | OXYGEN SATURATION: 96 % | HEIGHT: 61 IN | SYSTOLIC BLOOD PRESSURE: 132 MMHG | TEMPERATURE: 97.9 F | BODY MASS INDEX: 30.93 KG/M2 | WEIGHT: 163.8 LBS | HEART RATE: 80 BPM | DIASTOLIC BLOOD PRESSURE: 84 MMHG

## 2025-08-27 DIAGNOSIS — R32 URINARY INCONTINENCE, UNSPECIFIED TYPE: Primary | ICD-10-CM

## 2025-08-27 DIAGNOSIS — R32 URINARY INCONTINENCE, UNSPECIFIED TYPE: ICD-10-CM

## 2025-08-27 DIAGNOSIS — B37.31 YEAST VAGINITIS: ICD-10-CM

## 2025-08-27 LAB
APPEARANCE UR: CLEAR
BACTERIA URNS QL MICRO: ABNORMAL /HPF
BILIRUB UR QL: NEGATIVE
COLOR UR: ABNORMAL
EPITH CASTS URNS QL MICRO: ABNORMAL /LPF
GLUCOSE UR STRIP.AUTO-MCNC: NEGATIVE MG/DL
HGB UR QL STRIP: NEGATIVE
HYALINE CASTS URNS QL MICRO: ABNORMAL /LPF (ref 0–5)
KETONES UR QL STRIP.AUTO: ABNORMAL MG/DL
LEUKOCYTE ESTERASE UR QL STRIP.AUTO: ABNORMAL
NITRITE UR QL STRIP.AUTO: NEGATIVE
PH UR STRIP: 5 (ref 5–8)
PROT UR STRIP-MCNC: ABNORMAL MG/DL
RBC #/AREA URNS HPF: ABNORMAL /HPF (ref 0–5)
SP GR UR REFRACTOMETRY: 1.03 (ref 1–1.03)
UROBILINOGEN UR QL STRIP.AUTO: 0.2 EU/DL (ref 0.2–1)
WBC URNS QL MICRO: ABNORMAL /HPF (ref 0–4)

## 2025-08-27 PROCEDURE — 3017F COLORECTAL CA SCREEN DOC REV: CPT | Performed by: INTERNAL MEDICINE

## 2025-08-27 PROCEDURE — 1159F MED LIST DOCD IN RCRD: CPT | Performed by: INTERNAL MEDICINE

## 2025-08-27 PROCEDURE — 3075F SYST BP GE 130 - 139MM HG: CPT | Performed by: INTERNAL MEDICINE

## 2025-08-27 PROCEDURE — G8399 PT W/DXA RESULTS DOCUMENT: HCPCS | Performed by: INTERNAL MEDICINE

## 2025-08-27 PROCEDURE — 0509F URINE INCON PLAN DOCD: CPT | Performed by: INTERNAL MEDICINE

## 2025-08-27 PROCEDURE — G8427 DOCREV CUR MEDS BY ELIG CLIN: HCPCS | Performed by: INTERNAL MEDICINE

## 2025-08-27 PROCEDURE — 1123F ACP DISCUSS/DSCN MKR DOCD: CPT | Performed by: INTERNAL MEDICINE

## 2025-08-27 PROCEDURE — 99213 OFFICE O/P EST LOW 20 MIN: CPT | Performed by: INTERNAL MEDICINE

## 2025-08-27 PROCEDURE — 1126F AMNT PAIN NOTED NONE PRSNT: CPT | Performed by: INTERNAL MEDICINE

## 2025-08-27 PROCEDURE — 1036F TOBACCO NON-USER: CPT | Performed by: INTERNAL MEDICINE

## 2025-08-27 PROCEDURE — 1090F PRES/ABSN URINE INCON ASSESS: CPT | Performed by: INTERNAL MEDICINE

## 2025-08-27 PROCEDURE — 3079F DIAST BP 80-89 MM HG: CPT | Performed by: INTERNAL MEDICINE

## 2025-08-27 PROCEDURE — G8417 CALC BMI ABV UP PARAM F/U: HCPCS | Performed by: INTERNAL MEDICINE

## 2025-08-27 RX ORDER — FLUCONAZOLE 150 MG/1
150 TABLET ORAL
Qty: 2 TABLET | Refills: 0 | Status: SHIPPED | OUTPATIENT
Start: 2025-08-27 | End: 2025-09-02

## 2025-08-27 ASSESSMENT — PATIENT HEALTH QUESTIONNAIRE - PHQ9
SUM OF ALL RESPONSES TO PHQ QUESTIONS 1-9: 0
SUM OF ALL RESPONSES TO PHQ QUESTIONS 1-9: 0
2. FEELING DOWN, DEPRESSED OR HOPELESS: NOT AT ALL
SUM OF ALL RESPONSES TO PHQ QUESTIONS 1-9: 0
SUM OF ALL RESPONSES TO PHQ QUESTIONS 1-9: 0
1. LITTLE INTEREST OR PLEASURE IN DOING THINGS: NOT AT ALL

## 2025-08-28 LAB
BACTERIA SPEC CULT: NORMAL
CC UR VC: NORMAL
SERVICE CMNT-IMP: NORMAL

## 2025-09-01 RX ORDER — CONJUGATED ESTROGENS 0.62 MG/1
0.62 TABLET, FILM COATED ORAL DAILY
Qty: 90 TABLET | Refills: 0 | Status: SHIPPED | OUTPATIENT
Start: 2025-09-01

## 2025-09-01 RX ORDER — CLONIDINE HYDROCHLORIDE 0.2 MG/1
0.2 TABLET ORAL 2 TIMES DAILY
Qty: 180 TABLET | Refills: 0 | Status: SHIPPED | OUTPATIENT
Start: 2025-09-01

## 2025-09-01 RX ORDER — LEVOTHYROXINE SODIUM 125 UG/1
125 TABLET ORAL DAILY
Qty: 90 TABLET | Refills: 0 | Status: SHIPPED | OUTPATIENT
Start: 2025-09-01

## (undated) DEVICE — SYR 10ML LUER LOK 1/5ML GRAD --

## (undated) DEVICE — SET EXTN TBNG L BOR 4 W STPCOCK ST 32IN PRIMING VOL 6ML

## (undated) DEVICE — DEVON™ KNEE AND BODY STRAP 60" X 3" (1.5 M X 7.6 CM): Brand: DEVON

## (undated) DEVICE — PAD,ABDOMINAL,5"X9",ST,LF,25/BX: Brand: MEDLINE INDUSTRIES, INC.

## (undated) DEVICE — CATH IV AUTOGRD BC BLU 22GA 25 -- INSYTE

## (undated) DEVICE — PENCIL SMK EVAC 10 FT BLADE ELECTRD ROCKER FOR TELSCP

## (undated) DEVICE — BAG SPEC BIOHZD LF 2MIL 6X10IN -- CONVERT TO ITEM 357326

## (undated) DEVICE — CLIP APPLIER WITH CLIP LOGIC TECHNOLOGY: Brand: ENDO CLIP III

## (undated) DEVICE — INFECTION CONTROL KIT SYS

## (undated) DEVICE — KIT IV STRT W CHLORAPREP PD 1ML

## (undated) DEVICE — DRAPE,EXTREMITY,89X128,STERILE: Brand: MEDLINE

## (undated) DEVICE — PACK,LAPAROTOMY,2 REINFORCED GOWNS: Brand: MEDLINE

## (undated) DEVICE — (D)PREP SKN CHLRAPRP APPL 26ML -- CONVERT TO ITEM 371833

## (undated) DEVICE — LIGHT HANDLE: Brand: DEVON

## (undated) DEVICE — SYRINGE MED 20ML STD CLR PLAS LUERLOCK TIP N CTRL DISP

## (undated) DEVICE — SUTURE SZ 0 27IN 5/8 CIR UR-6  TAPER PT VIOLET ABSRB VICRYL J603H

## (undated) DEVICE — SPECIMEN RETRIEVAL POUCH: Brand: ENDO CATCH GOLD

## (undated) DEVICE — SUTURE VCRL SZ 3-0 L27IN ABSRB UD L26MM SH 1/2 CIR J416H

## (undated) DEVICE — AGENT HEMSTAT W2XL14IN OXIDIZED REGENERATED CELOS ABSRB FOR

## (undated) DEVICE — KENDALL RADIOLUCENT FOAM MONITORING ELECTRODE -RECTANGULAR SHAPE: Brand: KENDALL

## (undated) DEVICE — SUTURE MCRYL SZ 4-0 L27IN ABSRB UD L19MM PS-2 1/2 CIR PRIM Y426H

## (undated) DEVICE — SET IRRIG W 96IN TBNG 4 LN FLX BG

## (undated) DEVICE — BLADELESS OPTICAL TROCAR WITH FIXATION CANNULA: Brand: VERSAONE

## (undated) DEVICE — TROCAR SITE CLOSURE DEVICE: Brand: ENDO CLOSE

## (undated) DEVICE — CANN NASAL O2 CAPNOGRAPHY AD -- FILTERLINE

## (undated) DEVICE — TAPE,CLOTH/SILK,CURAD,3"X10YD,LF,40/CS: Brand: CURAD

## (undated) DEVICE — STERILE POLYISOPRENE POWDER-FREE SURGICAL GLOVES WITH EMOLLIENT COATING: Brand: PROTEXIS

## (undated) DEVICE — CONTAINER SPEC 20 ML LID NEUT BUFF FORMALIN 10 % POLYPR STS

## (undated) DEVICE — NEEDLE HYPO 25GA L1.5IN BVL ORIENTED ECLIPSE

## (undated) DEVICE — ENDO CARRY-ON PROCEDURE KIT INCLUDES ENZYMATIC SPONGE, GAUZE, BIOHAZARD LABEL, TRAY, LUBRICANT, DIRTY SCOPE LABEL, WATER LABEL, TRAY, DRAWSTRING PAD, AND DEFENDO 4-PIECE KIT.: Brand: ENDO CARRY-ON PROCEDURE KIT

## (undated) DEVICE — 3M™ TEGADERM™ TRANSPARENT FILM DRESSING FRAME STYLE, 1626W, 4 IN X 4-3/4 IN (10 CM X 12 CM), 50/CT 4CT/CASE: Brand: 3M™ TEGADERM™

## (undated) DEVICE — MAT SUCT W36XL48IN FLD CTRL DISP

## (undated) DEVICE — GOWN,PREVENTION PLUS,XLN/XL,ST,24/CS: Brand: MEDLINE

## (undated) DEVICE — TUBING INSUFLTN 10FT LUER -- CONVERT TO ITEM 368568

## (undated) DEVICE — NEEDLE HYPO 22GA L1.5IN BLK S STL HUB POLYPR SHLD REG BVL

## (undated) DEVICE — SOL IRR STRL H2O 1000ML BTL --

## (undated) DEVICE — NEEDLE HYPO 18GA L1.5IN PNK S STL HUB POLYPR SHLD REG BVL

## (undated) DEVICE — SUTURE ETHBND EXCEL SZ 0 L18IN NONABSORBABLE GRN L36MM CT-1 CX21D

## (undated) DEVICE — SUT ETHLN 2-0 18IN FS BLK --

## (undated) DEVICE — BLADELESS OPTICAL TROCAR WITH FIXATION CANNULA: Brand: VERSAPORT

## (undated) DEVICE — DRAPE,REIN 53X77,STERILE: Brand: MEDLINE

## (undated) DEVICE — SPONGE GZ W4XL4IN COT 12 PLY TYP VII WVN C FLD DSGN

## (undated) DEVICE — KENDALL SCD EXPRESS SLEEVES, KNEE LENGTH, MEDIUM: Brand: KENDALL SCD

## (undated) DEVICE — PREP SKN CHLRAPRP APL 26ML STR --

## (undated) DEVICE — 4.5 MM INCISOR STRAIGHT BLADES,                                    POWER/EP-1, LIME GREEN, PACKAGED 6                                    PER BOX, STERILE

## (undated) DEVICE — 1200 GUARD II KIT W/5MM TUBE W/O VAC TUBE: Brand: GUARDIAN

## (undated) DEVICE — STERILE POLYISOPRENE POWDER-FREE SURGICAL GLOVES: Brand: PROTEXIS

## (undated) DEVICE — SOLUTION IRRIG 3000ML LAC R FLX CONT

## (undated) DEVICE — REM POLYHESIVE ADULT PATIENT RETURN ELECTRODE: Brand: VALLEYLAB

## (undated) DEVICE — SHOULDER SUSPENSION KIT 6 PER BOX

## (undated) DEVICE — SET ADMIN 16ML TBNG L100IN 2 Y INJ SITE IV PIGGY BK DISP

## (undated) DEVICE — FORCEPS BX L240CM JAW DIA2.8MM L CAP W/ NDL MIC MESH TOOTH

## (undated) DEVICE — SUTURE PDS II SZ 2-0 L27IN ABSRB VLT L36MM CT-1 1/2 CIR Z339H

## (undated) DEVICE — BAG BELONG PT PERS CLEAR HANDL

## (undated) DEVICE — ARTHROSCOPY RICHMOND-LF: Brand: MEDLINE INDUSTRIES, INC.

## (undated) DEVICE — SUTURE VCRL SZ 2-0 L27IN ABSRB UD L26MM SH 1/2 CIR J417H

## (undated) DEVICE — CLEAR-TRAC THREADED CANNULA WITH                                    OBTURATOR 8 MM I.D. X 76 MM,                                    STERILE, LATEX FREE, BOX OF 10: Brand: CLEAR-TRAC

## (undated) DEVICE — ELECTRODE ES 36CM LAP FLAT L HK COAT DISP CLEANCOAT

## (undated) DEVICE — DRAPE,UTILTY,TAPE,15X26, 4EA/PK: Brand: MEDLINE

## (undated) DEVICE — DERMABOND SKIN ADH 0.7ML -- DERMABOND ADVANCED 12/BX

## (undated) DEVICE — IMMOBILIZER ORTH LIGHTWEIGHT LG UNIV 9X7 IN PREMIERPRO

## (undated) DEVICE — SUT ETHLN 3-0 18IN PS2 BLK --

## (undated) DEVICE — SOLIDIFIER FLUID 3000 CC ABSORB

## (undated) DEVICE — GARMENT,MEDLINE,DVT,INT,CALF,MED, GEN2: Brand: MEDLINE

## (undated) DEVICE — Z DISCONTINUED USE 2275686 GLOVE SURG SZ 8 L12IN FNGR THK13MIL WHT ISOLEX POLYISOPRENE

## (undated) DEVICE — CLICKLINE SCISSORS INSERT: Brand: CLICKLINE

## (undated) DEVICE — BW-412T DISP COMBO CLEANING BRUSH: Brand: SINGLE USE COMBINATION CLEANING BRUSH

## (undated) DEVICE — SOL IRR SOD CL 0.9% 1000ML BTL --

## (undated) DEVICE — SURGICAL PROCEDURE KIT GEN LAPAROSCOPY LF

## (undated) DEVICE — 3M™ MEDIPORE™ H SOFT CLOTH SURGICAL TAPE, 2863, 3 IN X 10 YD, 12/CASE: Brand: 3M™ MEDIPORE™

## (undated) DEVICE — 3M™ STERI-DRAPE™ U-DRAPE 1015: Brand: STERI-DRAPE™

## (undated) DEVICE — 4.5 MM HELICUT STRAIGHT BURRS,                                    POWER/EP-1, SLATE, 5000 MAXIMUM RPM,                                    PACKAGED 6 PER BOX, STERILE: Brand: DYONICS HELICUT

## (undated) DEVICE — 4-PORT MANIFOLD: Brand: NEPTUNE 2